# Patient Record
Sex: FEMALE | Race: WHITE | Employment: OTHER | URBAN - METROPOLITAN AREA
[De-identification: names, ages, dates, MRNs, and addresses within clinical notes are randomized per-mention and may not be internally consistent; named-entity substitution may affect disease eponyms.]

---

## 2017-01-05 ENCOUNTER — HOSPITAL ENCOUNTER (OUTPATIENT)
Dept: GENERAL RADIOLOGY | Facility: HOSPITAL | Age: 82
Discharge: HOME | End: 2017-01-05
Attending: ORTHOPAEDIC SURGERY
Payer: MEDICARE

## 2017-01-05 DIAGNOSIS — Z96.612 PRESENCE OF LEFT ARTIFICIAL SHOULDER JOINT: ICD-10-CM

## 2017-01-05 PROCEDURE — 73030 X-RAY EXAM OF SHOULDER: CPT

## 2017-02-28 ENCOUNTER — OFFICE VISIT (OUTPATIENT)
Dept: PODIATRY CLINIC | Facility: CLINIC | Age: 82
End: 2017-02-28

## 2017-02-28 DIAGNOSIS — Z79.4 TYPE 2 DIABETES MELLITUS WITH DIABETIC POLYNEUROPATHY, WITH LONG-TERM CURRENT USE OF INSULIN (HCC): Primary | ICD-10-CM

## 2017-02-28 DIAGNOSIS — L84 CALLUS: ICD-10-CM

## 2017-02-28 DIAGNOSIS — B35.1 ONYCHOMYCOSIS: ICD-10-CM

## 2017-02-28 DIAGNOSIS — E11.42 TYPE 2 DIABETES MELLITUS WITH DIABETIC POLYNEUROPATHY, WITH LONG-TERM CURRENT USE OF INSULIN (HCC): Primary | ICD-10-CM

## 2017-02-28 PROCEDURE — 11721 DEBRIDE NAIL 6 OR MORE: CPT | Performed by: PODIATRIST

## 2017-02-28 PROCEDURE — 11055 PARING/CUTG B9 HYPRKER LES 1: CPT | Performed by: PODIATRIST

## 2017-02-28 NOTE — PROGRESS NOTES
HPI:    Patient ID: Kamron More is a 80year old female. HPI  This 80-year-old diabetic presents for evaluation and care. She saw Coco Noel on February 27, 2017.   Patient reports that her most recent A1c was 8.6 and her fasting blood sugar today wa Comment:rash  Shellfish-Derived P*        Comment:Other reaction(s): (DO NOT USE, NOT SCREENED)             SHELLFISH CONT PROD   PHYSICAL EXAM:   Physical Exam  On physical exam the dorsalis pedis pulses noted but diminished in the posterior tibial pulse

## 2017-05-23 ENCOUNTER — APPOINTMENT (OUTPATIENT)
Dept: LAB | Facility: HOSPITAL | Age: 82
End: 2017-05-23
Attending: UROLOGY
Payer: MEDICARE

## 2017-05-23 DIAGNOSIS — N39.0 RECURRENT UTI: ICD-10-CM

## 2017-05-23 PROCEDURE — 81001 URINALYSIS AUTO W/SCOPE: CPT

## 2017-05-23 PROCEDURE — 87186 SC STD MICRODIL/AGAR DIL: CPT

## 2017-05-23 PROCEDURE — 87077 CULTURE AEROBIC IDENTIFY: CPT

## 2017-05-23 PROCEDURE — 87086 URINE CULTURE/COLONY COUNT: CPT

## 2017-05-26 ENCOUNTER — TELEPHONE (OUTPATIENT)
Dept: SURGERY | Facility: CLINIC | Age: 82
End: 2017-05-26

## 2017-05-26 RX ORDER — CEFADROXIL 500 MG/1
CAPSULE ORAL
Qty: 20 CAPSULE | Refills: 0 | Status: SHIPPED | OUTPATIENT
Start: 2017-05-26 | End: 2018-09-06

## 2017-05-30 ENCOUNTER — OFFICE VISIT (OUTPATIENT)
Dept: OTOLARYNGOLOGY | Facility: CLINIC | Age: 82
End: 2017-05-30

## 2017-05-30 VITALS
BODY MASS INDEX: 28.93 KG/M2 | HEIGHT: 66 IN | WEIGHT: 180 LBS | SYSTOLIC BLOOD PRESSURE: 130 MMHG | TEMPERATURE: 98 F | DIASTOLIC BLOOD PRESSURE: 80 MMHG

## 2017-05-30 DIAGNOSIS — H61.23 BILATERAL IMPACTED CERUMEN: Primary | ICD-10-CM

## 2017-05-30 PROCEDURE — G0463 HOSPITAL OUTPT CLINIC VISIT: HCPCS | Performed by: OTOLARYNGOLOGY

## 2017-05-30 PROCEDURE — 99202 OFFICE O/P NEW SF 15 MIN: CPT | Performed by: OTOLARYNGOLOGY

## 2017-05-30 RX ORDER — METFORMIN HYDROCHLORIDE 500 MG/1
500 TABLET, EXTENDED RELEASE ORAL 3 TIMES DAILY
COMMUNITY
Start: 2017-04-09

## 2017-06-28 ENCOUNTER — OFFICE VISIT (OUTPATIENT)
Dept: SURGERY | Facility: CLINIC | Age: 82
End: 2017-06-28

## 2017-06-28 VITALS
TEMPERATURE: 98 F | WEIGHT: 182 LBS | HEART RATE: 65 BPM | BODY MASS INDEX: 28.9 KG/M2 | DIASTOLIC BLOOD PRESSURE: 82 MMHG | SYSTOLIC BLOOD PRESSURE: 158 MMHG | HEIGHT: 66.5 IN

## 2017-06-28 DIAGNOSIS — N34.3 URETHRAL SYNDROME: ICD-10-CM

## 2017-06-28 DIAGNOSIS — R35.1 NOCTURIA: ICD-10-CM

## 2017-06-28 DIAGNOSIS — E11.69 TYPE 2 DIABETES MELLITUS WITH OTHER SPECIFIED COMPLICATION (HCC): ICD-10-CM

## 2017-06-28 DIAGNOSIS — N39.0 RECURRENT UTI: Primary | ICD-10-CM

## 2017-06-28 PROCEDURE — 99214 OFFICE O/P EST MOD 30 MIN: CPT | Performed by: UROLOGY

## 2017-06-28 PROCEDURE — G0463 HOSPITAL OUTPT CLINIC VISIT: HCPCS | Performed by: UROLOGY

## 2017-06-28 RX ORDER — NITROFURANTOIN MACROCRYSTALS 50 MG/1
50 CAPSULE ORAL NIGHTLY
Qty: 90 CAPSULE | Refills: 3 | Status: SHIPPED | OUTPATIENT
Start: 2017-06-28 | End: 2018-04-03

## 2017-06-28 NOTE — PROGRESS NOTES
HPI:    Patient ID: Nathan Balderas is a 80year old female. HPI     1. Recurrent UTI  Recurrent condition for 10 years. Recent infections in July 2016 and May 2017. Presently the patient is asymptomatic.  Symptoms of UTI include urinary frequency and dy CHART REVIEW AND PAST UROLOGIC HISTORY -- please see above. In addition to above. ...  Consult with me in office December 27, 2006 because of recurrent UTI - two to three per year for the preceding 20 years, most of them associated with gross hematuria; usua penicillin  4 times a day × 10 days with resolution of symptoms. 6/27/13 sudden onset of UTI symptoms; urine culture Escherichia coli + beta hemolytic strep group B; urgent care treated with antibiotic, name unknown.  Symptoms improved initially, but • UTI (urinary tract infection)       Past Surgical History:  11/02/16: [facility] REPLACEMENT Left      Comment: DR Ortiz Said  No date: APPENDECTOMY  No date: COLONOSCOPY  No date: D & C  3/14: EGD   Family History   Problem Relation Age of Onset   • Alia Bactrim [Sulfametho*    Rash    Comment:rash  Shellfish-Derived P*        Comment:Other reaction(s): (DO NOT USE, NOT SCREENED)             SHELLFISH CONT PROD   PHYSICAL EXAM:   Physical Exam   Constitutional: She appears well-developed and well-nourished 12/21/2015 urine culture = 1-50,000 cfu/mL multiple species present - probable contamination ( no hemolytic streptococcus 10,000 - 50,000 cfu/mL and gram negative bacilli < 10,000 cfu/mL )   12/4/2015 urine culture = klebsiella pneumoniae 50,000 - 100,000 Treatment Plan & Patient Instructions    1. Please continue nitrofurantoin macrocrystals 50 mg capsule daily to help prevent recurrent urinary tract infection     2.   If you develop symptoms of urinary tract infection, immediately submit urine specimen, s

## 2017-06-28 NOTE — PATIENT INSTRUCTIONS
1.  Please continue nitrofurantoin macrocrystals 50 mg capsule daily to help prevent recurrent urinary tract infection     2.   If you develop symptoms of urinary tract infection, immediately submit urine specimen, standing order for urine culture and compl urethra is longer in men, so a UTI is less likely to reach the bladder or kidneys in men. Date Last Reviewed: 9/8/2014  © 8276-8975 [employer] [address] [place] [address], [address] [place] [address]. All rights reserved.  This information is not intended

## 2017-07-17 ENCOUNTER — APPOINTMENT (OUTPATIENT)
Dept: LAB | Facility: HOSPITAL | Age: 82
End: 2017-07-17
Attending: UROLOGY
Payer: MEDICARE

## 2017-07-17 DIAGNOSIS — N39.0 RECURRENT UTI: ICD-10-CM

## 2017-07-17 LAB
BILIRUB UR QL: NEGATIVE
COLOR UR: YELLOW
GLUCOSE UR-MCNC: >=500 MG/DL
HGB UR QL STRIP.AUTO: NEGATIVE
KETONES UR-MCNC: NEGATIVE MG/DL
NITRITE UR QL STRIP.AUTO: NEGATIVE
PH UR: 5 [PH] (ref 5–8)
PROT UR-MCNC: 30 MG/DL
RBC #/AREA URNS AUTO: 3 /HPF
SP GR UR STRIP: 1.02 (ref 1–1.03)
UROBILINOGEN UR STRIP-ACNC: <2
VIT C UR-MCNC: 40 MG/DL
WBC #/AREA URNS AUTO: 238 /HPF

## 2017-07-17 PROCEDURE — 81001 URINALYSIS AUTO W/SCOPE: CPT

## 2017-07-25 ENCOUNTER — TELEPHONE (OUTPATIENT)
Dept: SURGERY | Facility: CLINIC | Age: 82
End: 2017-07-25

## 2017-07-25 NOTE — TELEPHONE ENCOUNTER
Spoke with pt and informed her of PVK's results msg and instructions below and also told her to use her standing order and to instruct the lab personnel to only run a C&S since they did not complete it the first time and she should also call us for results

## 2017-07-25 NOTE — TELEPHONE ENCOUNTER
----- Message from Verna Schuler MD sent at 7/23/2017  7:52 PM CDT -----  Please call patient.   On 7/17/17 laboratory data urinalysis urine test but  unfortunately did not do urine culture; urinalysis is highly suspicious for urinary tract infection wi

## 2017-07-25 NOTE — TELEPHONE ENCOUNTER
James Butler called back and stated that she is going to investigate and aslo speak with the IT dept as the order does not look right either. She will get back to me tomorrow.

## 2017-07-26 ENCOUNTER — APPOINTMENT (OUTPATIENT)
Dept: LAB | Facility: HOSPITAL | Age: 82
End: 2017-07-26
Attending: UROLOGY
Payer: MEDICARE

## 2017-07-26 DIAGNOSIS — N39.0 RECURRENT UTI: ICD-10-CM

## 2017-07-26 PROCEDURE — 87077 CULTURE AEROBIC IDENTIFY: CPT

## 2017-07-26 PROCEDURE — 87086 URINE CULTURE/COLONY COUNT: CPT

## 2017-07-26 PROCEDURE — 87186 SC STD MICRODIL/AGAR DIL: CPT

## 2017-07-31 ENCOUNTER — TELEPHONE (OUTPATIENT)
Dept: SURGERY | Facility: CLINIC | Age: 82
End: 2017-07-31

## 2017-07-31 RX ORDER — CEFADROXIL 500 MG/1
CAPSULE ORAL
Qty: 20 CAPSULE | Refills: 0 | Status: SHIPPED | OUTPATIENT
Start: 2017-07-31 | End: 2017-11-07

## 2017-11-07 ENCOUNTER — OFFICE VISIT (OUTPATIENT)
Dept: OTOLARYNGOLOGY | Facility: CLINIC | Age: 82
End: 2017-11-07

## 2017-11-07 VITALS
BODY MASS INDEX: 28.09 KG/M2 | DIASTOLIC BLOOD PRESSURE: 70 MMHG | WEIGHT: 179 LBS | TEMPERATURE: 98 F | HEIGHT: 67 IN | SYSTOLIC BLOOD PRESSURE: 112 MMHG

## 2017-11-07 DIAGNOSIS — H61.23 BILATERAL IMPACTED CERUMEN: Primary | ICD-10-CM

## 2017-11-07 PROCEDURE — 69210 REMOVE IMPACTED EAR WAX UNI: CPT | Performed by: OTOLARYNGOLOGY

## 2017-11-07 NOTE — PROGRESS NOTES
Tanner Class is a 80year old female.   Patient presents with:  Ear Wax: bilateral ear cleaning       HISTORY OF PRESENT ILLNESS    Patient presents for cerumen removal. No other complaints or concerns at this time    Social History    Marital status: Mar Sitting, Cuff Size: adult)   Temp 98 °F (36.7 °C) (Tympanic)   Ht 5' 7\" (1.702 m)   Wt 179 lb (81.2 kg)   BMI 28.04 kg/m²        Constitutional Normal Overall appearance - Normal.        Neck Exam Normal Inspection - Normal. Palpation - Normal. Parotid gl MG Oral Cap, Take  by mouth., Disp: , Rfl:   •  Atorvastatin Calcium (LIPITOR) 40 MG Oral Tab, , Disp: , Rfl:   •  lisinopril (PRINIVIL,ZESTRIL) 40 MG Oral Tab, , Disp: , Rfl:   •  Metoprolol Succinate ER (TOPROL XL) 100 MG Oral Tablet 24 Hr, Take  by mout

## 2018-03-27 ENCOUNTER — APPOINTMENT (OUTPATIENT)
Dept: LAB | Facility: HOSPITAL | Age: 83
End: 2018-03-27
Attending: UROLOGY
Payer: MEDICARE

## 2018-03-27 DIAGNOSIS — N39.0 RECURRENT UTI: ICD-10-CM

## 2018-03-27 LAB
BILIRUB UR QL: NEGATIVE
COLOR UR: YELLOW
GLUCOSE UR-MCNC: >=500 MG/DL
HGB UR QL STRIP.AUTO: NEGATIVE
KETONES UR-MCNC: NEGATIVE MG/DL
NITRITE UR QL STRIP.AUTO: NEGATIVE
PH UR: 5 [PH] (ref 5–8)
PROT UR-MCNC: NEGATIVE MG/DL
RBC #/AREA URNS AUTO: 1 /HPF
SP GR UR STRIP: 1.02 (ref 1–1.03)
UROBILINOGEN UR STRIP-ACNC: <2
VIT C UR-MCNC: 40 MG/DL
WBC #/AREA URNS AUTO: 2 /HPF

## 2018-03-27 PROCEDURE — 81001 URINALYSIS AUTO W/SCOPE: CPT

## 2018-03-27 PROCEDURE — 87086 URINE CULTURE/COLONY COUNT: CPT

## 2018-04-03 ENCOUNTER — OFFICE VISIT (OUTPATIENT)
Dept: SURGERY | Facility: CLINIC | Age: 83
End: 2018-04-03

## 2018-04-03 VITALS
SYSTOLIC BLOOD PRESSURE: 138 MMHG | WEIGHT: 179 LBS | DIASTOLIC BLOOD PRESSURE: 70 MMHG | HEIGHT: 66 IN | BODY MASS INDEX: 28.77 KG/M2

## 2018-04-03 DIAGNOSIS — N39.41 URGE INCONTINENCE: ICD-10-CM

## 2018-04-03 DIAGNOSIS — N39.3 STRESS INCONTINENCE: ICD-10-CM

## 2018-04-03 DIAGNOSIS — N39.0 RECURRENT UTI: Primary | ICD-10-CM

## 2018-04-03 DIAGNOSIS — N34.3 URETHRAL SYNDROME: ICD-10-CM

## 2018-04-03 DIAGNOSIS — R35.1 NOCTURIA: ICD-10-CM

## 2018-04-03 PROCEDURE — G0463 HOSPITAL OUTPT CLINIC VISIT: HCPCS | Performed by: UROLOGY

## 2018-04-03 PROCEDURE — 99214 OFFICE O/P EST MOD 30 MIN: CPT | Performed by: UROLOGY

## 2018-04-03 RX ORDER — NITROFURANTOIN MACROCRYSTALS 50 MG/1
50 CAPSULE ORAL NIGHTLY
Qty: 90 CAPSULE | Refills: 3 | Status: SHIPPED | OUTPATIENT
Start: 2018-04-03 | End: 2019-05-02

## 2018-04-03 NOTE — PROGRESS NOTES
HPI:    Patient ID: Selena Garcia is a 80year old female. HPI    1. Recurrent UTI  Recurrent condition for 10 years. Recent infections in July 2016, May 2017, and 07/26/2017 . Presently the patient is asymptomatic.  Symptoms of UTI include sensation o pelvic mass.” January 29, 2007, office cystoscopy with dilation of urethra and removal - fulguration of bladder lesion. Labia major and labia minora were erythematous consistent with nonspecific dermatitis. Urethral stenosis - dilated.  Small lesion near le urine culture beta hemolytic strep group B; took penicillin  mg 4 times a day ×10 days with resolution UTI symptoms. Office visit 9/24/14 - Started Hiprex 1 gram BID.  Patient politely refused vaginal cream or cystoscopy.    9/24/15 E. coli UTI sensit Diabetes Mother    • Cancer Brother      liver   • Cancer Other      family h/o of prostate      Smoking status: Former Smoker                                                              Packs/day: 0.00      Years: 0.00      Smokeless tobacco: Never Used atraumatic. Eyes: EOM are normal.   Neck: Normal range of motion. Pulmonary/Chest: Effort normal.   Abdominal:   Flanks non-tender to percussion or palpation      Neurological: She is alert. Skin: Skin is dry.    Psychiatric: Her behavior is normal. J 12/4/2015 urine culture = klebsiella pneumoniae 50,000 - 100,000 cfu/mL   9/24/2015 urine culture = E. coli > 100,000 cfu/mL     IMAGING   8/2/2016 renal ultrasound = kidneys normal, small left parapelvic cyst, no hydronephrosis           ASSESSMENT/PLAN below.    (N39.3) Stress incontinence  Chronic problem. Pt wears one pad daily to stay dry.  Patient feels this problem is stable; I discussed, explained, and answered questions on treatment options and patient understood and chooses to continue observation and in the presence of Lucia Galarza MD.   Electronically Signed: Devon Stevens, 4/3/2018, 2:45 PM.    Sheila Gastelum MD,  personally performed the services described in this documentation.  All medical record entries made by the scribe were at my

## 2018-04-03 NOTE — PATIENT INSTRUCTIONS
1.  Continue nitrofurantoin macro crystal 50 mg capsule daily to prevent recurrent urinary tract infections.     2.  Please continue to have kidney function blood test and liver function blood test with Dr. Charla Fisher twice yearly in light of you being o

## 2018-09-14 ENCOUNTER — ANESTHESIA EVENT (OUTPATIENT)
Dept: ENDOSCOPY | Facility: HOSPITAL | Age: 83
End: 2018-09-14
Payer: MEDICARE

## 2018-09-14 ENCOUNTER — HOSPITAL ENCOUNTER (OUTPATIENT)
Facility: HOSPITAL | Age: 83
Setting detail: HOSPITAL OUTPATIENT SURGERY
Discharge: HOME | End: 2018-09-14
Attending: INTERNAL MEDICINE | Admitting: INTERNAL MEDICINE
Payer: MEDICARE

## 2018-09-14 ENCOUNTER — ANESTHESIA (OUTPATIENT)
Dept: ENDOSCOPY | Facility: HOSPITAL | Age: 83
End: 2018-09-14
Payer: MEDICARE

## 2018-09-14 VITALS
HEART RATE: 66 BPM | WEIGHT: 174 LBS | BODY MASS INDEX: 27.97 KG/M2 | RESPIRATION RATE: 16 BRPM | OXYGEN SATURATION: 95 % | HEIGHT: 66 IN | SYSTOLIC BLOOD PRESSURE: 149 MMHG | DIASTOLIC BLOOD PRESSURE: 75 MMHG

## 2018-09-14 DIAGNOSIS — R13.10 DYSPHAGIA, UNSPECIFIED TYPE: ICD-10-CM

## 2018-09-14 LAB — GLUCOSE BLDC GLUCOMTR-MCNC: 152 MG/DL (ref 70–99)

## 2018-09-14 PROCEDURE — 88312 SPECIAL STAINS GROUP 1: CPT | Performed by: INTERNAL MEDICINE

## 2018-09-14 PROCEDURE — 0DB48ZX EXCISION OF ESOPHAGOGASTRIC JUNCTION, VIA NATURAL OR ARTIFICIAL OPENING ENDOSCOPIC, DIAGNOSTIC: ICD-10-PCS | Performed by: INTERNAL MEDICINE

## 2018-09-14 PROCEDURE — 82962 GLUCOSE BLOOD TEST: CPT

## 2018-09-14 PROCEDURE — 88305 TISSUE EXAM BY PATHOLOGIST: CPT | Performed by: INTERNAL MEDICINE

## 2018-09-14 RX ORDER — NALOXONE HYDROCHLORIDE 0.4 MG/ML
80 INJECTION, SOLUTION INTRAMUSCULAR; INTRAVENOUS; SUBCUTANEOUS AS NEEDED
Status: CANCELLED | OUTPATIENT
Start: 2018-09-14 | End: 2018-09-14

## 2018-09-14 RX ORDER — LIDOCAINE HYDROCHLORIDE 10 MG/ML
INJECTION, SOLUTION EPIDURAL; INFILTRATION; INTRACAUDAL; PERINEURAL AS NEEDED
Status: DISCONTINUED | OUTPATIENT
Start: 2018-09-14 | End: 2018-09-14 | Stop reason: SURG

## 2018-09-14 RX ORDER — SODIUM CHLORIDE, SODIUM LACTATE, POTASSIUM CHLORIDE, CALCIUM CHLORIDE 600; 310; 30; 20 MG/100ML; MG/100ML; MG/100ML; MG/100ML
INJECTION, SOLUTION INTRAVENOUS CONTINUOUS
Status: CANCELLED | OUTPATIENT
Start: 2018-09-14

## 2018-09-14 RX ORDER — DEXTROSE MONOHYDRATE 25 G/50ML
50 INJECTION, SOLUTION INTRAVENOUS
Status: CANCELLED | OUTPATIENT
Start: 2018-09-14

## 2018-09-14 RX ORDER — FLUCONAZOLE 100 MG/1
100 TABLET ORAL DAILY
Qty: 10 TABLET | Refills: 0 | Status: SHIPPED | OUTPATIENT
Start: 2018-09-14 | End: 2018-09-24

## 2018-09-14 RX ORDER — SODIUM CHLORIDE, SODIUM LACTATE, POTASSIUM CHLORIDE, CALCIUM CHLORIDE 600; 310; 30; 20 MG/100ML; MG/100ML; MG/100ML; MG/100ML
INJECTION, SOLUTION INTRAVENOUS CONTINUOUS
Status: DISCONTINUED | OUTPATIENT
Start: 2018-09-14 | End: 2018-09-14

## 2018-09-14 RX ADMIN — SODIUM CHLORIDE, SODIUM LACTATE, POTASSIUM CHLORIDE, CALCIUM CHLORIDE: 600; 310; 30; 20 INJECTION, SOLUTION INTRAVENOUS at 13:40:00

## 2018-09-14 RX ADMIN — SODIUM CHLORIDE, SODIUM LACTATE, POTASSIUM CHLORIDE, CALCIUM CHLORIDE: 600; 310; 30; 20 INJECTION, SOLUTION INTRAVENOUS at 13:27:00

## 2018-09-14 RX ADMIN — LIDOCAINE HYDROCHLORIDE 40 MG: 10 INJECTION, SOLUTION EPIDURAL; INFILTRATION; INTRACAUDAL; PERINEURAL at 13:33:00

## 2018-09-14 NOTE — OPERATIVE REPORT
ENDOSCOPY OPERATIVE REPORT    Patient Name: Azul Souza  Medical Record #: [id number]  YOB: 1933  Date of Procedure: 9/14/2018    Preoperative Diagnosis: progressive solid food dysphagia; history of Schatzki's ring.    Postoperative Diagno and throughtout the vital signs were stable. COMPLICATIONS: None. PLAN: Continue PPI. Diflucan for Candida esophagitis.   The patient and  were informed of the endoscopic findings and was also given a copy of the findings, postoperative instructions

## 2018-09-14 NOTE — ANESTHESIA POSTPROCEDURE EVALUATION
Patient: Kassy Davis    Procedure Summary     Date:  09/14/18 Room / Location:  [facility] ENDOSCOPY 01 / [facility] ENDOSCOPY    Anesthesia Start:  0230 Anesthesia Stop:  7111    Procedure:  ESOPHAGOGASTRODUODENOSCOPY (EGD) (N/A ) Diagnosis:       Dysphagia, unspeci

## 2018-09-14 NOTE — H&P
History & Physical Examination    Patient Name: Janie Zamudio  MRN: [id number]  CSN: [id number]  YOB: 1933    Diagnosis: dysphagia. History of Schatzki's ring. Present Illness: dysphagia. History of Schatzki's ring.        Medications Pr pressure    • High cholesterol    • Hyperlipidemia    • Osteoarthritis    • Schatzki's ring 3/14   • Unspecified essential hypertension    • UTI (urinary tract infection)    • Visual impairment      Past Surgical History:  11/02/16: Idalia Lantigua

## 2018-09-14 NOTE — ANESTHESIA PREPROCEDURE EVALUATION
Anesthesia PreOp Note    HPI:     Doreen Recinos is a 80year old female who presents for preoperative consultation requested by: Cher Fowler MD    Date of Surgery: 9/14/2018    Procedure(s):  ESOPHAGOGASTRODUODENOSCOPY (EGD)  Indication: Dysphagia, unspe aspirin 81 MG Oral Chew Tab Chew 81 mg by mouth.  Disp:  Rfl:  9/12/2018 at 0800   Glucose Blood (FREESTYLE LITE TEST) In Vitro Strip  Disp:  Rfl:  Taking   Insulin Lispro, Human, (HUMALOG) 100 UNIT/ML Subcutaneous Solution 3 times a day with meals-using date: 46        Years since quittin.7      Smokeless tobacco: Never Used    Substance and Sexual Activity      Alcohol use: No        Alcohol/week: 0.0 oz      Drug use: No      Sexual activity: Not on file    Other Topics      Concerns:        Not ability. The patient desires the anesthetic management as planned.   Tiffany Bullard  9/14/2018 12:59 PM

## 2018-09-18 NOTE — PROGRESS NOTES
Here are the biopsy/pathology findings from your recent EGD (Upper  Endoscopy): esophagus biopsies confirm acid reflux related irritation but otherwise negative. If you need any further assistance, please feel free to call [phone].     Thank you for

## 2018-09-20 ENCOUNTER — OFFICE VISIT (OUTPATIENT)
Dept: OTOLARYNGOLOGY | Facility: CLINIC | Age: 83
End: 2018-09-20
Payer: MEDICARE

## 2018-09-20 VITALS
DIASTOLIC BLOOD PRESSURE: 70 MMHG | WEIGHT: 179 LBS | TEMPERATURE: 97 F | SYSTOLIC BLOOD PRESSURE: 124 MMHG | HEIGHT: 66 IN | BODY MASS INDEX: 28.77 KG/M2

## 2018-09-20 DIAGNOSIS — R07.0 THROAT PAIN IN ADULT: Primary | ICD-10-CM

## 2018-09-20 PROCEDURE — 99214 OFFICE O/P EST MOD 30 MIN: CPT | Performed by: OTOLARYNGOLOGY

## 2018-09-20 PROCEDURE — 31575 DIAGNOSTIC LARYNGOSCOPY: CPT | Performed by: OTOLARYNGOLOGY

## 2018-09-20 NOTE — PROGRESS NOTES
Dylan Stanton is a 80year old female. Patient presents with:  Throat Problem: per pt she feels something stucked at her throat for a month      HISTORY OF PRESENT ILLNESS    I have seen her in the past for wax cleanings.   About a month ago she developed date: Osteoarthritis  3/14: Schatzki's ring  No date: Unspecified essential hypertension  No date: UTI (urinary tract infection)  No date: Visual impairment    Past Surgical History:  11/02/16: [place] REPLACEMENT;  Left      Comment:  DR Yomaira Padilla  No Normal. Oropharynx - Normal.   Ears Normal Inspection - Right: Normal, Left: Normal. Canal - Right: Normal, Left: Normal. TM - Right: Normal, Left: Normal.   Skin Normal Inspection - Normal.        Lymph Detail Normal Submental. Submandibular.  Anterior cer Syringes, Disposable, U-100 0.5 ML Does not apply Misc, , Disp: , Rfl:   •  FreeStyle Lancets Does not apply Misc, , Disp: , Rfl:   •  aspirin 81 MG Oral Chew Tab, Chew 81 mg by mouth., Disp: , Rfl:   •  Glucose Blood (FREESTYLE LITE TEST) In Vitro Strip,

## 2018-09-21 ENCOUNTER — TELEPHONE (OUTPATIENT)
Dept: OTOLARYNGOLOGY | Facility: CLINIC | Age: 83
End: 2018-09-21

## 2018-09-21 RX ORDER — LORATADINE 10 MG/1
10 TABLET ORAL DAILY
Qty: 30 TABLET | Refills: 3 | Status: ON HOLD | OUTPATIENT
Start: 2018-09-21 | End: 2020-04-24

## 2018-09-21 RX ORDER — FLUTICASONE PROPIONATE 50 MCG
1 SPRAY, SUSPENSION (ML) NASAL 2 TIMES DAILY
Qty: 1 BOTTLE | Refills: 3 | Status: SHIPPED | OUTPATIENT
Start: 2018-09-21

## 2018-09-21 RX ORDER — MONTELUKAST SODIUM 10 MG/1
10 TABLET ORAL NIGHTLY
Qty: 30 TABLET | Refills: 3 | Status: ON HOLD | OUTPATIENT
Start: 2018-09-21 | End: 2020-04-24

## 2018-09-21 NOTE — TELEPHONE ENCOUNTER
Pt called stating pt was seen 9-20-18. Pt went to the pharm. 9-21-18 and did not have any rx. Please send.   Call pt to advise

## 2018-09-21 NOTE — TELEPHONE ENCOUNTER
Patients daughter states she received a call. States to reach patient should call [phone]. Please call thank you.

## 2019-05-02 RX ORDER — NITROFURANTOIN MACROCRYSTALS 50 MG/1
CAPSULE ORAL
Qty: 90 CAPSULE | Refills: 0 | Status: SHIPPED | OUTPATIENT
Start: 2019-05-02 | End: 2019-05-21

## 2019-05-02 NOTE — TELEPHONE ENCOUNTER
Pt LOV with Dr. Maple Opitz 4/30/18 pt pharmacy requesting refill on nitrofurantoin, pt has upcoming autumn with PVK 5/21/19 if you agree please review and sign med. I copied and pasted part of PVK note below. 1.  Continue nitrofurantoin macro crystal 50 mg capsule daily to prevent recurrent urinary tract infections.

## 2019-05-21 ENCOUNTER — OFFICE VISIT (OUTPATIENT)
Dept: SURGERY | Facility: CLINIC | Age: 84
End: 2019-05-21
Payer: MEDICARE

## 2019-05-21 VITALS
HEART RATE: 64 BPM | SYSTOLIC BLOOD PRESSURE: 127 MMHG | DIASTOLIC BLOOD PRESSURE: 75 MMHG | BODY MASS INDEX: 27 KG/M2 | WEIGHT: 167 LBS

## 2019-05-21 DIAGNOSIS — Z91.89 AT RISK FOR SIDE EFFECT OF MEDICATION: ICD-10-CM

## 2019-05-21 DIAGNOSIS — N34.3 URETHRAL SYNDROME: ICD-10-CM

## 2019-05-21 DIAGNOSIS — Z79.82 LONG TERM CURRENT USE OF ASPIRIN: ICD-10-CM

## 2019-05-21 DIAGNOSIS — R35.1 NOCTURIA: ICD-10-CM

## 2019-05-21 DIAGNOSIS — N39.0 RECURRENT UTI: Primary | ICD-10-CM

## 2019-05-21 DIAGNOSIS — N39.3 STRESS INCONTINENCE: ICD-10-CM

## 2019-05-21 DIAGNOSIS — N39.41 URGE INCONTINENCE: ICD-10-CM

## 2019-05-21 PROCEDURE — 99214 OFFICE O/P EST MOD 30 MIN: CPT | Performed by: UROLOGY

## 2019-05-21 PROCEDURE — G0463 HOSPITAL OUTPT CLINIC VISIT: HCPCS | Performed by: UROLOGY

## 2019-05-21 RX ORDER — NITROFURANTOIN MACROCRYSTALS 50 MG/1
CAPSULE ORAL
Qty: 90 CAPSULE | Refills: 3 | Status: SHIPPED | OUTPATIENT
Start: 2019-05-21 | End: 2020-08-05

## 2019-05-21 NOTE — PROGRESS NOTES
HPI:    Patient ID: Jj Jay is a 80year old female. HPI  Recurrent UTI  Recurrent condition since 2006. Recent infections in July 2016, May 2017, and 07/26/2017. Presently the patient is asymptomatic.  Symptoms of UTI include sensation of not em transvaginal pelvic ultrasound “tiny cystic areas within the endometrium of questionable significance. No evidence of pelvic mass.” January 29, 2007, office cystoscopy with dilation of urethra and removal - fulguration of bladder lesion.  Labia major and la hematuria with RBC 5; CT urogram 10/8/13 no obvious urinary tract disease. 10/7/13 acute sudden onset of UTI symptoms; urine culture beta hemolytic strep group B; took penicillin  mg 4 times a day ×10 days with resolution UTI symptoms.  Office visit 9 Disp: 14 tablet Rfl: 0   Montelukast Sodium 10 MG Oral Tab Take 1 tablet (10 mg total) by mouth nightly. Disp: 30 tablet Rfl: 3   loratadine 10 MG Oral Tab Take 1 tablet (10 mg total) by mouth daily.  Disp: 30 tablet Rfl: 3   Fluticasone Propionate 50 MCG/A EGD  3/14   • ESOPHAGOGASTRODUODENOSCOPY (EGD) N/A 9/14/2018    Performed by Cehr Fowler MD at [address] [place] [address] ENDOSCOPY   • SHOULDER TOTAL Left 11/2/2016    Performed by Fanta Malloy MD at [address] [place] [address] MAIN OR      Family History   Problem Relation Age of Onset   • Diabetes >60  9/27/2016 UA RBC = 1, WBC = 2, glucose > 500  8/31/2016 glucose 193, high  7/25/2012 urine cytology = no malignant cells identified  8/4/2016 urine culture = 10-50,000 cfu/mL multiple species present - probable contamination   7/15/2016 urine culture understands all of this and decides to continue Nitrofurantoin Macrocrystals 50 MG daily.  I advised patient to get a blood draw for renal and hepatic panel to monitor for the 1% incidence of kidney failure or liver dysfunction from the antibiotic twice a y nitrofurantoin macro crystal 50 mg capsule daily to prevent recurrent urinary tract infections.     2.  Please continue to have kidney function blood test and liver function blood test with Dr. García Overall twice yearly in light of you being on long-term

## 2019-05-21 NOTE — PATIENT INSTRUCTIONS
Diego Allen M.D.      1.  Continue nitrofurantoin macro crystal 50 mg capsule daily to prevent recurrent urinary tract infections.     2.  Please continue to have kidney function blood test and liver function blood test wi

## 2020-04-23 ENCOUNTER — APPOINTMENT (OUTPATIENT)
Dept: GENERAL RADIOLOGY | Facility: HOSPITAL | Age: 85
DRG: 194 | End: 2020-04-23
Attending: EMERGENCY MEDICINE
Payer: MEDICARE

## 2020-04-23 ENCOUNTER — HOSPITAL ENCOUNTER (INPATIENT)
Facility: HOSPITAL | Age: 85
LOS: 2 days | Discharge: HOME HEALTH | DRG: 194 | End: 2020-04-26
Attending: EMERGENCY MEDICINE | Admitting: HOSPITALIST
Payer: MEDICARE

## 2020-04-23 DIAGNOSIS — R09.02 HYPOXIA: ICD-10-CM

## 2020-04-23 DIAGNOSIS — J18.9 COMMUNITY ACQUIRED PNEUMONIA OF LEFT LOWER LOBE OF LUNG: Primary | ICD-10-CM

## 2020-04-23 PROCEDURE — 71045 X-RAY EXAM CHEST 1 VIEW: CPT | Performed by: EMERGENCY MEDICINE

## 2020-04-24 PROBLEM — R09.02 HYPOXIA: Status: ACTIVE | Noted: 2020-04-24

## 2020-04-24 PROCEDURE — 99223 1ST HOSP IP/OBS HIGH 75: CPT | Performed by: HOSPITALIST

## 2020-04-24 RX ORDER — DEXTROSE MONOHYDRATE 25 G/50ML
50 INJECTION, SOLUTION INTRAVENOUS
Status: DISCONTINUED | OUTPATIENT
Start: 2020-04-24 | End: 2020-04-26

## 2020-04-24 RX ORDER — METOPROLOL SUCCINATE 100 MG/1
100 TABLET, EXTENDED RELEASE ORAL DAILY
Status: DISCONTINUED | OUTPATIENT
Start: 2020-04-24 | End: 2020-04-26

## 2020-04-24 RX ORDER — AZITHROMYCIN 250 MG/1
500 TABLET, FILM COATED ORAL NIGHTLY
Status: DISCONTINUED | OUTPATIENT
Start: 2020-04-24 | End: 2020-04-26

## 2020-04-24 RX ORDER — LISINOPRIL 40 MG/1
40 TABLET ORAL DAILY
Status: DISCONTINUED | OUTPATIENT
Start: 2020-04-24 | End: 2020-04-24

## 2020-04-24 RX ORDER — FLUTICASONE PROPIONATE 50 MCG
1 SPRAY, SUSPENSION (ML) NASAL 2 TIMES DAILY
Status: DISCONTINUED | OUTPATIENT
Start: 2020-04-24 | End: 2020-04-26

## 2020-04-24 RX ORDER — RUFINAMIDE 40 MG/ML
1 SUSPENSION ORAL DAILY
Status: DISCONTINUED | OUTPATIENT
Start: 2020-04-24 | End: 2020-04-26

## 2020-04-24 RX ORDER — SODIUM CHLORIDE 0.9 % (FLUSH) 0.9 %
3 SYRINGE (ML) INJECTION AS NEEDED
Status: DISCONTINUED | OUTPATIENT
Start: 2020-04-24 | End: 2020-04-26

## 2020-04-24 RX ORDER — ATORVASTATIN CALCIUM 40 MG/1
40 TABLET, FILM COATED ORAL NIGHTLY
Status: DISCONTINUED | OUTPATIENT
Start: 2020-04-24 | End: 2020-04-26

## 2020-04-24 RX ORDER — VITS A,C,E/LUTEIN/MINERALS 300MCG-200
1 TABLET ORAL DAILY
Status: DISCONTINUED | OUTPATIENT
Start: 2020-04-24 | End: 2020-04-26

## 2020-04-24 RX ORDER — HEPARIN SODIUM 5000 [USP'U]/ML
5000 INJECTION, SOLUTION INTRAVENOUS; SUBCUTANEOUS EVERY 12 HOURS SCHEDULED
Status: DISCONTINUED | OUTPATIENT
Start: 2020-04-24 | End: 2020-04-26

## 2020-04-24 RX ORDER — ASPIRIN 81 MG/1
81 TABLET, CHEWABLE ORAL DAILY
Status: DISCONTINUED | OUTPATIENT
Start: 2020-04-24 | End: 2020-04-26

## 2020-04-24 RX ORDER — NITROFURANTOIN 25; 75 MG/1; MG/1
50 CAPSULE ORAL EVERY 12 HOURS SCHEDULED
Status: DISCONTINUED | OUTPATIENT
Start: 2020-04-24 | End: 2020-04-24

## 2020-04-24 NOTE — PROGRESS NOTES
[facility]  Antimicrobial Stewardship Duplicate Coverage Recommendation Note    Karen Barr is a 80year old female    Current Antimicrobial Medications  Anti-infectives (From admission, onward)      Start     Dose/Rate Route Frequency Ordered Stop

## 2020-04-24 NOTE — PROGRESS NOTES
[facility] Pharm[facility] Note: Route Optimization for Azithromycin [facility])    Patient is currently on Azithromycin (ZITHROMAX) 500 mg IV every 24 hours.    The patient meets the criteria to convert to the oral equivalent as established by the IV to Oral conversion

## 2020-04-24 NOTE — PROGRESS NOTES
Lutein CAPS 1 tablet  is Non-Formulary Medication &  Auto-Substituted to Ocuvite tablet Per P&T PROTOCOL

## 2020-04-24 NOTE — PHYSICAL THERAPY NOTE
Chart reviewed with orders received. Patient currently on isolation for R/O COVID-19. Per COVID-19 protocol therapy on hold until test results are made available. Will continue to follow peripherally.     Thank you,  Lizzy Delcid, PT, DPT

## 2020-04-24 NOTE — ED PROVIDER NOTES
Patient Seen in: [facility] AND [facility] Emergency Department      History   Patient presents with:  Dyspnea LANEY SOB    Stated Complaint: sob    HPI    58-year-old female presents the ER with complaint of shortness of breath and cough.   Patient's oxygen satura Wt 72.6 kg   SpO2 95%   BMI 25.06 kg/m²         Physical Exam  Vitals signs and nursing note reviewed. Constitutional:       Appearance: Normal appearance. She is normal weight. HENT:      Head: Normocephalic and atraumatic.       Nose: Nose normal. W/ DIFFERENTIAL[[id number]]                              Final result                 Please view results for these tests on the individual orders.    RAINBOW DRAW BLUE   RAINBOW DRAW LAVENDER   RAINBOW DRAW LIGHT GREEN   RAINBOW DRAW GOLD   BLOOD CULTURE left lower lobe of lung  (primary encounter diagnosis)  Hypoxia    Disposition:  Admit  4/23/2020 11:32 pm    Follow-up:  No follow-up provider specified.   We recommend that you schedule follow up care with a primary care provider within the next three mon

## 2020-04-24 NOTE — PROGRESS NOTES
Multi For Her 50+ CAPS 1 tablet  is Non-Formulary Medication &  Auto-Substituted to centrum tablet Per P&T PROTOCOL

## 2020-04-24 NOTE — ED NOTES
Orders for admission, patient is aware of plan and ready to go upstairs. Any questions, please call ED RN BLAYNE NORTH  at extension [phone]      Alert and oriented x3. 2L O2.

## 2020-04-24 NOTE — HOME CARE LIAISON
Received referral from [address] [address]. Per request spoke with patient's son Norris Kidd, confirmed agreeable to [address] [place] [address] , pending orders. All questions addressed and answered.      Patient will need a face to face entered prior to discharge

## 2020-04-24 NOTE — H&P
Juan C Myles    PATIENT'S NAME: [email or web address]   ATTENDING PHYSICIAN: Alyssa Rivero MD   PATIENT ACCOUNT#:   [id number]    LOCATION:  [address] [place] [address] [address] #:   [id number]       YOB: 1933  ADMISSION DATE:       04 her blood sugars usually run between 90 and 120, but they have been higher over the past couple of days or so. She denies any history of asthma. She does report that her voice has seemed somewhat hoarse, but she has not had a sore throat.   She denies any patient states she eats shrimp without any problems. FAMILY HISTORY:  The patient's father  in his [de-identified]; he had diabetes and a stroke. Her mother  in her [de-identified]; she had valvular heart disease and diabetes.   She had a brother who  in his 62s of wheezes. HEART:  Regular rate and rhythm. Normal S1, S2, and a systolic murmur. ABDOMEN:  Soft, nontender with normoactive bowel sounds. No guarding. No rebound. EXTREMITIES:  No clubbing, cyanosis, calf tenderness, palpable cords or edema.   SKIN:  W and precautions taken while interviewing and examining this patient. 2.   Diabetes. Accu-Cheks before meals and bedtime. Cover with sliding scale insulin and her usual Lantus. 3.   Hypertension. Blood pressure is elevated. Continue her beta blocker.

## 2020-04-24 NOTE — SLP NOTE
ADULT SWALLOWING EVALUATION    ASSESSMENT    ASSESSMENT/OVERALL IMPRESSION:      PT IS R/O COVID 19 . PPE REQUIRED. THIS SLP WORE GOWN, GLOVES, FACE SHIELD, AND DROPLET MASK OVER N95 RESPIRATOR MASK. HANDS SANITIZED/WASHED UPON ENTRANCE/EXIT.     This BSE w (no straw). BSE results/recommendations discussed with Pt and family; good understanding noted. Swallowing precautions written in purple on white board in Pt room.           PLAN:    Speech to f/u x3 sessions/meals for dysphagia treatment/aspiration precaut Within Functional Limits  Tone: Within Functional Limits  Range of Motion: Within Functional Limits  Rate of Motion: Within Functional Limits    Voice Quality: Clear  Respiratory Status: Supplemental O2;Nasal cannula  Consistencies Trialed: Thin liquids; Ne

## 2020-04-24 NOTE — CM/SW NOTE
MDO to AZEEM for d/c planning. Pt admitted 4/23/20 for progressive cough and SOB. AZEEM spoke to pt's son Jared Balbuena ([phone]) for collateral information. Pt lives with her spouse in a single family home w/3 external steps to enter the home.   Pt h

## 2020-04-24 NOTE — ED NOTES
Orders for admission, patient is aware of plan and ready to go upstairs, any questions, please call ED RN Gary Roberto at ext: [phone]

## 2020-04-24 NOTE — ED INITIAL ASSESSMENT (HPI)
Patient brought in by EMS from home. Patients daughter called EMS for shortness of breath. Patient had cough that started a couple days ago.

## 2020-04-24 NOTE — PLAN OF CARE
Problem: Patient Centered Care  Goal: Patient preferences are identified and integrated in the patient's plan of care  Description  Interventions:  - What would you like us to know as we care for you? \" I live with my \".   - Provide timely, compl or ABGs  - Provide Smoking Cessation handout, if applicable  - Encourage broncho-pulmonary hygiene including cough, deep breathe, Incentive Spirometry  - Assess the need for suctioning and perform as needed  - Assess and instruct to report SOB or any respi

## 2020-04-25 ENCOUNTER — APPOINTMENT (OUTPATIENT)
Dept: GENERAL RADIOLOGY | Facility: HOSPITAL | Age: 85
DRG: 194 | End: 2020-04-25
Attending: HOSPITALIST
Payer: MEDICARE

## 2020-04-25 PROCEDURE — 74230 X-RAY XM SWLNG FUNCJ C+: CPT | Performed by: HOSPITALIST

## 2020-04-25 PROCEDURE — 99233 SBSQ HOSP IP/OBS HIGH 50: CPT | Performed by: HOSPITALIST

## 2020-04-25 NOTE — PROGRESS NOTES
[facility]    Progress Note    Merikatey Hinojosa Patient Status:  Inpatient    1933 MRN [id number]   Location [facility] 5SW/SE Attending Rhea Torrez, [address] [facility] Day # 1 PCP Samara Macario MD       Subjective:   Hung Caputo UNIT/ML flextouch 20 Units, 20 Units, Subcutaneous, Nightly  Ocuvite-Lutein (OCUVITE - EYE VITAMIN) tab 1 tablet, 1 tablet, Oral, Daily  Metoprolol Succinate ER (Toprol XL) 24 hr tab 100 mg, 100 mg, Oral, Daily  Multivitamin Chewtab (ADULT) (CENTRUM) 1 tab Value Date    WBC 7.1 04/25/2020    WBC 6.7 04/24/2020    WBC 7.8 04/23/2020     Lab Results   Component Value Date    HGB 12.4 04/25/2020    HGB 11.3 (L) 04/24/2020    HGB 12.8 04/23/2020      Lab Results   Component Value Date    .0 04/25/2020 spent with patient. > 50% time was spent counseling patient, discussing plan of care, discussing labs and imaging findings. Spoke with consultant. All questions answered.          9/98/3883     **Certification      PHYSICIAN Certification of Need for Inpati

## 2020-04-25 NOTE — PLAN OF CARE
Patient A/Ox4, VSS. Afebrile. On room air. Denies pain and nausea. Voiding freely, saline locked per protocol. On remote tele. Video swallow eval completed today; patient now on thin liquids. Accuchecks completed AC/HS.  Bed in lowest position, call bell wi Progressing     Problem: RESPIRATORY - ADULT  Goal: Achieves optimal ventilation and oxygenation  Description  INTERVENTIONS:  - Assess for changes in respiratory status  - Assess for changes in mentation and behavior  - Position to facilitate oxygenation toileting schedule  Outcome: Progressing     Problem: DISCHARGE PLANNING  Goal: Discharge to home or other facility with appropriate resources  Description  INTERVENTIONS:  - Identify barriers to discharge w/pt and caregiver  - Include patient/family/disch ordered  Outcome: Progressing     Problem: Impaired Swallowing  Goal: Minimize aspiration risk  Description  Interventions:  - Patient should be alert and upright for all feedings (90 degrees preferred)  - Offer food and liquids at a slow rate  - No straws

## 2020-04-25 NOTE — PLAN OF CARE
Problem: Patient Centered Care  Goal: Patient preferences are identified and integrated in the patient's plan of care  Description  Interventions:  - What would you like us to know as we care for you? \" I live with my \".   - Provide timely, compl saturation or ABGs  - Provide Smoking Cessation handout, if applicable  - Encourage broncho-pulmonary hygiene including cough, deep breathe, Incentive Spirometry  - Assess the need for suctioning and perform as needed  - Assess and instruct to report SOB o transportation as appropriate  - Identify discharge learning needs (meds, wound care, etc)  - Arrange for interpreters to assist at discharge as needed  - Consider post-discharge preferences of patient/family/discharge partner  - Complete POLST form as autumn notified. MD aware of patient's refusal to follow diet.

## 2020-04-25 NOTE — PHYSICAL THERAPY NOTE
PHYSICAL THERAPY EVALUATION - INPATIENT     Room Number: 536/536-A  Evaluation Date: 4/25/2020  Type of Evaluation: Initial   Physician Order: PT Eval and Treat    Presenting Problem: SOB, cough, hypoxia, PNA  Reason for Therapy: Mobility Dysfunction and Would benefit from HHPT to address. Patient will benefit from continued IP PT services to address these deficits in preparation for discharge.     DISCHARGE RECOMMENDATIONS  PT Discharge Recommendations: 24 hour care/supervision;Home with home health PT risk    WEIGHT BEARING RESTRICTION  Weight Bearing Restriction: None    PAIN ASSESSMENT  Ratin    COGNITION  · Overall Cognitive Status:  Impaired  · Following Commands:  follows one step commands without difficulty  · Safety Judgement:  decreased awar assist    Transfers: sit to stand tx at [facility]    Exercise/Education Provided:  Bed mobility  Energy conservation  Functional activity tolerated  Gait training  Posture  Transfer training    Patient End of Session: Up in chair;Needs met;Call light within reac

## 2020-04-26 VITALS
HEIGHT: 67 IN | TEMPERATURE: 98 F | SYSTOLIC BLOOD PRESSURE: 138 MMHG | OXYGEN SATURATION: 94 % | RESPIRATION RATE: 18 BRPM | DIASTOLIC BLOOD PRESSURE: 69 MMHG | WEIGHT: 161.81 LBS | HEART RATE: 85 BPM | BODY MASS INDEX: 25.4 KG/M2

## 2020-04-26 PROCEDURE — 99239 HOSP IP/OBS DSCHRG MGMT >30: CPT | Performed by: HOSPITALIST

## 2020-04-26 RX ORDER — AZITHROMYCIN 250 MG/1
TABLET, FILM COATED ORAL
Qty: 2 TABLET | Refills: 0 | Status: SHIPPED | OUTPATIENT
Start: 2020-04-26 | End: 2020-04-28

## 2020-04-26 RX ORDER — CEFDINIR 300 MG/1
300 CAPSULE ORAL 2 TIMES DAILY
Qty: 14 CAPSULE | Refills: 0 | Status: SHIPPED | OUTPATIENT
Start: 2020-04-26 | End: 2020-05-03

## 2020-04-26 RX ORDER — POTASSIUM CHLORIDE 20 MEQ/1
40 TABLET, EXTENDED RELEASE ORAL ONCE
Status: COMPLETED | OUTPATIENT
Start: 2020-04-26 | End: 2020-04-26

## 2020-04-26 NOTE — CM/SW NOTE
Per RN, pt is medically stable to discharge today and will need HHC. Per chart review, referral to [facility] previously made. [facility]. SW notified Sarah from [facility] regarding pt's discharge today.      Cayetano Thurman

## 2020-04-26 NOTE — PLAN OF CARE
Problem: Patient Centered Care  Goal: Patient preferences are identified and integrated in the patient's plan of care  Description  Interventions:  - What would you like us to know as we care for you? \" I live with my \".   - Provide timely, compl saturation or ABGs  - Provide Smoking Cessation handout, if applicable  - Encourage broncho-pulmonary hygiene including cough, deep breathe, Incentive Spirometry  - Assess the need for suctioning and perform as needed  - Assess and instruct to report SOB o transportation as appropriate  - Identify discharge learning needs (meds, wound care, etc)  - Arrange for interpreters to assist at discharge as needed  - Consider post-discharge preferences of patient/family/discharge partner  - Complete POLST form as autumn time, crush or deliver with applesauce as needed  - Discontinue feeding and notify MD (or speech pathologist) if coughing or persistent throat clearing or wet/gurgly vocal quality is noted  Outcome: Progressing

## 2020-04-26 NOTE — PLAN OF CARE
Problem: Patient Centered Care  Goal: Patient preferences are identified and integrated in the patient's plan of care  Description  Interventions:  - What would you like us to know as we care for you? \" I live with my \".   - Provide timely, compl saturation or ABGs  - Provide Smoking Cessation handout, if applicable  - Encourage broncho-pulmonary hygiene including cough, deep breathe, Incentive Spirometry  - Assess the need for suctioning and perform as needed  - Assess and instruct to report SOB o transportation as appropriate  - Identify discharge learning needs (meds, wound care, etc)  - Arrange for interpreters to assist at discharge as needed  - Consider post-discharge preferences of patient/family/discharge partner  - Complete POLST form as autumn time, crush or deliver with applesauce as needed  - Discontinue feeding and notify MD (or speech pathologist) if coughing or persistent throat clearing or wet/gurgly vocal quality is noted  Outcome: Progressing    4/26/20 dry cough, afebrile, emy. 1800ada,

## 2020-04-26 NOTE — PLAN OF CARE
Problem: Patient Centered Care  Goal: Patient preferences are identified and integrated in the patient's plan of care  Description  Interventions:  - What would you like us to know as we care for you? \" I live with my \".   - Provide timely, compl interventions    4/26/2020 1055 by Jerod Campo RN  Outcome: Completed  4/26/2020 0840 by Jerod Campo RN  Outcome: Progressing     Problem: RESPIRATORY - ADULT  Goal: Achieves optimal ventilation and oxygenation  Description  I Assess pt frequently for physical needs  - Identify cognitive and physical deficits and behaviors that affect risk of falls.   - [place] fall precautions as indicated by assessment.  - Educate pt/family on patient safety including physical limitations  - vasoactive medications to optimize hemodynamic stability  - Monitor arterial and/or venous puncture sites for bleeding and/or hematoma  - Assess quality of pulses, skin color and temperature  - Assess for signs of decreased coronary artery perfusion - ex.

## 2020-04-26 NOTE — PHYSICAL THERAPY NOTE
PHYSICAL THERAPY TREATMENT NOTE - INPATIENT     Room Number: 536/536-A       Presenting Problem: SOB, cough, hypoxia, PNA    Problem List  Principal Problem:    Community acquired pneumonia of left lower lobe of lung  Active Problems:    Hypoxia      PHYSI currently have. ..  -   Turning over in bed (including adjusting bedclothes, sheets and blankets)?: None   -   Sitting down on and standing up from a chair with arms (e.g., wheelchair, bedside commode, etc.): A Little   -   Moving from lying on back to sitt Status

## 2020-04-27 ENCOUNTER — PATIENT OUTREACH (OUTPATIENT)
Dept: CASE MANAGEMENT | Age: 85
End: 2020-04-27

## 2020-04-27 NOTE — DISCHARGE SUMMARY
[facility]    Discharge Summary    Jonatananthony Antonia Patient Status:  Inpatient    1933 MRN [id number]   Location [facility] 5SW/SE Attending No att. providers found   Hosp Day # 2 PCP Laura Bustos MD     Date of Admission: that time, she developed a nonproductive cough which has gotten progressively worse. She said that she will have bouts of coughing that are so bad that she will be gasping for air and just cannot breathe.   She and her  have been isolated at home; n more opportunity to monitor her blood pressure with her pneumonia.      4.       Hemoglobin low-normal has dropped about 1 g from previous level in November of last year.  Cont to monitor      5.       Hyperlipidemia.  Continue statin.     6.       Recurren mouth. Refills:  0     atorvastatin 40 MG Tabs  Commonly known as:  LIPITOR      Take 40 mg by mouth daily. Refills:  0     Fish Oil 1200 MG Caps      Take 1 tablet by mouth daily.    Refills:  0     FreeStyle Lancets Misc       Refills:  0     FreeStyl

## 2020-04-27 NOTE — PROGRESS NOTES
1st attempt [facility]/PNA apt request    [facility]   Cayetano Austin  [phone]    Apt made Mon.  May 4th @10am

## 2020-05-04 ENCOUNTER — VIRTUAL PHONE E/M (OUTPATIENT)
Dept: CARDIOLOGY CLINIC | Facility: HOSPITAL | Age: 85
End: 2020-05-04
Attending: NURSE PRACTITIONER
Payer: MEDICARE

## 2020-05-04 DIAGNOSIS — J18.9 COMMUNITY ACQUIRED PNEUMONIA OF LEFT LOWER LOBE OF LUNG: Primary | ICD-10-CM

## 2020-05-04 PROCEDURE — 99442 PR TELEPHONE EVAL AND MGNT EST PATIENT 11-20 MIN MEDICAL DISCUSSION: CPT | Performed by: NURSE PRACTITIONER

## 2020-05-04 NOTE — PROGRESS NOTES
Telephone visit. Patient consents to phone visit. Hospital follow up phone call in lieu of [facility] visit due to [address] pandemic. PHM:       Subjective:  Feeling tired since discharge. Continues with cough with yellow tinged sputum. Denying f/c.   Was seen

## 2020-05-04 NOTE — PROGRESS NOTES
Virtual Telephone Check-In    Ryan Bustamante verbally consents to a Virtual/Telephone Check-In visit on 05/04/20. Patient understands and accepts financial responsibility for any deductible, co-insurance and/or co-pays associated with this service.     D 64-96 oz per day     Wash your hands frequently and cover your nose/mouth with coughing or sneezing.      Call if having any dizziness, lightheadedness, heart racing, palpitations, chest pain, shortness of breath, coughing, swelling, weight gain, fever, chi

## 2020-05-20 ENCOUNTER — TELEPHONE (OUTPATIENT)
Dept: SURGERY | Facility: CLINIC | Age: 85
End: 2020-05-20

## 2020-05-20 DIAGNOSIS — N39.0 RECURRENT UTI: Primary | ICD-10-CM

## 2020-05-20 NOTE — TELEPHONE ENCOUNTER
Do to COVID-19 (Coronavirus) global pandemic, shelter in place and MD's modified schedules, I called patient to offer video visit or televisit. Patient would like a televisit; and is aware to submit a urine specimen at lab a few day before the visit.

## 2020-05-22 ENCOUNTER — APPOINTMENT (OUTPATIENT)
Dept: LAB | Facility: HOSPITAL | Age: 85
End: 2020-05-22
Attending: UROLOGY
Payer: MEDICARE

## 2020-05-22 DIAGNOSIS — N39.0 RECURRENT UTI: ICD-10-CM

## 2020-05-22 PROCEDURE — 81001 URINALYSIS AUTO W/SCOPE: CPT

## 2020-05-22 PROCEDURE — 87086 URINE CULTURE/COLONY COUNT: CPT

## 2020-05-27 ENCOUNTER — VIRTUAL PHONE E/M (OUTPATIENT)
Dept: SURGERY | Facility: CLINIC | Age: 85
End: 2020-05-27
Payer: MEDICARE

## 2020-05-27 ENCOUNTER — TELEPHONE (OUTPATIENT)
Dept: SURGERY | Facility: CLINIC | Age: 85
End: 2020-05-27

## 2020-05-27 DIAGNOSIS — Z91.89 AT RISK FOR SIDE EFFECT OF MEDICATION: ICD-10-CM

## 2020-05-27 DIAGNOSIS — R31.0 GROSS HEMATURIA: ICD-10-CM

## 2020-05-27 DIAGNOSIS — Z79.82 LONG TERM CURRENT USE OF ASPIRIN: ICD-10-CM

## 2020-05-27 DIAGNOSIS — N34.3 URETHRAL SYNDROME: ICD-10-CM

## 2020-05-27 DIAGNOSIS — R35.1 NOCTURIA: ICD-10-CM

## 2020-05-27 DIAGNOSIS — N39.41 URGE INCONTINENCE: ICD-10-CM

## 2020-05-27 DIAGNOSIS — N39.0 RECURRENT UTI: Primary | ICD-10-CM

## 2020-05-27 DIAGNOSIS — N39.3 STRESS INCONTINENCE: ICD-10-CM

## 2020-05-27 DIAGNOSIS — N28.1 RENAL CYST: ICD-10-CM

## 2020-05-27 DIAGNOSIS — Z87.891 FORMER SMOKER: ICD-10-CM

## 2020-05-27 DIAGNOSIS — R31.29 MICROHEMATURIA: ICD-10-CM

## 2020-05-27 PROCEDURE — 99443 PHONE E/M BY PHYS 21-30 MIN: CPT | Performed by: UROLOGY

## 2020-05-27 NOTE — PATIENT INSTRUCTIONS
Caden Grier M.D.    1.  Continue nitrofurantoin macrocrystals 50 mg capsule daily to prevent recurrent urinary tract infection.     2.  If you develop symptoms of urinary tract infection, immediately submit urine s

## 2020-05-27 NOTE — TELEPHONE ENCOUNTER
Urology nurses,   please call patient; please schedule her for office cystoscopy with possible biopsy under straight local anesthesia; she needs to perform CT urogram first before the cystoscopy and that order has been entered into the system.     Thank you, Dr. Zeke Richard St

## 2020-05-27 NOTE — PROGRESS NOTES
Virtual Telephone Check-In    Dylancarley Stanton verbally consents to a Virtual/Telephone Check-In visit on 05/27/20. Patient has been referred to the [facility] website at [email or web address] to review the yearly Consent to Treat document.  This is a COVID-19 medication for this. The patient feels this is stable.      Nocturia  Patient states nocturia 1x. Patient states sensation of not emptying bladder, but this is stable. She drinks 48-64 oz of water, 1.5 cups of coffee and 0 cans of soda.  Patient does not ru tumors. A 0.5 cm echogenic nodule, right kidney, may represent cortical defect or small angiomyolipoma; 0.8 cm structure may represent parapelvic cyst. Left kidney - two cystic structures may represent small parapelvic cysts.  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 06/28/2017 office visit with me; continue nitrofurantoin 50 mg capsule daily to help prevent recurr klebsiella ppneumonia # 2 > 100,000 cfu/mL - both resistant to ampicillin, strain 1 was intermediate to piperacillin/tazopactam and strain 2 was sensitive to piperacillin/tazopactam ----- treated with Bactrim DS x 10 days.    6/209/2016 urine culture = 50-1 decides to follow up in with UA and urine culture before the visit.     (R31.29) Microhematuria  Patient has experienced occasional gross hematuria over the past year. The severity is pink and she denies associated pain and history of kidney stones.  Most r surgical procedures and will need to hold medication prior to any surgical procedure.      (Z91.89) At risk for side effect of medication  Patient is currently taking Nitrofurantoin Macrocrystals 50 MG daily.  She feels this is stable and chooses to continu presence of Gary Arrieta MD.   Electronically Signed: Kaylin Wise, 5/27/2020, 11:12 AM.     I, Gary Arrieta MD,  personally performed the services described in this documentation.  All medical record entries made by the scribe were at my direction

## 2020-05-29 ENCOUNTER — APPOINTMENT (OUTPATIENT)
Dept: GENERAL RADIOLOGY | Facility: HOSPITAL | Age: 85
End: 2020-05-29
Attending: INTERNAL MEDICINE
Payer: MEDICARE

## 2020-05-29 ENCOUNTER — HOSPITAL ENCOUNTER (OUTPATIENT)
Dept: GENERAL RADIOLOGY | Facility: HOSPITAL | Age: 85
Discharge: HOME | End: 2020-05-29
Attending: INTERNAL MEDICINE
Payer: MEDICARE

## 2020-05-29 DIAGNOSIS — J18.9 PNEUMONIA: ICD-10-CM

## 2020-05-29 PROCEDURE — 71046 X-RAY EXAM CHEST 2 VIEWS: CPT | Performed by: INTERNAL MEDICINE

## 2020-06-18 ENCOUNTER — HOSPITAL ENCOUNTER (OUTPATIENT)
Dept: CT IMAGING | Facility: HOSPITAL | Age: 85
Discharge: HOME | End: 2020-06-18
Attending: UROLOGY
Payer: MEDICARE

## 2020-06-18 DIAGNOSIS — R31.29 MICROHEMATURIA: ICD-10-CM

## 2020-06-18 PROCEDURE — 76376 3D RENDER W/INTRP POSTPROCES: CPT | Performed by: UROLOGY

## 2020-06-18 PROCEDURE — 74178 CT ABD&PLV WO CNTR FLWD CNTR: CPT | Performed by: UROLOGY

## 2020-06-18 PROCEDURE — 82565 ASSAY OF CREATININE: CPT

## 2020-08-05 RX ORDER — NITROFURANTOIN MACROCRYSTALS 50 MG/1
CAPSULE ORAL
Qty: 90 CAPSULE | Refills: 0 | Status: SHIPPED | OUTPATIENT
Start: 2020-08-05 | End: 2020-11-02

## 2020-11-02 NOTE — TELEPHONE ENCOUNTER
Pt LOV with Dr. Garcia July 5/27/2020 pt pharmacy requesting refill o nitrofurantoin if you agree please review and sign med, I copied and pasted part of PVK last note below.     .  Continue nitrofurantoin macrocrystals 50 mg capsule daily to prevent recurrent urin

## 2020-11-03 RX ORDER — NITROFURANTOIN MACROCRYSTALS 50 MG/1
CAPSULE ORAL
Qty: 90 CAPSULE | Refills: 3 | Status: SHIPPED | OUTPATIENT
Start: 2020-11-03 | End: 2021-11-01

## 2021-02-10 ENCOUNTER — HOSPITAL ENCOUNTER (OUTPATIENT)
Dept: GENERAL RADIOLOGY | Facility: HOSPITAL | Age: 86
Discharge: HOME | End: 2021-02-10
Attending: INTERNAL MEDICINE
Payer: MEDICARE

## 2021-02-10 DIAGNOSIS — M54.32 BILATERAL SCIATICA: ICD-10-CM

## 2021-02-10 DIAGNOSIS — M54.31 BILATERAL SCIATICA: ICD-10-CM

## 2021-02-10 PROCEDURE — 72100 X-RAY EXAM L-S SPINE 2/3 VWS: CPT | Performed by: INTERNAL MEDICINE

## 2021-03-03 DIAGNOSIS — Z23 NEED FOR VACCINATION: ICD-10-CM

## 2021-03-08 ENCOUNTER — HOSPITAL ENCOUNTER (OUTPATIENT)
Dept: ULTRASOUND IMAGING | Facility: HOSPITAL | Age: 86
Discharge: HOME | End: 2021-03-08
Attending: INTERNAL MEDICINE
Payer: MEDICARE

## 2021-03-08 DIAGNOSIS — I73.9 PERIPHERAL VASCULAR DISEASE (HCC): ICD-10-CM

## 2021-03-08 PROCEDURE — 93925 LOWER EXTREMITY STUDY: CPT | Performed by: INTERNAL MEDICINE

## 2021-03-09 ENCOUNTER — IMMUNIZATION (OUTPATIENT)
Dept: LAB | Facility: HOSPITAL | Age: 86
End: 2021-03-09
Attending: HOSPITALIST
Payer: MEDICARE

## 2021-03-09 DIAGNOSIS — Z23 NEED FOR VACCINATION: Primary | ICD-10-CM

## 2021-03-09 PROCEDURE — 0011A SARSCOV2 VAC 100MCG/0.5ML IM: CPT

## 2021-04-06 ENCOUNTER — IMMUNIZATION (OUTPATIENT)
Dept: LAB | Facility: HOSPITAL | Age: 86
End: 2021-04-06
Attending: EMERGENCY MEDICINE
Payer: MEDICARE

## 2021-04-06 DIAGNOSIS — Z23 NEED FOR VACCINATION: Primary | ICD-10-CM

## 2021-04-06 PROCEDURE — 0012A SARSCOV2 VAC 100MCG/0.5ML IM: CPT

## 2021-06-22 ENCOUNTER — OFFICE VISIT (OUTPATIENT)
Dept: OTOLARYNGOLOGY | Facility: CLINIC | Age: 86
End: 2021-06-22
Payer: MEDICARE

## 2021-06-22 VITALS
BODY MASS INDEX: 26.68 KG/M2 | SYSTOLIC BLOOD PRESSURE: 128 MMHG | TEMPERATURE: 98 F | DIASTOLIC BLOOD PRESSURE: 68 MMHG | HEIGHT: 67 IN | WEIGHT: 170 LBS

## 2021-06-22 DIAGNOSIS — H91.93 BILATERAL HEARING LOSS, UNSPECIFIED HEARING LOSS TYPE: Primary | ICD-10-CM

## 2021-06-22 DIAGNOSIS — H61.23 BILATERAL IMPACTED CERUMEN: ICD-10-CM

## 2021-06-22 PROCEDURE — 69210 REMOVE IMPACTED EAR WAX UNI: CPT | Performed by: OTOLARYNGOLOGY

## 2021-06-22 NOTE — PROGRESS NOTES
Thomas Esquivel is a 80year old female. Patient presents with:  Hearing Loss: pt presents today hearing loss on both ears.       HISTORY OF PRESENT ILLNESS    Patient presents for cerumen removal. No other complaints or concerns at this time    Social Hist pigment change and rash. Hema/Lymph Negative Easy bleeding and easy bruising.            PHYSICAL EXAM    /68 (BP Location: Right arm, Patient Position: Sitting, Cuff Size: adult)   Temp 98.2 °F (36.8 °C) (Tympanic)   Ht 5' 7\" (1.702 m)   Wt 170 lb 81 mg by mouth., Disp: , Rfl:   •  Glucose Blood (FREESTYLE LITE TEST) In Vitro Strip, , Disp: , Rfl:   •  Insulin Lispro, Human, (HUMALOG) 100 UNIT/ML Subcutaneous Solution, 3 times a day with meals-using up to 6 units three times a day, Disp: , Rfl:   •

## 2021-11-01 RX ORDER — NITROFURANTOIN MACROCRYSTALS 50 MG/1
CAPSULE ORAL
Qty: 90 CAPSULE | Refills: 0 | Status: SHIPPED | OUTPATIENT
Start: 2021-11-01 | End: 2022-02-07

## 2021-11-01 NOTE — TELEPHONE ENCOUNTER
Spoke with pt via phone. Verified name and . Pt due for a visit. Apt scheduled with [facility] for 11/10/21. LAR BUN and creatinine normal. Pt due for a repeat BMP.  RX refilled pending apt for next week    Urology Suppressive Antibiotics    Protocol criteria:

## 2021-11-10 ENCOUNTER — OFFICE VISIT (OUTPATIENT)
Dept: SURGERY | Facility: CLINIC | Age: 86
End: 2021-11-10
Payer: MEDICARE

## 2021-11-10 DIAGNOSIS — N39.3 STRESS INCONTINENCE: ICD-10-CM

## 2021-11-10 DIAGNOSIS — R35.1 NOCTURIA: ICD-10-CM

## 2021-11-10 DIAGNOSIS — R31.29 MICROHEMATURIA: ICD-10-CM

## 2021-11-10 DIAGNOSIS — N39.0 RECURRENT UTI: Primary | ICD-10-CM

## 2021-11-10 DIAGNOSIS — N34.3 URETHRAL SYNDROME: ICD-10-CM

## 2021-11-10 PROCEDURE — 99214 OFFICE O/P EST MOD 30 MIN: CPT | Performed by: NURSE PRACTITIONER

## 2021-11-10 NOTE — PROGRESS NOTES
Subjective:     Patient ID: Amber Dates is a 80year old female. HPI     Patient is a 80year old female who presents to the clinic for a follow up. Previously seen by Dr. Fernando Rebollar 5/27/20. Recurrent UTI  Recurrent. Problem started 2006.  Histo MACROCRYSTAL 50 MG Oral Cap TAKE 1 CAPSULE BY MOUTH NIGHTLY 90 capsule 0   • Fluticasone Propionate 50 MCG/ACT Nasal Suspension 1 spray by Nasal route 2 (two) times daily. (Patient taking differently: 1 spray by Nasal route 2 (two) times daily as needed. • D & C     • DILATION/CURETTAGE,DIAGNOSTIC     • EGD  3/14      Family History   Problem Relation Age of Onset   • Diabetes Father    • Stroke Father    • Diabetes Mother    • Cancer Brother         liver   • Cancer Other         family h/o of prostate bladder malignancies. Patient verbalizes understanding and politely declines cystoscopy. She is agreeable to UA and urine cytology, however will submit at a later time as she does not feel she can provide a sample today.       #Stress incontinence  Ron

## 2021-11-10 NOTE — PATIENT INSTRUCTIONS
Please submit to the lab urine for urinalysis with reflex culture, and urine cytoloy. This is to investigate the blood that your previously had in your urine. Today you decide against a cystoscopy.   If you were ever to see visible blood in your urine

## 2021-11-13 ENCOUNTER — LAB ENCOUNTER (OUTPATIENT)
Dept: LAB | Facility: HOSPITAL | Age: 86
End: 2021-11-13
Attending: NURSE PRACTITIONER
Payer: MEDICARE

## 2021-11-13 DIAGNOSIS — R31.29 MICROHEMATURIA: ICD-10-CM

## 2021-11-13 PROCEDURE — 81001 URINALYSIS AUTO W/SCOPE: CPT

## 2021-11-13 PROCEDURE — 87186 SC STD MICRODIL/AGAR DIL: CPT

## 2021-11-13 PROCEDURE — 87086 URINE CULTURE/COLONY COUNT: CPT

## 2021-11-13 PROCEDURE — 87077 CULTURE AEROBIC IDENTIFY: CPT

## 2021-11-13 PROCEDURE — 88108 CYTOPATH CONCENTRATE TECH: CPT

## 2021-11-15 RX ORDER — CEFADROXIL 500 MG/1
500 CAPSULE ORAL 2 TIMES DAILY
Qty: 10 CAPSULE | Refills: 0 | Status: SHIPPED | OUTPATIENT
Start: 2021-11-15 | End: 2021-11-20

## 2021-11-24 ENCOUNTER — TELEPHONE (OUTPATIENT)
Dept: SURGERY | Facility: CLINIC | Age: 86
End: 2021-11-24

## 2021-11-24 ENCOUNTER — LAB ENCOUNTER (OUTPATIENT)
Dept: LAB | Facility: HOSPITAL | Age: 86
End: 2021-11-24
Attending: NURSE PRACTITIONER
Payer: MEDICARE

## 2021-11-24 DIAGNOSIS — N39.0 RECURRENT UTI: Primary | ICD-10-CM

## 2021-11-24 DIAGNOSIS — N39.0 RECURRENT UTI: ICD-10-CM

## 2021-11-24 PROCEDURE — 87186 SC STD MICRODIL/AGAR DIL: CPT

## 2021-11-24 PROCEDURE — 87086 URINE CULTURE/COLONY COUNT: CPT

## 2021-11-24 PROCEDURE — 87077 CULTURE AEROBIC IDENTIFY: CPT

## 2021-11-24 PROCEDURE — 81001 URINALYSIS AUTO W/SCOPE: CPT

## 2021-11-24 RX ORDER — CEFADROXIL 500 MG/1
500 CAPSULE ORAL 2 TIMES DAILY
Qty: 14 CAPSULE | Refills: 0 | Status: SHIPPED | OUTPATIENT
Start: 2021-11-24 | End: 2021-12-01

## 2021-11-24 NOTE — TELEPHONE ENCOUNTER
Spoke with pt and notified her to begin taking cefadroxil after submitting urine sample. Verbalized understanding and instructed her to call on Friday to check on final culture results to make sure antibiotic is appropriate. Verbalized understanding.

## 2021-11-24 NOTE — TELEPHONE ENCOUNTER
Pt calling state medication is complete and stil having same symptoms of a UTI asking for medication please advise

## 2021-11-24 NOTE — TELEPHONE ENCOUNTER
Called pt back. States she is having urinary urgency and frequency that has started today. Denies blood in urine, foul smell, fevers or cloudiness in urine. Pt currently on Macrobid 50 mg daily. Asked pt to submit urine specimen for UA/UC.      Routed to RA

## 2021-11-24 NOTE — TELEPHONE ENCOUNTER
Agree with submitting UA and urine culture. Will start patient on Duricef 500 mg PO BID x 7 days which she can start after submitting sample.   May need to change her nitrofurantoin if those results continue to show resistance as her last urine culture did

## 2021-12-05 NOTE — TELEPHONE ENCOUNTER
----- Message from Emeka De La Rosa MD sent at 5/26/2017  9:31 AM CDT -----  Nurses, please call patient --   E. coli UTI sensitive to cefadroxil; I am electronically sending 10 day course, twice daily; while on this antibiotic,  hold the low-dose nitrofu
Patient contacted. Patient is aware of her test results, medication order by Dr. Marcie Car, and verbalized understanding. All questions answered.
Allergy;

## 2022-02-07 RX ORDER — NITROFURANTOIN MACROCRYSTALS 50 MG/1
CAPSULE ORAL
Qty: 90 CAPSULE | Refills: 0 | Status: SHIPPED | OUTPATIENT
Start: 2022-02-07

## 2022-02-07 NOTE — TELEPHONE ENCOUNTER
This RN forwarded to APRN to review and approve  the refill request of Nitrofurantoin. She was last seen on 11/10/21.

## 2022-04-10 ENCOUNTER — APPOINTMENT (OUTPATIENT)
Dept: GENERAL RADIOLOGY | Facility: HOSPITAL | Age: 87
End: 2022-04-10
Attending: EMERGENCY MEDICINE
Payer: MEDICARE

## 2022-04-10 ENCOUNTER — HOSPITAL ENCOUNTER (EMERGENCY)
Facility: HOSPITAL | Age: 87
Discharge: HOME | End: 2022-04-10
Attending: EMERGENCY MEDICINE
Payer: MEDICARE

## 2022-04-10 VITALS
BODY MASS INDEX: 28.13 KG/M2 | HEIGHT: 66 IN | HEART RATE: 72 BPM | RESPIRATION RATE: 16 BRPM | SYSTOLIC BLOOD PRESSURE: 129 MMHG | WEIGHT: 175 LBS | TEMPERATURE: 97 F | DIASTOLIC BLOOD PRESSURE: 55 MMHG | OXYGEN SATURATION: 98 %

## 2022-04-10 DIAGNOSIS — R53.1 WEAKNESS GENERALIZED: ICD-10-CM

## 2022-04-10 DIAGNOSIS — N30.00 ACUTE CYSTITIS WITHOUT HEMATURIA: Primary | ICD-10-CM

## 2022-04-10 LAB
ANION GAP SERPL CALC-SCNC: 6 MMOL/L (ref 0–18)
BASOPHILS # BLD AUTO: 0.03 X10(3) UL (ref 0–0.2)
BASOPHILS NFR BLD AUTO: 0.6 %
BILIRUB UR QL: NEGATIVE
BUN BLD-MCNC: 19 MG/DL (ref 7–18)
BUN/CREAT SERPL: 18.6 (ref 10–20)
CALCIUM BLD-MCNC: 9.3 MG/DL (ref 8.5–10.1)
CHLORIDE SERPL-SCNC: 109 MMOL/L (ref 98–112)
CO2 SERPL-SCNC: 26 MMOL/L (ref 21–32)
COLOR UR: YELLOW
CREAT BLD-MCNC: 1.02 MG/DL
DEPRECATED RDW RBC AUTO: 43.1 FL (ref 35.1–46.3)
EOSINOPHIL # BLD AUTO: 0.17 X10(3) UL (ref 0–0.7)
EOSINOPHIL NFR BLD AUTO: 3.4 %
ERYTHROCYTE [DISTWIDTH] IN BLOOD BY AUTOMATED COUNT: 12.7 % (ref 11–15)
GLUCOSE BLD-MCNC: 249 MG/DL (ref 70–99)
GLUCOSE BLDC GLUCOMTR-MCNC: 255 MG/DL (ref 70–99)
GLUCOSE UR-MCNC: 150 MG/DL
HCT VFR BLD AUTO: 45.7 %
HGB BLD-MCNC: 14.4 G/DL
HGB UR QL STRIP.AUTO: NEGATIVE
HYALINE CASTS #/AREA URNS AUTO: PRESENT /LPF
IMM GRANULOCYTES # BLD AUTO: 0.01 X10(3) UL (ref 0–1)
IMM GRANULOCYTES NFR BLD: 0.2 %
KETONES UR-MCNC: NEGATIVE MG/DL
LYMPHOCYTES # BLD AUTO: 1.35 X10(3) UL (ref 1–4)
LYMPHOCYTES NFR BLD AUTO: 27.1 %
MAGNESIUM SERPL-MCNC: 1.8 MG/DL (ref 1.6–2.6)
MCH RBC QN AUTO: 29 PG (ref 26–34)
MCHC RBC AUTO-ENTMCNC: 31.5 G/DL (ref 31–37)
MCV RBC AUTO: 92.1 FL
MONOCYTES # BLD AUTO: 0.41 X10(3) UL (ref 0.1–1)
MONOCYTES NFR BLD AUTO: 8.2 %
NEUTROPHILS # BLD AUTO: 3.01 X10 (3) UL (ref 1.5–7.7)
NEUTROPHILS # BLD AUTO: 3.01 X10(3) UL (ref 1.5–7.7)
NEUTROPHILS NFR BLD AUTO: 60.5 %
NITRITE UR QL STRIP.AUTO: NEGATIVE
OSMOLALITY SERPL CALC.SUM OF ELEC: 303 MOSM/KG (ref 275–295)
PH UR: 5 [PH] (ref 5–8)
PLATELET # BLD AUTO: 175 10(3)UL (ref 150–450)
POTASSIUM SERPL-SCNC: 3.9 MMOL/L (ref 3.5–5.1)
PROT UR-MCNC: 100 MG/DL
RBC # BLD AUTO: 4.96 X10(6)UL
RBC #/AREA URNS AUTO: >10 /HPF
SODIUM SERPL-SCNC: 141 MMOL/L (ref 136–145)
SP GR UR STRIP: 1.02 (ref 1–1.03)
TROPONIN I HIGH SENSITIVITY: 7 NG/L
UROBILINOGEN UR STRIP-ACNC: 2
VIT C UR-MCNC: NEGATIVE MG/DL
WBC # BLD AUTO: 5 X10(3) UL (ref 4–11)
WBC #/AREA URNS AUTO: >50 /HPF
WBC CLUMPS UR QL AUTO: PRESENT /HPF

## 2022-04-10 PROCEDURE — 87088 URINE BACTERIA CULTURE: CPT | Performed by: EMERGENCY MEDICINE

## 2022-04-10 PROCEDURE — 82962 GLUCOSE BLOOD TEST: CPT

## 2022-04-10 PROCEDURE — 87186 SC STD MICRODIL/AGAR DIL: CPT | Performed by: EMERGENCY MEDICINE

## 2022-04-10 PROCEDURE — 84484 ASSAY OF TROPONIN QUANT: CPT | Performed by: EMERGENCY MEDICINE

## 2022-04-10 PROCEDURE — 93010 ELECTROCARDIOGRAM REPORT: CPT | Performed by: EMERGENCY MEDICINE

## 2022-04-10 PROCEDURE — 85025 COMPLETE CBC W/AUTO DIFF WBC: CPT | Performed by: EMERGENCY MEDICINE

## 2022-04-10 PROCEDURE — 87086 URINE CULTURE/COLONY COUNT: CPT | Performed by: EMERGENCY MEDICINE

## 2022-04-10 PROCEDURE — 71045 X-RAY EXAM CHEST 1 VIEW: CPT | Performed by: EMERGENCY MEDICINE

## 2022-04-10 PROCEDURE — 81001 URINALYSIS AUTO W/SCOPE: CPT | Performed by: EMERGENCY MEDICINE

## 2022-04-10 PROCEDURE — 99284 EMERGENCY DEPT VISIT MOD MDM: CPT

## 2022-04-10 PROCEDURE — 96365 THER/PROPH/DIAG IV INF INIT: CPT

## 2022-04-10 PROCEDURE — 80048 BASIC METABOLIC PNL TOTAL CA: CPT | Performed by: EMERGENCY MEDICINE

## 2022-04-10 PROCEDURE — 96361 HYDRATE IV INFUSION ADD-ON: CPT

## 2022-04-10 PROCEDURE — 83735 ASSAY OF MAGNESIUM: CPT | Performed by: EMERGENCY MEDICINE

## 2022-04-10 PROCEDURE — 93005 ELECTROCARDIOGRAM TRACING: CPT

## 2022-04-10 RX ORDER — INSULIN ASPART 100 [IU]/ML
0.1 INJECTION, SOLUTION INTRAVENOUS; SUBCUTANEOUS ONCE
Status: COMPLETED | OUTPATIENT
Start: 2022-04-10 | End: 2022-04-10

## 2022-04-10 RX ORDER — CEPHALEXIN 500 MG/1
500 CAPSULE ORAL 2 TIMES DAILY
Qty: 14 CAPSULE | Refills: 0 | Status: SHIPPED | OUTPATIENT
Start: 2022-04-10 | End: 2022-04-17

## 2022-04-10 NOTE — ED INITIAL ASSESSMENT (HPI)
Patient presents from home via ems after daughter found patient at home lethargic. Per ems accu check 270. Hx diabetes. Upon arrival patient a/ox 4 and states she is weak.

## 2022-05-11 RX ORDER — NITROFURANTOIN MACROCRYSTALS 50 MG/1
CAPSULE ORAL
Qty: 90 CAPSULE | Refills: 0 | Status: SHIPPED | OUTPATIENT
Start: 2022-05-11

## 2022-05-11 NOTE — TELEPHONE ENCOUNTER
- Received refill request  - LOV 11/10/21  - Directions per LOV with RA  \"Continue nitrofurantoin 50 mg by mouth daily\"  - Cannot refill per protocol due to decreased GFR. - Abnormal labs from 4/10/2022   GFR: 49   BUN: 19  - Order pended and routed to [facility]. Please advise, thank  you.   Kidney:    Lab Results   Component Value Date    BUN 19 (H) 04/10/2022    BUN 14 04/26/2020    BUN 12 04/25/2020     Lab Results   Component Value Date    CREATSERUM 1.02 04/10/2022    CREATSERUM 0.68 04/26/2020    CREATSERUM 0.80 04/25/2020

## 2022-08-08 RX ORDER — NITROFURANTOIN MACROCRYSTALS 50 MG/1
50 CAPSULE ORAL NIGHTLY
Qty: 90 CAPSULE | Refills: 2 | Status: SHIPPED | OUTPATIENT
Start: 2022-08-08 | End: 2023-05-05

## 2022-11-07 NOTE — TELEPHONE ENCOUNTER
Pt calling back for RN normal sinus rhythm, Normal axis, Normal MT interval and QRS complex. There are no acute ischemic ST or T-wave changes.

## 2022-12-08 ENCOUNTER — OFFICE VISIT (OUTPATIENT)
Dept: SURGERY | Facility: CLINIC | Age: 87
End: 2022-12-08
Payer: MEDICARE

## 2022-12-08 DIAGNOSIS — N39.3 STRESS INCONTINENCE: ICD-10-CM

## 2022-12-08 DIAGNOSIS — N34.3 URETHRAL SYNDROME: ICD-10-CM

## 2022-12-08 DIAGNOSIS — N39.0 RECURRENT UTI: Primary | ICD-10-CM

## 2022-12-08 DIAGNOSIS — R35.1 NOCTURIA: ICD-10-CM

## 2022-12-08 PROCEDURE — 99213 OFFICE O/P EST LOW 20 MIN: CPT | Performed by: NURSE PRACTITIONER

## 2023-01-03 ENCOUNTER — HOSPITAL ENCOUNTER (EMERGENCY)
Facility: HOSPITAL | Age: 88
Discharge: HOME | End: 2023-01-03
Attending: EMERGENCY MEDICINE
Payer: MEDICARE

## 2023-01-03 VITALS
OXYGEN SATURATION: 94 % | HEIGHT: 66 IN | DIASTOLIC BLOOD PRESSURE: 70 MMHG | TEMPERATURE: 98 F | SYSTOLIC BLOOD PRESSURE: 128 MMHG | WEIGHT: 172 LBS | BODY MASS INDEX: 27.64 KG/M2 | HEART RATE: 67 BPM | RESPIRATION RATE: 17 BRPM

## 2023-01-03 DIAGNOSIS — N30.00 ACUTE CYSTITIS WITHOUT HEMATURIA: Primary | ICD-10-CM

## 2023-01-03 DIAGNOSIS — R10.9 FLANK PAIN: ICD-10-CM

## 2023-01-03 LAB
ALBUMIN SERPL-MCNC: 3.1 G/DL (ref 3.4–5)
ALP LIVER SERPL-CCNC: 48 U/L
ALT SERPL-CCNC: 24 U/L
ANION GAP SERPL CALC-SCNC: 4 MMOL/L (ref 0–18)
AST SERPL-CCNC: 14 U/L (ref 15–37)
BASOPHILS # BLD AUTO: 0.03 X10(3) UL (ref 0–0.2)
BASOPHILS NFR BLD AUTO: 0.5 %
BILIRUB DIRECT SERPL-MCNC: 0.2 MG/DL (ref 0–0.2)
BILIRUB SERPL-MCNC: 0.7 MG/DL (ref 0.1–2)
BILIRUB UR QL: NEGATIVE
BUN BLD-MCNC: 20 MG/DL (ref 7–18)
BUN/CREAT SERPL: 25 (ref 10–20)
CALCIUM BLD-MCNC: 8.7 MG/DL (ref 8.5–10.1)
CHLORIDE SERPL-SCNC: 107 MMOL/L (ref 98–112)
CO2 SERPL-SCNC: 28 MMOL/L (ref 21–32)
COLOR UR: YELLOW
CREAT BLD-MCNC: 0.8 MG/DL
DEPRECATED RDW RBC AUTO: 42.8 FL (ref 35.1–46.3)
EOSINOPHIL # BLD AUTO: 0.21 X10(3) UL (ref 0–0.7)
EOSINOPHIL NFR BLD AUTO: 3.3 %
ERYTHROCYTE [DISTWIDTH] IN BLOOD BY AUTOMATED COUNT: 12.8 % (ref 11–15)
GFR SERPLBLD BASED ON 1.73 SQ M-ARVRAT: 70 ML/MIN/1.73M2 (ref 60–?)
GLUCOSE BLD-MCNC: 183 MG/DL (ref 70–99)
GLUCOSE UR-MCNC: >=500 MG/DL
HCT VFR BLD AUTO: 41.2 %
HGB BLD-MCNC: 13.2 G/DL
IMM GRANULOCYTES # BLD AUTO: 0.02 X10(3) UL (ref 0–1)
IMM GRANULOCYTES NFR BLD: 0.3 %
KETONES UR-MCNC: NEGATIVE MG/DL
LIPASE SERPL-CCNC: 158 U/L (ref 73–393)
LYMPHOCYTES # BLD AUTO: 1.65 X10(3) UL (ref 1–4)
LYMPHOCYTES NFR BLD AUTO: 26.2 %
MCH RBC QN AUTO: 29.3 PG (ref 26–34)
MCHC RBC AUTO-ENTMCNC: 32 G/DL (ref 31–37)
MCV RBC AUTO: 91.4 FL
MONOCYTES # BLD AUTO: 0.72 X10(3) UL (ref 0.1–1)
MONOCYTES NFR BLD AUTO: 11.4 %
NEUTROPHILS # BLD AUTO: 3.67 X10 (3) UL (ref 1.5–7.7)
NEUTROPHILS # BLD AUTO: 3.67 X10(3) UL (ref 1.5–7.7)
NEUTROPHILS NFR BLD AUTO: 58.3 %
NITRITE UR QL STRIP.AUTO: NEGATIVE
OSMOLALITY SERPL CALC.SUM OF ELEC: 295 MOSM/KG (ref 275–295)
PH UR: 6 [PH] (ref 5–8)
PLATELET # BLD AUTO: 164 10(3)UL (ref 150–450)
POTASSIUM SERPL-SCNC: 4 MMOL/L (ref 3.5–5.1)
PROT SERPL-MCNC: 6.6 G/DL (ref 6.4–8.2)
PROT UR-MCNC: NEGATIVE MG/DL
RBC # BLD AUTO: 4.51 X10(6)UL
RBC #/AREA URNS AUTO: >10 /HPF
SODIUM SERPL-SCNC: 139 MMOL/L (ref 136–145)
SP GR UR STRIP: 1.02 (ref 1–1.03)
UROBILINOGEN UR STRIP-ACNC: <2
VIT C UR-MCNC: 20 MG/DL
WBC # BLD AUTO: 6.3 X10(3) UL (ref 4–11)

## 2023-01-03 PROCEDURE — 87088 URINE BACTERIA CULTURE: CPT | Performed by: EMERGENCY MEDICINE

## 2023-01-03 PROCEDURE — 80076 HEPATIC FUNCTION PANEL: CPT | Performed by: EMERGENCY MEDICINE

## 2023-01-03 PROCEDURE — 87086 URINE CULTURE/COLONY COUNT: CPT | Performed by: EMERGENCY MEDICINE

## 2023-01-03 PROCEDURE — 85025 COMPLETE CBC W/AUTO DIFF WBC: CPT | Performed by: EMERGENCY MEDICINE

## 2023-01-03 PROCEDURE — 99284 EMERGENCY DEPT VISIT MOD MDM: CPT

## 2023-01-03 PROCEDURE — 87186 SC STD MICRODIL/AGAR DIL: CPT | Performed by: EMERGENCY MEDICINE

## 2023-01-03 PROCEDURE — 80048 BASIC METABOLIC PNL TOTAL CA: CPT | Performed by: EMERGENCY MEDICINE

## 2023-01-03 PROCEDURE — 36415 COLL VENOUS BLD VENIPUNCTURE: CPT

## 2023-01-03 PROCEDURE — 81001 URINALYSIS AUTO W/SCOPE: CPT | Performed by: EMERGENCY MEDICINE

## 2023-01-03 PROCEDURE — 83690 ASSAY OF LIPASE: CPT | Performed by: EMERGENCY MEDICINE

## 2023-01-03 RX ORDER — CEPHALEXIN 500 MG/1
500 CAPSULE ORAL 2 TIMES DAILY
Qty: 10 CAPSULE | Refills: 0 | Status: SHIPPED | OUTPATIENT
Start: 2023-01-03 | End: 2023-01-03

## 2023-01-03 RX ORDER — CEPHALEXIN 500 MG/1
500 CAPSULE ORAL 2 TIMES DAILY
Qty: 14 CAPSULE | Refills: 0 | Status: SHIPPED | OUTPATIENT
Start: 2023-01-03 | End: 2023-01-10

## 2023-01-03 RX ORDER — CEPHALEXIN 500 MG/1
500 CAPSULE ORAL 2 TIMES DAILY
Qty: 10 CAPSULE | Refills: 0 | Status: SHIPPED | OUTPATIENT
Start: 2023-01-03 | End: 2023-01-05

## 2023-01-03 NOTE — ED INITIAL ASSESSMENT (HPI)
Patient arrives via EMS complaining of left side pain that started PTA. Vomiting x1. Zofran given by EMS en route.

## 2023-01-04 ENCOUNTER — HOSPITAL ENCOUNTER (OUTPATIENT)
Facility: HOSPITAL | Age: 88
Setting detail: OBSERVATION
Discharge: HOME | End: 2023-01-05
Attending: EMERGENCY MEDICINE | Admitting: HOSPITALIST
Payer: MEDICARE

## 2023-01-04 DIAGNOSIS — R53.1 WEAKNESS GENERALIZED: Primary | ICD-10-CM

## 2023-01-04 DIAGNOSIS — N30.00 ACUTE CYSTITIS WITHOUT HEMATURIA: ICD-10-CM

## 2023-01-04 DIAGNOSIS — R73.9 HYPERGLYCEMIA: ICD-10-CM

## 2023-01-04 LAB
ALBUMIN SERPL-MCNC: 3 G/DL (ref 3.4–5)
ALBUMIN/GLOB SERPL: 0.8 {RATIO} (ref 1–2)
ALP LIVER SERPL-CCNC: 53 U/L
ALT SERPL-CCNC: 26 U/L
ANION GAP SERPL CALC-SCNC: 8 MMOL/L (ref 0–18)
AST SERPL-CCNC: 18 U/L (ref 15–37)
ATRIAL RATE: 69 BPM
BASOPHILS # BLD AUTO: 0.02 X10(3) UL (ref 0–0.2)
BASOPHILS NFR BLD AUTO: 0.2 %
BILIRUB SERPL-MCNC: 1.1 MG/DL (ref 0.1–2)
BUN BLD-MCNC: 17 MG/DL (ref 7–18)
BUN/CREAT SERPL: 19.5 (ref 10–20)
CALCIUM BLD-MCNC: 8.9 MG/DL (ref 8.5–10.1)
CHLORIDE SERPL-SCNC: 104 MMOL/L (ref 98–112)
CO2 SERPL-SCNC: 26 MMOL/L (ref 21–32)
CREAT BLD-MCNC: 0.87 MG/DL
DEPRECATED RDW RBC AUTO: 42.5 FL (ref 35.1–46.3)
EOSINOPHIL # BLD AUTO: 0.07 X10(3) UL (ref 0–0.7)
EOSINOPHIL NFR BLD AUTO: 0.9 %
ERYTHROCYTE [DISTWIDTH] IN BLOOD BY AUTOMATED COUNT: 12.8 % (ref 11–15)
EST. AVERAGE GLUCOSE BLD GHB EST-MCNC: 171 MG/DL (ref 68–126)
GFR SERPLBLD BASED ON 1.73 SQ M-ARVRAT: 64 ML/MIN/1.73M2 (ref 60–?)
GLOBULIN PLAS-MCNC: 3.8 G/DL (ref 2.8–4.4)
GLUCOSE BLD-MCNC: 226 MG/DL (ref 70–99)
GLUCOSE BLDC GLUCOMTR-MCNC: 199 MG/DL (ref 70–99)
GLUCOSE BLDC GLUCOMTR-MCNC: 212 MG/DL (ref 70–99)
GLUCOSE BLDC GLUCOMTR-MCNC: 243 MG/DL (ref 70–99)
GLUCOSE BLDC GLUCOMTR-MCNC: 256 MG/DL (ref 70–99)
GLUCOSE BLDC GLUCOMTR-MCNC: 277 MG/DL (ref 70–99)
HBA1C MFR BLD: 7.6 % (ref ?–5.7)
HCT VFR BLD AUTO: 41.1 %
HGB BLD-MCNC: 13.2 G/DL
IMM GRANULOCYTES # BLD AUTO: 0.02 X10(3) UL (ref 0–1)
IMM GRANULOCYTES NFR BLD: 0.2 %
LACTATE SERPL-SCNC: 1.5 MMOL/L (ref 0.4–2)
LYMPHOCYTES # BLD AUTO: 0.86 X10(3) UL (ref 1–4)
LYMPHOCYTES NFR BLD AUTO: 10.6 %
MCH RBC QN AUTO: 29 PG (ref 26–34)
MCHC RBC AUTO-ENTMCNC: 32.1 G/DL (ref 31–37)
MCV RBC AUTO: 90.3 FL
MONOCYTES # BLD AUTO: 0.98 X10(3) UL (ref 0.1–1)
MONOCYTES NFR BLD AUTO: 12.1 %
NEUTROPHILS # BLD AUTO: 6.16 X10 (3) UL (ref 1.5–7.7)
NEUTROPHILS # BLD AUTO: 6.16 X10(3) UL (ref 1.5–7.7)
NEUTROPHILS NFR BLD AUTO: 76 %
OSMOLALITY SERPL CALC.SUM OF ELEC: 295 MOSM/KG (ref 275–295)
P AXIS: 10 DEGREES
P-R INTERVAL: 150 MS
PLATELET # BLD AUTO: 164 10(3)UL (ref 150–450)
POTASSIUM SERPL-SCNC: 4.1 MMOL/L (ref 3.5–5.1)
PROT SERPL-MCNC: 6.8 G/DL (ref 6.4–8.2)
Q-T INTERVAL: 430 MS
QRS DURATION: 130 MS
QTC CALCULATION (BEZET): 460 MS
R AXIS: -69 DEGREES
RBC # BLD AUTO: 4.55 X10(6)UL
SARS-COV-2 RNA RESP QL NAA+PROBE: NOT DETECTED
SODIUM SERPL-SCNC: 138 MMOL/L (ref 136–145)
T AXIS: 14 DEGREES
VENTRICULAR RATE: 69 BPM
WBC # BLD AUTO: 8.1 X10(3) UL (ref 4–11)

## 2023-01-04 PROCEDURE — 99222 1ST HOSP IP/OBS MODERATE 55: CPT | Performed by: HOSPITALIST

## 2023-01-04 PROCEDURE — 82962 GLUCOSE BLOOD TEST: CPT

## 2023-01-04 RX ORDER — HEPARIN SODIUM 5000 [USP'U]/ML
5000 INJECTION, SOLUTION INTRAVENOUS; SUBCUTANEOUS EVERY 12 HOURS
Status: DISCONTINUED | OUTPATIENT
Start: 2023-01-04 | End: 2023-01-05

## 2023-01-04 RX ORDER — NICOTINE POLACRILEX 4 MG
15 LOZENGE BUCCAL
Status: DISCONTINUED | OUTPATIENT
Start: 2023-01-04 | End: 2023-01-05

## 2023-01-04 RX ORDER — NICOTINE POLACRILEX 4 MG
30 LOZENGE BUCCAL
Status: DISCONTINUED | OUTPATIENT
Start: 2023-01-04 | End: 2023-01-05

## 2023-01-04 RX ORDER — ONDANSETRON 2 MG/ML
4 INJECTION INTRAMUSCULAR; INTRAVENOUS EVERY 6 HOURS PRN
Status: DISCONTINUED | OUTPATIENT
Start: 2023-01-04 | End: 2023-01-05

## 2023-01-04 RX ORDER — SODIUM CHLORIDE 9 MG/ML
INJECTION, SOLUTION INTRAVENOUS CONTINUOUS
Status: ACTIVE | OUTPATIENT
Start: 2023-01-04 | End: 2023-01-04

## 2023-01-04 RX ORDER — METOPROLOL SUCCINATE 100 MG/1
100 TABLET, EXTENDED RELEASE ORAL DAILY
Status: DISCONTINUED | OUTPATIENT
Start: 2023-01-04 | End: 2023-01-05

## 2023-01-04 RX ORDER — ATORVASTATIN CALCIUM 40 MG/1
40 TABLET, FILM COATED ORAL DAILY
Status: DISCONTINUED | OUTPATIENT
Start: 2023-01-04 | End: 2023-01-05

## 2023-01-04 RX ORDER — LISINOPRIL 40 MG/1
40 TABLET ORAL DAILY
Status: DISCONTINUED | OUTPATIENT
Start: 2023-01-04 | End: 2023-01-05

## 2023-01-04 RX ORDER — ASPIRIN 81 MG/1
81 TABLET, CHEWABLE ORAL DAILY
Status: DISCONTINUED | OUTPATIENT
Start: 2023-01-04 | End: 2023-01-05

## 2023-01-04 RX ORDER — SODIUM CHLORIDE AND POTASSIUM CHLORIDE 150; 900 MG/100ML; MG/100ML
INJECTION, SOLUTION INTRAVENOUS CONTINUOUS
Status: DISCONTINUED | OUTPATIENT
Start: 2023-01-04 | End: 2023-01-05

## 2023-01-04 RX ORDER — HYDRALAZINE HYDROCHLORIDE 20 MG/ML
10 INJECTION INTRAMUSCULAR; INTRAVENOUS EVERY 4 HOURS PRN
Status: DISCONTINUED | OUTPATIENT
Start: 2023-01-04 | End: 2023-01-05

## 2023-01-04 RX ORDER — DEXTROSE MONOHYDRATE 25 G/50ML
50 INJECTION, SOLUTION INTRAVENOUS
Status: DISCONTINUED | OUTPATIENT
Start: 2023-01-04 | End: 2023-01-05

## 2023-01-04 RX ORDER — ACETAMINOPHEN 325 MG/1
650 TABLET ORAL EVERY 6 HOURS PRN
Status: DISCONTINUED | OUTPATIENT
Start: 2023-01-04 | End: 2023-01-05

## 2023-01-04 RX ORDER — SODIUM CHLORIDE 9 MG/ML
INJECTION, SOLUTION INTRAVENOUS CONTINUOUS
Status: DISCONTINUED | OUTPATIENT
Start: 2023-01-04 | End: 2023-01-05

## 2023-01-04 NOTE — PLAN OF CARE
Patient VSS, no acute changes during shift. Updated patient and son on plan of care. Pharmacy closed at time of admission, med rec needs to be completed. Endorsed to day shift RN. Frequent rounding, no needs at this time. Call light always in reach and safety precautions in place. Problem: Patient Centered Care  Goal: Patient preferences are identified and integrated in the patient's plan of care  Description: Interventions:  - What would you like us to know as we care for you? I live at home alone  - Provide timely, complete, and accurate information to patient/family  - Incorporate patient and family knowledge, values, beliefs, and cultural backgrounds into the planning and delivery of care  - Encourage patient/family to participate in care and decision-making at the level they choose  - Honor patient and family perspectives and choices  Outcome: Progressing     Problem: Patient/Family Goals  Goal: Patient/Family Long Term Goal  Description: Patient's Long Term Goal: Get better    Interventions:  - PT/OT  -Monitor O2  -Manage any pain  - See additional Care Plan goals for specific interventions  Outcome: Progressing  Goal: Patient/Family Short Term Goal  Description: Patient's Short Term Goal: No need for O2    Interventions:   - Wean as needed  -Monitor O2 with cont. Pulse ox. - See additional Care Plan goals for specific interventions  Outcome: Progressing     Problem: SAFETY ADULT - FALL  Goal: Free from fall injury  Description: INTERVENTIONS:  - Assess pt frequently for physical needs  - Identify cognitive and physical deficits and behaviors that affect risk of falls.   - [place] fall precautions as indicated by assessment.  - Educate pt/family on patient safety including physical limitations  - Instruct pt to call for assistance with activity based on assessment  - Modify environment to reduce risk of injury  - Provide assistive devices as appropriate  - Consider OT/PT consult to assist with strengthening/mobility  - Encourage toileting schedule  Outcome: Progressing     Problem: MUSCULOSKELETAL - ADULT  Goal: Return mobility to safest level of function  Description: INTERVENTIONS:  - Assess patient stability and activity tolerance for standing, transferring and ambulating w/ or w/o assistive devices  - Assist with transfers and ambulation using safe patient handling equipment as needed  - Ensure adequate protection for wounds/incisions during mobilization  - Obtain PT/OT consults as needed  - Advance activity as appropriate  - Communicate ordered activity level and limitations with patient/family  Outcome: Progressing

## 2023-01-04 NOTE — PLAN OF CARE
Problem: Patient Centered Care  Goal: Patient preferences are identified and integrated in the patient's plan of care  Description: Interventions:  - What would you like us to know as we care for you?   - Provide timely, complete, and accurate information to patient/family  - Incorporate patient and family knowledge, values, beliefs, and cultural backgrounds into the planning and delivery of care  - Encourage patient/family to participate in care and decision-making at the level they choose  - Honor patient and family perspectives and choices  Outcome: Progressing     Problem: Patient/Family Goals  Goal: Patient/Family Long Term Goal  Description: Patient's Long Term Goal: to return home    Interventions:  - PT/OT  -IV antibiotics  - See additional Care Plan goals for specific interventions  Outcome: Progressing  Goal: Patient/Family Short Term Goal  Description: Patient's Short Term Goal: to feel better    Interventions:   - follow MD recommendations  - See additional Care Plan goals for specific interventions  Outcome: Progressing     Problem: SAFETY ADULT - FALL  Goal: Free from fall injury  Description: INTERVENTIONS:  - Assess pt frequently for physical needs  - Identify cognitive and physical deficits and behaviors that affect risk of falls.   - [place] fall precautions as indicated by assessment.  - Educate pt/family on patient safety including physical limitations  - Instruct pt to call for assistance with activity based on assessment  - Modify environment to reduce risk of injury  - Provide assistive devices as appropriate  - Consider OT/PT consult to assist with strengthening/mobility  - Encourage toileting schedule  Outcome: Progressing     Problem: MUSCULOSKELETAL - ADULT  Goal: Return mobility to safest level of function  Description: INTERVENTIONS:  - Assess patient stability and activity tolerance for standing, transferring and ambulating w/ or w/o assistive devices  - Assist with transfers and ambulation using safe patient handling equipment as needed  - Ensure adequate protection for wounds/incisions during mobilization  - Obtain PT/OT consults as needed  - Advance activity as appropriate  - Communicate ordered activity level and limitations with patient/family  Outcome: Progressing

## 2023-01-04 NOTE — ED INITIAL ASSESSMENT (HPI)
Patient arrived via EMS for increased weakness while going to the bathroom using a walker and went down to her knee and fall . Patient was here yesterday morning for UTI.

## 2023-01-04 NOTE — CM/SW NOTE
Department  notified of request for alexander Lucia referrals started. Assigned CM/SW to follow up with pt/family on further discharge planning.      Myra Gibson   January 04, 2023   14:58

## 2023-01-04 NOTE — CM/SW NOTE
Received MDO for discharge planning. PT/OT recommending home health. Met with patient and daughter Juan Hernandez for assessment. Patient lives alone in a ranch style home w/ 3 SABA. Patient owns a walker, cane and wheelchair. She is independent at baseline. She has a son and daughter who live nearby and visit weekly. Patient's grandson will likely be moving in w/ her. Patient had [address] (4/2020). Denies h/o SNF. Discussed discharge plan. Patient will return home w/ family support. She is agreeable to home health services. Initiated referral via [facility], [id number] sent. SW/CM to f/u with list of options & choice.     Demarco iVzcaino, [address]

## 2023-01-05 VITALS
WEIGHT: 169.38 LBS | HEIGHT: 66 IN | OXYGEN SATURATION: 91 % | RESPIRATION RATE: 18 BRPM | TEMPERATURE: 98 F | BODY MASS INDEX: 27.22 KG/M2 | DIASTOLIC BLOOD PRESSURE: 55 MMHG | SYSTOLIC BLOOD PRESSURE: 145 MMHG | HEART RATE: 78 BPM

## 2023-01-05 LAB — GLUCOSE BLDC GLUCOMTR-MCNC: 157 MG/DL (ref 70–99)

## 2023-01-05 PROCEDURE — 99239 HOSP IP/OBS DSCHRG MGMT >30: CPT | Performed by: INTERNAL MEDICINE

## 2023-01-05 NOTE — DISCHARGE INSTRUCTIONS
Sometimes managing your health at home requires assistance. The [facility] team has recognized your preference to use [facility]. They can be reached by phone at [phone]. The fax number for your reference is [phone]. A representative from the home health agency will contact you or your family to schedule your first visit.

## 2023-01-05 NOTE — PLAN OF CARE
Patient VSS, no acute changes during shift. Updated patient on plan of care. Frequent rounding, no needs at this time. Call light always in reach and safety precautions in place. Problem: Patient Centered Care  Goal: Patient preferences are identified and integrated in the patient's plan of care  Description: Interventions:  - What would you like us to know as we care for you? I am from home alone  - Provide timely, complete, and accurate information to patient/family  - Incorporate patient and family knowledge, values, beliefs, and cultural backgrounds into the planning and delivery of care  - Encourage patient/family to participate in care and decision-making at the level they choose  - Honor patient and family perspectives and choices  Outcome: Progressing     Problem: Patient/Family Goals  Goal: Patient/Family Long Term Goal  Description: Patient's Long Term Goal: to return home    Interventions:  - PT/OT  -IV antibiotics  - See additional Care Plan goals for specific interventions  Outcome: Progressing  Goal: Patient/Family Short Term Goal  Description: Patient's Short Term Goal: to feel better    Interventions:   - follow MD recommendations  - See additional Care Plan goals for specific interventions  Outcome: Progressing     Problem: SAFETY ADULT - FALL  Goal: Free from fall injury  Description: INTERVENTIONS:  - Assess pt frequently for physical needs  - Identify cognitive and physical deficits and behaviors that affect risk of falls.   - [place] fall precautions as indicated by assessment.  - Educate pt/family on patient safety including physical limitations  - Instruct pt to call for assistance with activity based on assessment  - Modify environment to reduce risk of injury  - Provide assistive devices as appropriate  - Consider OT/PT consult to assist with strengthening/mobility  - Encourage toileting schedule  Outcome: Progressing     Problem: MUSCULOSKELETAL - ADULT  Goal: Return mobility to safest level of function  Description: INTERVENTIONS:  - Assess patient stability and activity tolerance for standing, transferring and ambulating w/ or w/o assistive devices  - Assist with transfers and ambulation using safe patient handling equipment as needed  - Ensure adequate protection for wounds/incisions during mobilization  - Obtain PT/OT consults as needed  - Advance activity as appropriate  - Communicate ordered activity level and limitations with patient/family  Outcome: Progressing

## 2023-01-05 NOTE — PROGRESS NOTES
Pt discharged, daughter provided transportation home. Discharge paperwork and prescriptions reviewed, all questions and concerns addressed. IV access discontinued.

## 2023-01-05 NOTE — PLAN OF CARE
Problem: Patient Centered Care  Goal: Patient preferences are identified and integrated in the patient's plan of care  Description: Interventions:  - What would you like us to know as we care for you?   - Provide timely, complete, and accurate information to patient/family  - Incorporate patient and family knowledge, values, beliefs, and cultural backgrounds into the planning and delivery of care  - Encourage patient/family to participate in care and decision-making at the level they choose  - Honor patient and family perspectives and choices  Outcome: Progressing     Problem: Patient/Family Goals  Goal: Patient/Family Long Term Goal  Description: Patient's Long Term Goal: to return home    Interventions:  - IV antibiotics  -fluids  - See additional Care Plan goals for specific interventions  Outcome: Progressing  Goal: Patient/Family Short Term Goal  Description: Patient's Short Term Goal: to feel better    Interventions:   - follow MD recommendations  - See additional Care Plan goals for specific interventions  Outcome: Progressing     Problem: SAFETY ADULT - FALL  Goal: Free from fall injury  Description: INTERVENTIONS:  - Assess pt frequently for physical needs  - Identify cognitive and physical deficits and behaviors that affect risk of falls.   - [place] fall precautions as indicated by assessment.  - Educate pt/family on patient safety including physical limitations  - Instruct pt to call for assistance with activity based on assessment  - Modify environment to reduce risk of injury  - Provide assistive devices as appropriate  - Consider OT/PT consult to assist with strengthening/mobility  - Encourage toileting schedule  Outcome: Progressing     Problem: MUSCULOSKELETAL - ADULT  Goal: Return mobility to safest level of function  Description: INTERVENTIONS:  - Assess patient stability and activity tolerance for standing, transferring and ambulating w/ or w/o assistive devices  - Assist with transfers and ambulation using safe patient handling equipment as needed  - Ensure adequate protection for wounds/incisions during mobilization  - Obtain PT/OT consults as needed  - Advance activity as appropriate  - Communicate ordered activity level and limitations with patient/family  Outcome: Progressing

## 2023-01-05 NOTE — HOME CARE LIAISON
Discussed with patient, list of [facility] providers from [facility], patient choice is [address]. Agency reserved in [facility] and contact information placed on AVS. Financial interest disclosure provided to patient. SHAREE Beatty updated.

## 2023-01-05 NOTE — DISCHARGE SUMMARY
[facility]    Discharge Summary    Callie Harp Patient Status:  Observation    1933 MRN [id number]   Location [facility] Attending No att. providers found   Hosp Day # 0 PCP Ángela Spangler MD     Date of Admission: 2023      Date of Discharge: 2023 12:22 PM      Admitting Diagnosis: Weakness generalized [R53.1]  Hyperglycemia [R73.9]  Acute cystitis without hematuria [N30.00]    Hospital Discharge Diagnoses:   Weakness, fall secondary to UTI  E. coli cystitis      Lace+ Score: 52  59-90 High Risk  29-58 Medium Risk  0-28   Low Risk. TCM Follow-Up Recommendation:  LACE 29-58: Moderate Risk of readmission after discharge from the hospital.          Problem List: Patient Active Problem List:     Recurrent UTI     Microhematuria     Urethral syndrome     Primary osteoarthritis, left shoulder     Diabetes type 2, controlled ([address]     Hypertension     Dyslipidemia     Community acquired pneumonia of left lower lobe of lung     Hypoxia     Weakness generalized     Acute cystitis without hematuria     Hyperglycemia        Physical Exam:   Vitals History 2023   /55 145/55   BP Location - Right arm   Patient Position - -   Cuff Size - -   Pulse - 78   Resp - 18   Temp - 97.7   SpO2 - 91   Weight - -   Height - -   BODY MASS INDEX - -      General:  Alert and oriented. Diffuse skin problem:  None. Eye:  Pupils are equal, round and reactive to light, extraocular movements are intact, Normal conjunctiva. HENT:  Normocephalic, oral mucosa is moist.  Head:  Normocephalic, atraumatic. Neck:  Supple, non-tender, no carotid bruit, no jugular venous distention, no lymphadenopathy, no thyromegaly. Respiratory:  Lungs are clear to auscultation, respirations are non-labored, breath sounds are equal, symmetrical chest wall expansion. Cardiovascular:  Normal rate, regular rhythm, no murmur, no edema.   Gastrointestinal:  Soft, non-tender, non-distended, normal bowel sounds, no organomegaly. Lymphatics:  No lymphadenopathy neck, axilla, groin. Musculoskeletal: Normal range of motion. normal strength. Feet:  Normal pulses. Neurologic:  Alert, oriented, no focal deficits, cranial nerves II-XII are grossly intact. Cognition and Speech:  Oriented, speech clear and coherent. Psychiatric:  Cooperative, appropriate mood & affect. History of Present Illness: (From chart)  Rossy Obrien is a(n) 80year old female, with a past medical history significant for hypertension diabetes and hyperlipidemia presents with a complaint of increasing weakness fall where she was unable to get up from the floor. Patient was recently diagnosed with a UTI was started on oral antibiotics, however claims she has been getting progressively weaker, was attempting to go to the bathroom with the assistance of a walker however her legs gave out and she fell to the ground, unable to pull herself up. She used her life alert switch to notify EMS and was brought into the ER for further evaluation. She denies any fever chills denies any head injury loss of consciousness headache dizziness blurry vision focal numbness weakness or tingling. She denies any chest pain palpitations or shortness of breath. No leukocytosis or fever. Hospital Course: Patient was admitted and started on antibiotics. Her weakness significantly improved overnight. Urine cultures grew E. coli from 1/3. Resistant to nitrofurantoin. Patient sensitive to cephalosporins and she will be discharged back on oral regimen today. Denies any complaints. Sera Sprain to go home. Daughter at bedside. Discussed at length regarding her urinary history and outpatient recommendations. Patient given follow-up instructions and medications reviewed. Remained afebrile while here. Was eval by PT OT and they recommend home with outpatient PT OT.     Discharge Condition: Good    Discharge Medications:      Discharge Medications      CHANGE how you take these medications      Instructions Prescription details   cephalexin 500 MG Caps  Commonly known as: Keflex  What changed: Another medication with the same name was removed. Continue taking this medication, and follow the directions you see here. Take 1 capsule (500 mg total) by mouth 2 (two) times daily for 7 days. Stop taking on: January 10, 2023  Quantity: 14 capsule  Refills: 0        CONTINUE taking these medications      Instructions Prescription details   aspirin 81 MG Chew      Chew 81 mg by mouth. Refills: 0     atorvastatin 40 MG Tabs  Commonly known as: Lipitor      Take 40 mg by mouth daily. Refills: 0     FreeStyle Lancets Misc       Refills: 0     Glucose Blood Strp       Refills: 0     insulin lispro 100 UNIT/ML Soln  Commonly known as: Humalog      3 times a day with meals-using up to 6 units three times a day   Refills: 0     Insulin Syringes (Disposable) U-100 0.5 ML Misc       Refills: 0     Lantus SoloStar 100 UNIT/ML Sopn  Generic drug: insulin glargine      Inject 20 Units into the skin nightly. Refills: 0     lisinopril 40 MG Tabs  Commonly known as: Prinivil; Zestril      Take 40 mg by mouth daily. Refills: 0     LUTEIN OR      Take 1 tablet by mouth daily. Refills: 0     metFORMIN  MG Tb24  Commonly known as: Glucophage XR      Take 500 mg by mouth 3 (three) times daily. Refills: 0     metoprolol succinate  MG Tb24  Commonly known as: Toprol XL      Take 100 mg by mouth daily. Refills: 0     Multi For Her 50+ Caps      Take 1 tablet by mouth. Refills: 0        STOP taking these medications    Fish Oil 1200 MG Caps        nitrofurantoin macrocrystal 50 MG Caps  Commonly known as: Macrodantin           ASK your doctor about these medications      Instructions Prescription details   fluticasone propionate 50 MCG/ACT Susp  Commonly known as: Flonase      1 spray by Nasal route 2 (two) times daily.    Quantity: 1 Bottle  Refills: 3            Outpatient follow-up with urology and primary care doctor within 1 week.     Gerardo Payan MD  1/5/2023  4:29 PM    Greater than 30 minutes spent on preparation and coordination of this discharge

## 2023-01-09 ENCOUNTER — TELEPHONE (OUTPATIENT)
Dept: SURGERY | Facility: CLINIC | Age: 88
End: 2023-01-09

## 2023-01-09 NOTE — TELEPHONE ENCOUNTER
Patient states she has a UTI and would like to see what she can take other than her Cephalexin pills.  Please advise

## 2023-01-09 NOTE — TELEPHONE ENCOUNTER
I called pt and informed her of all of the info and instructions in AR's response msg as stated below and pt verbalized understanding and compliance and I told her that I would also send th info by my chart. (Newest Message First)   Rosalie Vargas MD  You 2 hours ago (12:01 PM)     Kd Chavez,   No need for antibiotics if she is not having symptoms. Even if the culture is positive, we would not and should not treat if she is not having symptoms. Would not recommend further antibitoics at this point. She can increase her hydration and practice timed voiding, double voiding, women's probitoics (genitourinary health/vaginal health with lactobacillus), Ellura or Utiva or Crancap cranberry pills (36mg Proanthocyanids seen on the ingredients). She can discuss estrace cream with Kristy Moeller when she gets back. Would arrange for her to see Kristy Moeller after she returns from maternity leave. If patient develops symptoms, flank pain, fevers, chills, urgency, cloudy or smelly urine etc she can contact us or go to urgent care or ER.      Hope this helps,   [address]

## 2023-05-17 ENCOUNTER — OFFICE VISIT (OUTPATIENT)
Dept: PODIATRY CLINIC | Facility: CLINIC | Age: 88
End: 2023-05-17

## 2023-05-17 DIAGNOSIS — B35.1 ONYCHOMYCOSIS: Primary | ICD-10-CM

## 2023-05-17 DIAGNOSIS — E11.42 TYPE 2 DIABETES MELLITUS WITH POLYNEUROPATHY (HCC): ICD-10-CM

## 2023-05-17 DIAGNOSIS — R26.81 GAIT INSTABILITY: ICD-10-CM

## 2023-05-17 PROCEDURE — 99203 OFFICE O/P NEW LOW 30 MIN: CPT | Performed by: PODIATRIST

## 2023-05-17 PROCEDURE — 11721 DEBRIDE NAIL 6 OR MORE: CPT | Performed by: PODIATRIST

## 2023-08-22 ENCOUNTER — OFFICE VISIT (OUTPATIENT)
Dept: PODIATRY CLINIC | Facility: CLINIC | Age: 88
End: 2023-08-22

## 2023-08-22 DIAGNOSIS — E11.42 TYPE 2 DIABETES MELLITUS WITH POLYNEUROPATHY (HCC): ICD-10-CM

## 2023-08-22 DIAGNOSIS — R26.81 GAIT INSTABILITY: ICD-10-CM

## 2023-08-22 DIAGNOSIS — B35.1 ONYCHOMYCOSIS: Primary | ICD-10-CM

## 2023-08-22 PROCEDURE — 99213 OFFICE O/P EST LOW 20 MIN: CPT | Performed by: PODIATRIST

## 2023-08-22 NOTE — PROGRESS NOTES
[facility], [facility] Podiatry  Progress Note    Lisbeth Lloyd is a 80year old female. Patient presents with:  Diabetic Foot Care: 3 mo f/u - last WeX3Q=6.6 from 1/4/23 - has no c/o regarding her feet - this AM her BQ=935        HPI:     This is a pleasant diabetic female. She does use a walker for ambulation. She does have numbness and tingling to her feet. She presents to clinic today for diabetic foot care. Allergies: Bactrim [Sulfamethoxazole W/Trimethoprim] and Shellfish-Derived Products   Current Outpatient Medications   Medication Sig Dispense Refill    Fluticasone Propionate 50 MCG/ACT Nasal Suspension 1 spray by Nasal route 2 (two) times daily. (Patient taking differently: 1 spray by Nasal route 2 (two) times daily as needed.  ) 1 Bottle 3    LUTEIN OR Take 1 tablet by mouth daily. MetFORMIN HCl  MG Oral Tablet 24 Hr Take 500 mg by mouth 3 (three) times daily. LANTUS SOLOSTAR 100 UNIT/ML Subcutaneous Solution Pen-injector Inject 20 Units into the skin nightly. Insulin Syringes, Disposable, U-100 0.5 ML Does not apply Misc       FreeStyle Lancets Does not apply Misc       aspirin 81 MG Oral Chew Tab Chew 81 mg by mouth. Glucose Blood In Vitro Strip       Insulin Lispro, Human, (HUMALOG) 100 UNIT/ML Subcutaneous Solution 3 times a day with meals-using up to 6 units three times a day      Atorvastatin Calcium (LIPITOR) 40 MG Oral Tab Take 40 mg by mouth daily. lisinopril (PRINIVIL,ZESTRIL) 40 MG Oral Tab Take 40 mg by mouth daily. metoprolol succinate  MG Oral Tablet 24 Hr Take 100 mg by mouth daily. Multiple Vitamins-Minerals (MULTI FOR HER 50+) Oral Cap Take 1 tablet by mouth.           Past Medical History:   Diagnosis Date    Arthritis     Diabetes ([facility] [address]     High blood pressure     High cholesterol     Hyperlipidemia     Osteoarthritis     Schatzki's ring 3/14    Unspecified essential hypertension     UTI (urinary tract infection) Visual impairment       Past Surgical History:   Procedure Laterality Date    ANESTH,SHOULDER REPLACEMENT Left 16    DR Leticia Moore    APPENDECTOMY      APPENDECTOMY      COLONOSCOPY      D & C      DILATION/CURETTAGE,DIAGNOSTIC      EGD  3/14      Family History   Problem Relation Age of Onset    Diabetes Father     Stroke Father     Diabetes Mother     Cancer Brother         liver    Cancer Other         family h/o of prostate      Social History    Socioeconomic History      Marital status:     Tobacco Use      Smoking status: Former        Types: Cigarettes        Quit date:         Years since quittin.6      Smokeless tobacco: Never    Substance and Sexual Activity      Alcohol use: Yes        Comment: occasionally      Drug use: No          REVIEW OF SYSTEMS:   Denies nausea, fever, chills  No calf pain  No other muscle or joint aches  Denies chest pain or shortness of breath. EXAM:   There were no vitals taken for this visit. Constitutional:   Patient in no apparent distress. Well kept. Of normal body habitus. Alert and oriented to person, place, and time. Vascular Examination:  DP pulse is 2/4  PT pulse is NP due to edema  Capillary refill is immediate  Edema is present bilateral ankles  Integumentary Examination:   The patient's nails appear incurvated, thickened, elongated, dystrophic, discolored with subungual debris 1-5 right, 1-5  left nails. Digital hair growth is absent  Skin is of diminished texture and decreased turgor. Neurological Examination:  Monofilament (10-g) sensation is 4/5 to right and 4/5 to left. Sharp/dull is present to right and is present to left. Parasthesias present  Musculoskeletal Examination:  Muscle Strength is 4/5.           LABS & IMAGING:     Lab Results   Component Value Date     (H) 2023    BUN 17 2023    CREATSERUM 0.87 2023    BUNCREA 19.5 2023    ANIONGAP 8 2023    GFRAA 57 (L) 04/10/2022    GFRNAA 49 (L) 04/10/2022    CA 8.9 01/04/2023     01/04/2023    K 4.1 01/04/2023     01/04/2023    CO2 26.0 01/04/2023    OSMOCALC 295 01/04/2023        Lab Results   Component Value Date     (H) 01/04/2023    A1C 7.6 (H) 01/04/2023        No results found. ASSESSMENT AND PLAN:   Diagnoses and all orders for this visit:    Onychomycosis    Type 2 diabetes mellitus with polyneuropathy ([facility] [address]    Gait instability          Plan:     Diabetic education and instructions have been provided. We reviewed and discussed the following:    -risk categories related to pts with diabetes and foot or lower extremity complications per ADA. -adherence to medication regimen and close monitoring or blood sugar control.   -daily monitoring/inspection of feet and shoes.   -proper management of diet and weight   -regular follow up with PCP and specialty providers as recommended   -Lower extremity complications related to DM were reviewed and stressed prevention. Evaluated the patient. Discussed treatment options with the patient. Discussed with patient proper care and hygiene for their feet. Patient tolerated procedures well, without incident. Instrumentation used includes nail nippers and electric  where appropriate. Procedure: (95873 Debridement of toenails 6-10) Surgically debrided and mechanically reduced 6 or more toenails. Hemmorhage occurred none. Nails that were debrided appeared dystrophic and caused the patient pain in shoe gear. Nails 5 Left & 5 Right. RTC 3 months for [facility]    No follow-ups on file.     Zachary Tamayo DPM  8/22/23

## 2023-11-28 ENCOUNTER — OFFICE VISIT (OUTPATIENT)
Dept: PODIATRY CLINIC | Facility: CLINIC | Age: 88
End: 2023-11-28
Payer: MEDICARE

## 2023-11-28 DIAGNOSIS — R26.81 GAIT INSTABILITY: ICD-10-CM

## 2023-11-28 DIAGNOSIS — E11.42 TYPE 2 DIABETES MELLITUS WITH POLYNEUROPATHY (HCC): ICD-10-CM

## 2023-11-28 DIAGNOSIS — B35.1 ONYCHOMYCOSIS: Primary | ICD-10-CM

## 2023-11-28 PROCEDURE — 99213 OFFICE O/P EST LOW 20 MIN: CPT | Performed by: PODIATRIST

## 2023-11-28 NOTE — PROGRESS NOTES
[facility], [facility] Podiatry  Progress Note    Bertha Oh is a 80year old female. Chief Complaint   Patient presents with    Diabetic Foot Care     3 mo f/u - last IsA8I=2.9 from 10/31/23 - has no c/o regarding her feet - this AM she did not checked her BS         HPI:     This is a pleasant diabetic female. She does use a walker for ambulation. She does have numbness and tingling to her feet. She presents to clinic today for diabetic foot care. Allergies: Bactrim [sulfamethoxazole w/trimethoprim] and Shellfish-derived products   Current Outpatient Medications   Medication Sig Dispense Refill    Fluticasone Propionate 50 MCG/ACT Nasal Suspension 1 spray by Nasal route 2 (two) times daily. (Patient taking differently: 1 spray by Nasal route 2 (two) times daily as needed.  ) 1 Bottle 3    LUTEIN OR Take 1 tablet by mouth daily. MetFORMIN HCl  MG Oral Tablet 24 Hr Take 500 mg by mouth 3 (three) times daily. LANTUS SOLOSTAR 100 UNIT/ML Subcutaneous Solution Pen-injector Inject 20 Units into the skin nightly. Insulin Syringes, Disposable, U-100 0.5 ML Does not apply Misc       FreeStyle Lancets Does not apply Misc       aspirin 81 MG Oral Chew Tab Chew 81 mg by mouth. Glucose Blood In Vitro Strip       Insulin Lispro, Human, (HUMALOG) 100 UNIT/ML Subcutaneous Solution 3 times a day with meals-using up to 6 units three times a day      Atorvastatin Calcium (LIPITOR) 40 MG Oral Tab Take 40 mg by mouth daily. lisinopril (PRINIVIL,ZESTRIL) 40 MG Oral Tab Take 40 mg by mouth daily. metoprolol succinate  MG Oral Tablet 24 Hr Take 100 mg by mouth daily. Multiple Vitamins-Minerals (MULTI FOR HER 50+) Oral Cap Take 1 tablet by mouth.           Past Medical History:   Diagnosis Date    Arthritis     Diabetes ([address]     High blood pressure     High cholesterol     Hyperlipidemia     Osteoarthritis     Schatzki's ring 3/14    Unspecified essential hypertension     UTI (urinary tract infection)     Visual impairment       Past Surgical History:   Procedure Laterality Date    ANESTH,SHOULDER REPLACEMENT Left 16    DR Tisha Wise    APPENDECTOMY      APPENDECTOMY      COLONOSCOPY      D & C      DILATION/CURETTAGE,DIAGNOSTIC      EGD  3/14      Family History   Problem Relation Age of Onset    Diabetes Father     Stroke Father     Diabetes Mother     Cancer Brother         liver    Cancer Other         family h/o of prostate      Social History     Socioeconomic History    Marital status:    Tobacco Use    Smoking status: Former     Types: Cigarettes     Quit date:      Years since quittin.9    Smokeless tobacco: Never   Substance and Sexual Activity    Alcohol use: Yes     Comment: occasionally    Drug use: No           REVIEW OF SYSTEMS:   Denies nausea, fever, chills  No calf pain  No other muscle or joint aches  Denies chest pain or shortness of breath. EXAM:   There were no vitals taken for this visit. Constitutional:   Patient in no apparent distress. Well kept. Of normal body habitus. Alert and oriented to person, place, and time. Vascular Examination:  DP pulse is 2/4  PT pulse is NP due to edema  Capillary refill is immediate  Edema is present bilateral ankles  Integumentary Examination:   The patient's nails appear incurvated, thickened, elongated, dystrophic, discolored with subungual debris 1-5 right, 1-5  left nails. Digital hair growth is absent  Skin is of diminished texture and decreased turgor. Neurological Examination:  Monofilament (10-g) sensation is 4/5 to right and 4/5 to left. Sharp/dull is present to right and is present to left. Parasthesias present  Musculoskeletal Examination:  Muscle Strength is 4/5.           LABS & IMAGING:     Lab Results   Component Value Date     (H) 2023    BUN 17 2023    CREATSERUM 0.87 2023    BUNCREA 19.5 2023    ANIONGAP 8 2023    GFRAA 57 (L) 04/10/2022    GFRNAA 49 (L) 04/10/2022    CA 8.9 01/04/2023     01/04/2023    K 4.1 01/04/2023     01/04/2023    CO2 26.0 01/04/2023    OSMOCALC 295 01/04/2023        Lab Results   Component Value Date     (H) 01/04/2023    A1C 7.6 (H) 01/04/2023        No results found. ASSESSMENT AND PLAN:   Diagnoses and all orders for this visit:    Onychomycosis    Type 2 diabetes mellitus with polyneuropathy ([facility] [address]    Gait instability            Plan:     Diabetic education and instructions have been provided. We reviewed and discussed the following:    -risk categories related to pts with diabetes and foot or lower extremity complications per ADA. -adherence to medication regimen and close monitoring or blood sugar control.   -daily monitoring/inspection of feet and shoes.   -proper management of diet and weight   -regular follow up with PCP and specialty providers as recommended   -Lower extremity complications related to DM were reviewed and stressed prevention. Evaluated the patient. Discussed treatment options with the patient. Discussed with patient proper care and hygiene for their feet. Patient tolerated procedures well, without incident. Instrumentation used includes nail nippers and electric  where appropriate. Procedure: (93214 Debridement of toenails 6-10) Surgically debrided and mechanically reduced 6 or more toenails. Hemmorhage occurred none. Nails that were debrided appeared dystrophic and caused the patient pain in shoe gear. Nails 5 Left & 5 Right. RTC 3 months for [facility]    No follow-ups on file.     Taj Tillman, DPHU  11/28/23

## 2024-04-02 ENCOUNTER — OFFICE VISIT (OUTPATIENT)
Dept: ORTHOPEDICS CLINIC | Facility: CLINIC | Age: 89
End: 2024-04-02
Payer: MEDICARE

## 2024-04-02 ENCOUNTER — HOSPITAL ENCOUNTER (OUTPATIENT)
Dept: GENERAL RADIOLOGY | Facility: HOSPITAL | Age: 89
Discharge: HOME | End: 2024-04-02
Attending: ORTHOPAEDIC SURGERY
Payer: MEDICARE

## 2024-04-02 DIAGNOSIS — M25.551 RIGHT HIP PAIN: ICD-10-CM

## 2024-04-02 DIAGNOSIS — M16.11 PRIMARY OSTEOARTHRITIS OF RIGHT HIP: Primary | ICD-10-CM

## 2024-04-02 PROCEDURE — 99204 OFFICE O/P NEW MOD 45 MIN: CPT | Performed by: ORTHOPAEDIC SURGERY

## 2024-04-02 PROCEDURE — 73502 X-RAY EXAM HIP UNI 2-3 VIEWS: CPT | Performed by: ORTHOPAEDIC SURGERY

## 2024-04-02 NOTE — H&P
NURSING INTAKE COMMENTS:   Chief Complaint   Patient presents with    Knee Pain     Consult right hip pain travels to ankle. Pain 8/10- onset a yr- no injury.        HPI: This 89 year old female presents today with her daughter with end-stage osteoarthritis of the right hip and leg shortening on the right side.  She has had many years of right hip pain.  X-rays in the office today show complete bone-on-bone and shortening of the right hip compared to the left.  The left hip is relatively normal and pain-free.    Her mind is sharp.  She lives with her grandson in a house with no stairs.  She does not drive.  She ambulates with a rolling walker around the house.  She does not get out much.  She has diabetes but denies neuropathy except the right fourth finger.  She is a non-smoker.    Past Medical History:   Diagnosis Date    Arthritis     Diabetes (HCC)     High blood pressure     High cholesterol     Hyperlipidemia     Osteoarthritis     Schatzki's ring 3/14    Unspecified essential hypertension     UTI (urinary tract infection)     Visual impairment      Past Surgical History:   Procedure Laterality Date    ANESTH,SHOULDER REPLACEMENT Left 11/02/16    DR LEWIS    APPENDECTOMY      APPENDECTOMY      COLONOSCOPY      D & C      DILATION/CURETTAGE,DIAGNOSTIC      EGD  3/14     Current Outpatient Medications   Medication Sig Dispense Refill    LUTEIN OR Take 1 tablet by mouth daily.        MetFORMIN HCl  MG Oral Tablet 24 Hr Take 1 tablet (500 mg total) by mouth 3 (three) times daily.      LANTUS SOLOSTAR 100 UNIT/ML Subcutaneous Solution Pen-injector Inject 20 Units into the skin nightly.      Insulin Syringes, Disposable, U-100 0.5 ML Does not apply Misc       FreeStyle Lancets Does not apply Misc       Glucose Blood In Vitro Strip       Insulin Lispro, Human, (HUMALOG) 100 UNIT/ML Subcutaneous Solution 3 times a day with meals-using up to 6 units three times a day      Atorvastatin Calcium (LIPITOR) 40 MG Oral  Tab Take 1 tablet (40 mg total) by mouth daily.      lisinopril (PRINIVIL,ZESTRIL) 40 MG Oral Tab Take 1 tablet (40 mg total) by mouth daily.      metoprolol succinate  MG Oral Tablet 24 Hr Take 1 tablet (100 mg total) by mouth daily.      Multiple Vitamins-Minerals (MULTI FOR HER 50+) Oral Cap Take 1 tablet by mouth.        Fluticasone Propionate 50 MCG/ACT Nasal Suspension 1 spray by Nasal route 2 (two) times daily. (Patient taking differently: 1 spray by Nasal route 2 (two) times daily as needed.  ) 1 Bottle 3    aspirin 81 MG Oral Chew Tab Chew 81 mg by mouth.       Allergies   Allergen Reactions    Bactrim [Sulfamethoxazole W/Trimethoprim] RASH     rash    Shellfish-Derived Products      Other reaction(s): (DO NOT USE, NOT SCREENED) SHELLFISH CONT PROD     Family History   Problem Relation Age of Onset    Diabetes Father     Stroke Father     Diabetes Mother     Cancer Brother         liver    Cancer Other         family h/o of prostate     No family Hx of DVT/PE    Social History     Occupational History    Not on file   Tobacco Use    Smoking status: Former     Types: Cigarettes     Quit date:      Years since quittin.2    Smokeless tobacco: Never   Substance and Sexual Activity    Alcohol use: Yes     Comment: occasionally    Drug use: No    Sexual activity: Not on file        Review of Systems:  GENERAL: feels generally well, no significant weight loss or weight gain  SKIN: no ulcerated or worrisome skin lesions  EYES:denies blurred vision or double vision  HEENT: denies new nasal congestion, sinus pain or ST  LUNGS: denies shortness of breath  CARDIOVASCULAR: denies chest pain  GI: no hematemesis, no worsening heartburn, no diarrhea  : no dysuria, no blood in urine, no difficulty urinating, no incontinence  MUSCULOSKELETAL: no other musculoskeletal complaints other than in HPI  NEURO: no numbness or tingling, no weakness or balance disorder  PSYCHE: no depression or anxiety  HEMATOLOGIC:  no hx of blood dyscrasia, no Hx DVT/PE  ENDOCRINE: no thyroid or diabetes issues  ALL/ASTHMA: no new hx of severe allergy or asthma    Physical Examination:    There were no vitals taken for this visit.  Constitutional: appears well hydrated, alert and responsive, no acute distress noted  Extremities: Both legs had just a little swelling but no pitting edema.  Her daughter said that was pretty good for her mom.  The right leg is almost 3 inches shorter than the left.  Musculoskeletal: Internal and external rotation of the left hip was within normal limits and pain-free.  The right hip had limited internal rotation with groin pain.  Neurological: Normal motor and sensory to the feet and toes.  She denies neuropathy in the feet.    Imaging: X-rays show end-stage osteoarthritis of the right hip with shortening compared to the left hip which is relatively normal.      No results found.     Lab Results   Component Value Date    WBC 8.1 01/04/2023    HGB 13.2 01/04/2023    .0 01/04/2023      Lab Results   Component Value Date     (H) 01/04/2023    BUN 17 01/04/2023    CREATSERUM 0.87 01/04/2023    GFRNAA 49 (L) 04/10/2022    GFRAA 57 (L) 04/10/2022        Assessment and Plan:  Diagnoses and all orders for this visit:    Primary osteoarthritis of right hip  -     XR FLUOROSCOPIC GUIDANCE NEEDLE PLACEMENT (CPT=77002); Future    Right hip pain  -     Cancel: XR HIP + PELVIS MIN 4 VIEWS RIGHT (CPT=73503); Future  -     XR FLUOROSCOPIC GUIDANCE NEEDLE PLACEMENT (CPT=77002); Future        Assessment: Right hip osteoarthritis and shortening.    Plan: I discussed with her and her daughter that her mind is sharp in the hip is what limits her ambulation.  I told her hip replacement is on the table and I would not withhold the surgery from her given that her mind is sharp.  She is not interested in surgery at the present.  She wants to try a cortisone injection.  We gave her a referral to the radiology group to use  imaging guidance for right hip injection of lidocaine and Kenalog.  I let her know that her glucose levels were elevated for 3 to 4 days after the cortisone.  If they go over 300, she was instructed to call Dr. Cruz.  Again I told her and her daughter to consider hip replacement but for now I will see her as needed.    Follow Up: No follow-ups on file.    Zac Abdi MD

## 2024-04-03 ENCOUNTER — ORDER TRANSCRIPTION (OUTPATIENT)
Dept: ADMINISTRATIVE | Facility: HOSPITAL | Age: 89
End: 2024-04-03

## 2024-04-03 DIAGNOSIS — M16.11 PRIMARY OSTEOARTHRITIS OF ONE HIP, RIGHT: ICD-10-CM

## 2024-04-03 DIAGNOSIS — M25.551 RIGHT HIP PAIN: Primary | ICD-10-CM

## 2024-04-11 ENCOUNTER — HOSPITAL ENCOUNTER (OUTPATIENT)
Dept: GENERAL RADIOLOGY | Facility: HOSPITAL | Age: 89
Discharge: HOME | End: 2024-04-11
Attending: ORTHOPAEDIC SURGERY
Payer: MEDICARE

## 2024-04-11 DIAGNOSIS — M16.11 PRIMARY OSTEOARTHRITIS OF ONE HIP, RIGHT: ICD-10-CM

## 2024-04-11 DIAGNOSIS — M25.551 RIGHT HIP PAIN: ICD-10-CM

## 2024-04-11 DIAGNOSIS — M16.11 PRIMARY OSTEOARTHRITIS OF RIGHT HIP: ICD-10-CM

## 2024-04-11 PROCEDURE — 20610 DRAIN/INJ JOINT/BURSA W/O US: CPT | Performed by: ORTHOPAEDIC SURGERY

## 2024-04-11 PROCEDURE — 77002 NEEDLE LOCALIZATION BY XRAY: CPT | Performed by: ORTHOPAEDIC SURGERY

## 2024-04-11 RX ORDER — LIDOCAINE HYDROCHLORIDE 10 MG/ML
INJECTION, SOLUTION EPIDURAL; INFILTRATION; INTRACAUDAL; PERINEURAL
Status: DISPENSED
Start: 2024-04-11 | End: 2024-04-11

## 2024-04-11 RX ORDER — TRIAMCINOLONE ACETONIDE 40 MG/ML
INJECTION, SUSPENSION INTRA-ARTICULAR; INTRAMUSCULAR
Status: COMPLETED
Start: 2024-04-11 | End: 2024-04-11

## 2024-04-11 RX ORDER — LIDOCAINE HYDROCHLORIDE 10 MG/ML
INJECTION, SOLUTION EPIDURAL; INFILTRATION; INTRACAUDAL; PERINEURAL
Status: COMPLETED
Start: 2024-04-11 | End: 2024-04-11

## 2024-04-11 RX ORDER — LIDOCAINE HYDROCHLORIDE 10 MG/ML
10 INJECTION, SOLUTION EPIDURAL; INFILTRATION; INTRACAUDAL; PERINEURAL ONCE
Status: COMPLETED | OUTPATIENT
Start: 2024-04-11 | End: 2024-04-11

## 2024-04-11 RX ORDER — LIDOCAINE HYDROCHLORIDE 10 MG/ML
5 INJECTION, SOLUTION EPIDURAL; INFILTRATION; INTRACAUDAL; PERINEURAL ONCE
Status: COMPLETED | OUTPATIENT
Start: 2024-04-11 | End: 2024-04-11

## 2024-04-11 RX ORDER — TRIAMCINOLONE ACETONIDE 40 MG/ML
40 INJECTION, SUSPENSION INTRA-ARTICULAR; INTRAMUSCULAR ONCE
Status: COMPLETED | OUTPATIENT
Start: 2024-04-11 | End: 2024-04-11

## 2024-04-11 RX ADMIN — TRIAMCINOLONE ACETONIDE 40 MG: 40 INJECTION, SUSPENSION INTRA-ARTICULAR; INTRAMUSCULAR at 12:30:00

## 2024-04-11 RX ADMIN — LIDOCAINE HYDROCHLORIDE 10 ML: 10 INJECTION, SOLUTION EPIDURAL; INFILTRATION; INTRACAUDAL; PERINEURAL at 12:30:00

## 2024-04-11 RX ADMIN — LIDOCAINE HYDROCHLORIDE 4 ML: 10 INJECTION, SOLUTION EPIDURAL; INFILTRATION; INTRACAUDAL; PERINEURAL at 12:30:00

## 2024-04-16 ENCOUNTER — OFFICE VISIT (OUTPATIENT)
Dept: PODIATRY CLINIC | Facility: CLINIC | Age: 89
End: 2024-04-16
Payer: MEDICARE

## 2024-04-16 DIAGNOSIS — B35.1 ONYCHOMYCOSIS: Primary | ICD-10-CM

## 2024-04-16 DIAGNOSIS — R26.81 GAIT INSTABILITY: ICD-10-CM

## 2024-04-16 DIAGNOSIS — E11.42 TYPE 2 DIABETES MELLITUS WITH POLYNEUROPATHY (HCC): ICD-10-CM

## 2024-04-16 PROCEDURE — 99213 OFFICE O/P EST LOW 20 MIN: CPT | Performed by: PODIATRIST

## 2024-04-16 NOTE — PROGRESS NOTES
[facility]iat[facility]  Progress Note    Rossy Cano is a 89 year old female.   Chief Complaint   Patient presents with    Diabetic Foot Care     4 mo f/u - last XiU7Z=0.2 from 4/9/24 - has no c/o regarding her feet - this AM her UU=363         HPI:     This is a pleasant diabetic female.  She does use a walker for ambulation. She does have numbness and tingling to her feet.      She presents to clinic today for diabetic foot care.          Allergies: Bactrim [sulfamethoxazole w/trimethoprim] and Shellfish-derived products   Current Outpatient Medications   Medication Sig Dispense Refill    Fluticasone Propionate 50 MCG/ACT Nasal Suspension 1 spray by Nasal route 2 (two) times daily. (Patient taking differently: 1 spray by Nasal route 2 (two) times daily as needed.  ) 1 Bottle 3    LUTEIN OR Take 1 tablet by mouth daily.        MetFORMIN HCl  MG Oral Tablet 24 Hr Take 1 tablet (500 mg total) by mouth 3 (three) times daily.      LANTUS SOLOSTAR 100 UNIT/ML Subcutaneous Solution Pen-injector Inject 20 Units into the skin nightly.      Insulin Syringes, Disposable, U-100 0.5 ML Does not apply Misc       FreeStyle Lancets Does not apply Misc       aspirin 81 MG Oral Chew Tab Chew 81 mg by mouth.      Glucose Blood In Vitro Strip       Insulin Lispro, Human, (HUMALOG) 100 UNIT/ML Subcutaneous Solution 3 times a day with meals-using up to 6 units three times a day      Atorvastatin Calcium (LIPITOR) 40 MG Oral Tab Take 1 tablet (40 mg total) by mouth daily.      lisinopril (PRINIVIL,ZESTRIL) 40 MG Oral Tab Take 1 tablet (40 mg total) by mouth daily.      metoprolol succinate  MG Oral Tablet 24 Hr Take 1 tablet (100 mg total) by mouth daily.      Multiple Vitamins-Minerals (MULTI FOR HER 50+) Oral Cap Take 1 tablet by mouth.          Past Medical History:    Arthritis    Diabetes (HCC)    High blood pressure    High cholesterol    Hyperlipidemia    Osteoarthritis    Schatzki's ring    Unspecified essential  hypertension    UTI (urinary tract infection)    Visual impairment      Past Surgical History:   Procedure Laterality Date    Anesth,shoulder replacement Left 16    DR LEWIS    Appendectomy      Appendectomy      Colonoscopy      D & c      Dilation/curettage,diagnostic      Egd  3/14      Family History   Problem Relation Age of Onset    Diabetes Father     Stroke Father     Diabetes Mother     Cancer Brother         liver    Cancer Other         family h/o of prostate      Social History     Socioeconomic History    Marital status:    Tobacco Use    Smoking status: Former     Current packs/day: 0.00     Types: Cigarettes     Quit date:      Years since quittin.3    Smokeless tobacco: Never   Substance and Sexual Activity    Alcohol use: Yes     Comment: occasionally    Drug use: No           REVIEW OF SYSTEMS:   Denies nausea, fever, chills  No calf pain  No other muscle or joint aches  Denies chest pain or shortness of breath.      EXAM:   There were no vitals taken for this visit.    Constitutional:   Patient in no apparent distress. Well kept. Of normal body habitus. Alert and oriented to person, place, and time.  Vascular Examination:  DP pulse is 2/4  PT pulse is NP due to edema  Capillary refill is immediate  Edema is present bilateral ankles  Integumentary Examination:   The patient's nails appear incurvated, thickened, elongated, dystrophic, discolored with subungual debris 1-5 right, 1-5  left nails.  Digital hair growth is absent  Skin is of diminished texture and decreased turgor.  Neurological Examination:  Monofilament (10-g) sensation is 4/5 to right and 4/5 to left.  Sharp/dull is present to right and is present to left.  Parasthesias present  Musculoskeletal Examination:  Muscle Strength is 4/5.          LABS & IMAGING:     Lab Results   Component Value Date     (H) 2023    BUN 17 2023    CREATSERUM 0.87 2023    BUNCREA 19.5 2023    ANIONGAP 8  01/04/2023    GFRAA 57 (L) 04/10/2022    GFRNAA 49 (L) 04/10/2022    CA 8.9 01/04/2023     01/04/2023    K 4.1 01/04/2023     01/04/2023    CO2 26.0 01/04/2023    OSMOCALC 295 01/04/2023        Lab Results   Component Value Date     (H) 01/04/2023    A1C 7.6 (H) 01/04/2023        No results found.     ASSESSMENT AND PLAN:   Diagnoses and all orders for this visit:    Onychomycosis    Type 2 diabetes mellitus with polyneuropathy (HCC)    Gait instability              Plan:     Diabetic education and instructions have been provided. We reviewed and discussed the following:    -risk categories related to pts with diabetes and foot or lower extremity complications per ADA.    -adherence to medication regimen and close monitoring or blood sugar control.   -daily monitoring/inspection of feet and shoes.   -proper management of diet and weight   -regular follow up with PCP and specialty providers as recommended   -Lower extremity complications related to DM were reviewed and stressed prevention.     Evaluated the patient. Discussed treatment options with the patient.  Discussed with patient proper care and hygiene for their feet.  Patient tolerated procedures well, without incident.    Instrumentation used includes nail nippers and electric  where appropriate.  Procedure: (78021 Debridement of toenails 6-10) Surgically debrided and mechanically reduced 6 or more toenails.Hemmorhage occurred none.Nails that were debrided appeared dystrophic and caused the patient pain in shoe gear.Nails 5 Left & 5 Right.    RTC 3 months for DFC    No follow-ups on file.    Aubrey Corcoran DPM  4/16/24

## 2024-08-29 ENCOUNTER — OFFICE VISIT (OUTPATIENT)
Dept: PODIATRY CLINIC | Facility: CLINIC | Age: 89
End: 2024-08-29

## 2024-08-29 DIAGNOSIS — R26.81 GAIT INSTABILITY: ICD-10-CM

## 2024-08-29 DIAGNOSIS — E11.42 TYPE 2 DIABETES MELLITUS WITH POLYNEUROPATHY (HCC): Primary | ICD-10-CM

## 2024-08-29 DIAGNOSIS — B35.1 ONYCHOMYCOSIS: ICD-10-CM

## 2024-08-29 PROCEDURE — 99213 OFFICE O/P EST LOW 20 MIN: CPT | Performed by: STUDENT IN AN ORGANIZED HEALTH CARE EDUCATION/TRAINING PROGRAM

## 2024-08-29 NOTE — PROGRESS NOTES
[facility] Podiatry  Progress Note      Rossy Cano is a 89 year old female.   Chief Complaint   Patient presents with    Diabetic Foot Care     Nail trim and foot check f/u- last A1c=8.2 o 4/09/2024- FBS this am= 182- was seen by Dr. Corcoran on 4/16/2024         HPI:     Patient is a pleasant 89-year-old diabetic female presents to clinic accompanied by her daughter for bilateral foot evaluation.    Allergies: Bactrim [sulfamethoxazole w/trimethoprim] and Shellfish-derived products    Current Outpatient Medications   Medication Sig Dispense Refill    Fluticasone Propionate 50 MCG/ACT Nasal Suspension 1 spray by Nasal route 2 (two) times daily. (Patient taking differently: 1 spray by Nasal route 2 (two) times daily as needed.  ) 1 Bottle 3    LUTEIN OR Take 1 tablet by mouth daily.        MetFORMIN HCl  MG Oral Tablet 24 Hr Take 1 tablet (500 mg total) by mouth 3 (three) times daily.      LANTUS SOLOSTAR 100 UNIT/ML Subcutaneous Solution Pen-injector Inject 20 Units into the skin nightly.      Insulin Syringes, Disposable, U-100 0.5 ML Does not apply Misc       FreeStyle Lancets Does not apply Misc       aspirin 81 MG Oral Chew Tab Chew 81 mg by mouth.      Glucose Blood In Vitro Strip       Insulin Lispro, Human, (HUMALOG) 100 UNIT/ML Subcutaneous Solution 3 times a day with meals-using up to 6 units three times a day      Atorvastatin Calcium (LIPITOR) 40 MG Oral Tab Take 1 tablet (40 mg total) by mouth daily.      lisinopril (PRINIVIL,ZESTRIL) 40 MG Oral Tab Take 1 tablet (40 mg total) by mouth daily.      metoprolol succinate  MG Oral Tablet 24 Hr Take 1 tablet (100 mg total) by mouth daily.      Multiple Vitamins-Minerals (MULTI FOR HER 50+) Oral Cap Take 1 tablet by mouth.          Past Medical History:    Arthritis    Diabetes (HCC)    High blood pressure    High cholesterol    Hyperlipidemia    Osteoarthritis    Schatzki's ring    Unspecified essential hypertension    UTI (urinary tract  infection)    Visual impairment      Past Surgical History:   Procedure Laterality Date    Anesth,shoulder replacement Left 16    DR LEWIS    Appendectomy      Appendectomy      Colonoscopy      D & c      Dilation/curettage,diagnostic      Egd  3/14      Family History   Problem Relation Age of Onset    Diabetes Father     Stroke Father     Diabetes Mother     Cancer Brother         liver    Cancer Other         family h/o of prostate      Social History     Socioeconomic History    Marital status:    Tobacco Use    Smoking status: Former     Current packs/day: 0.00     Types: Cigarettes     Quit date:      Years since quittin.6    Smokeless tobacco: Never   Substance and Sexual Activity    Alcohol use: Yes     Comment: occasionally    Drug use: No           REVIEW OF SYSTEMS:     Denies nause, fever, chills  No calf pain  Denies chest pain or SOB      EXAM:   There were no vitals taken for this visit.  GENERAL: well developed, well nourished, in no apparent distress  EXTREMITIES:   1. Integument: Normal skin temperature and turgor. Toenails x10 are elongated, thickened and discolored with subungal derbi.     2. Vascular: Dorsalis pedis two out of four bilateral and posterior tibial pulses two out of   four bilateral, capillary refill normal.   3. Musculoskeletal: All muscle groups are graded 5 out of 5 in the foot and ankle.   4. Neurological: Normal sharp dull sensation; reflexes normal.             ASSESSMENT AND PLAN:   Diagnoses and all orders for this visit:    Type 2 diabetes mellitus with polyneuropathy (HCC)    Gait instability    Onychomycosis        Plan:       -Patient examined, chart history reviewed.  -Discussed importance of proper pedal hygiene, regular foot checks, and tight glucose control.  -Sharply debrided nails x10 with a sterile nail nipper achieving a 20% reduction in thickness and length, without incident.   -Ambulate with supportive shoes and inserts and avoid walking  barefoot.  -Educated patient on acute signs of infection advised patient to seek immediate medical attention if symptoms arise.    RTC in 3 months    The patient indicates understanding of these issues and agrees to the plan.        Caron Bonilla DPM

## 2024-10-01 ENCOUNTER — APPOINTMENT (OUTPATIENT)
Dept: CT IMAGING | Facility: HOSPITAL | Age: 89
End: 2024-10-01
Attending: EMERGENCY MEDICINE
Payer: MEDICARE

## 2024-10-01 ENCOUNTER — HOSPITAL ENCOUNTER (INPATIENT)
Facility: HOSPITAL | Age: 89
LOS: 3 days | Discharge: NURSING, REHAB OR LONG-TERM CARE | End: 2024-10-04
Attending: EMERGENCY MEDICINE | Admitting: HOSPITALIST
Payer: MEDICARE

## 2024-10-01 DIAGNOSIS — J69.0 ASPIRATION PNEUMONITIS (HCC): ICD-10-CM

## 2024-10-01 DIAGNOSIS — R55 SYNCOPE AND COLLAPSE: ICD-10-CM

## 2024-10-01 DIAGNOSIS — R11.2 NAUSEA AND VOMITING IN ADULT: ICD-10-CM

## 2024-10-01 DIAGNOSIS — J96.01 ACUTE HYPOXEMIC RESPIRATORY FAILURE (HCC): Primary | ICD-10-CM

## 2024-10-01 PROBLEM — R09.02 HYPOXEMIA: Status: ACTIVE | Noted: 2024-10-01

## 2024-10-01 LAB
ALBUMIN SERPL-MCNC: 4 G/DL (ref 3.2–4.8)
ALBUMIN/GLOB SERPL: 1.7 {RATIO} (ref 1–2)
ALP LIVER SERPL-CCNC: 58 U/L
ALT SERPL-CCNC: 15 U/L
ANION GAP SERPL CALC-SCNC: 7 MMOL/L (ref 0–18)
AST SERPL-CCNC: 27 U/L (ref ?–34)
BASOPHILS # BLD AUTO: 0.03 X10(3) UL (ref 0–0.2)
BASOPHILS NFR BLD AUTO: 0.3 %
BILIRUB SERPL-MCNC: 0.9 MG/DL (ref 0.2–0.9)
BILIRUB UR QL: NEGATIVE
BUN BLD-MCNC: 21 MG/DL (ref 9–23)
BUN/CREAT SERPL: 22.8 (ref 10–20)
CALCIUM BLD-MCNC: 8.9 MG/DL (ref 8.7–10.4)
CHLORIDE SERPL-SCNC: 106 MMOL/L (ref 98–112)
CLARITY UR: CLEAR
CO2 SERPL-SCNC: 27 MMOL/L (ref 21–32)
CREAT BLD-MCNC: 0.92 MG/DL
DEPRECATED RDW RBC AUTO: 42 FL (ref 35.1–46.3)
EGFRCR SERPLBLD CKD-EPI 2021: 59 ML/MIN/1.73M2 (ref 60–?)
EOSINOPHIL # BLD AUTO: 0.11 X10(3) UL (ref 0–0.7)
EOSINOPHIL NFR BLD AUTO: 1 %
ERYTHROCYTE [DISTWIDTH] IN BLOOD BY AUTOMATED COUNT: 13 % (ref 11–15)
GLOBULIN PLAS-MCNC: 2.4 G/DL (ref 2–3.5)
GLUCOSE BLD-MCNC: 194 MG/DL (ref 70–99)
GLUCOSE BLDC GLUCOMTR-MCNC: 176 MG/DL (ref 70–99)
GLUCOSE BLDC GLUCOMTR-MCNC: 248 MG/DL (ref 70–99)
GLUCOSE UR-MCNC: 1000 MG/DL
HCT VFR BLD AUTO: 45.1 %
HGB BLD-MCNC: 15 G/DL
HGB UR QL STRIP.AUTO: NEGATIVE
IMM GRANULOCYTES # BLD AUTO: 0.05 X10(3) UL (ref 0–1)
IMM GRANULOCYTES NFR BLD: 0.5 %
KETONES UR-MCNC: 10 MG/DL
LACTATE SERPL-SCNC: 1.8 MMOL/L (ref 0.5–2)
LEUKOCYTE ESTERASE UR QL STRIP.AUTO: NEGATIVE
LIPASE SERPL-CCNC: 35 U/L (ref 12–53)
LYMPHOCYTES # BLD AUTO: 0.51 X10(3) UL (ref 1–4)
LYMPHOCYTES NFR BLD AUTO: 4.8 %
MCH RBC QN AUTO: 29.1 PG (ref 26–34)
MCHC RBC AUTO-ENTMCNC: 33.3 G/DL (ref 31–37)
MCV RBC AUTO: 87.6 FL
MONOCYTES # BLD AUTO: 0.53 X10(3) UL (ref 0.1–1)
MONOCYTES NFR BLD AUTO: 5 %
NEUTROPHILS # BLD AUTO: 9.44 X10 (3) UL (ref 1.5–7.7)
NEUTROPHILS # BLD AUTO: 9.44 X10(3) UL (ref 1.5–7.7)
NEUTROPHILS NFR BLD AUTO: 88.4 %
NITRITE UR QL STRIP.AUTO: NEGATIVE
NT-PROBNP SERPL-MCNC: 290 PG/ML (ref ?–450)
OSMOLALITY SERPL CALC.SUM OF ELEC: 298 MOSM/KG (ref 275–295)
PH UR: 5.5 [PH] (ref 5–8)
PLATELET # BLD AUTO: 172 10(3)UL (ref 150–450)
POTASSIUM SERPL-SCNC: 4.8 MMOL/L (ref 3.5–5.1)
PROT SERPL-MCNC: 6.4 G/DL (ref 5.7–8.2)
PROT UR-MCNC: NEGATIVE MG/DL
RBC # BLD AUTO: 5.15 X10(6)UL
SODIUM SERPL-SCNC: 140 MMOL/L (ref 136–145)
SP GR UR STRIP: >1.03 (ref 1–1.03)
TROPONIN I SERPL HS-MCNC: 3 NG/L
TROPONIN I SERPL HS-MCNC: 4 NG/L
UROBILINOGEN UR STRIP-ACNC: NORMAL
WBC # BLD AUTO: 10.7 X10(3) UL (ref 4–11)

## 2024-10-01 PROCEDURE — 72125 CT NECK SPINE W/O DYE: CPT | Performed by: EMERGENCY MEDICINE

## 2024-10-01 PROCEDURE — 99223 1ST HOSP IP/OBS HIGH 75: CPT | Performed by: HOSPITALIST

## 2024-10-01 PROCEDURE — 70450 CT HEAD/BRAIN W/O DYE: CPT | Performed by: EMERGENCY MEDICINE

## 2024-10-01 PROCEDURE — 74177 CT ABD & PELVIS W/CONTRAST: CPT | Performed by: EMERGENCY MEDICINE

## 2024-10-01 PROCEDURE — 71260 CT THORAX DX C+: CPT | Performed by: EMERGENCY MEDICINE

## 2024-10-01 RX ORDER — DEXTROSE MONOHYDRATE 25 G/50ML
50 INJECTION, SOLUTION INTRAVENOUS
Status: DISCONTINUED | OUTPATIENT
Start: 2024-10-01 | End: 2024-10-04

## 2024-10-01 RX ORDER — AMLODIPINE BESYLATE 5 MG/1
5 TABLET ORAL DAILY
COMMUNITY

## 2024-10-01 RX ORDER — ONDANSETRON 2 MG/ML
4 INJECTION INTRAMUSCULAR; INTRAVENOUS ONCE
Status: COMPLETED | OUTPATIENT
Start: 2024-10-01 | End: 2024-10-01

## 2024-10-01 RX ORDER — SODIUM CHLORIDE 9 MG/ML
INJECTION, SOLUTION INTRAVENOUS CONTINUOUS
Status: DISCONTINUED | OUTPATIENT
Start: 2024-10-01 | End: 2024-10-04

## 2024-10-01 RX ORDER — GABAPENTIN 100 MG/1
100 CAPSULE ORAL 3 TIMES DAILY
COMMUNITY

## 2024-10-01 RX ORDER — HEPARIN SODIUM 5000 [USP'U]/ML
5000 INJECTION, SOLUTION INTRAVENOUS; SUBCUTANEOUS EVERY 12 HOURS SCHEDULED
Status: DISCONTINUED | OUTPATIENT
Start: 2024-10-01 | End: 2024-10-04

## 2024-10-01 RX ORDER — DORZOLAMIDE HCL 20 MG/ML
1 SOLUTION/ DROPS OPHTHALMIC 4 TIMES DAILY
COMMUNITY
End: 2024-10-04

## 2024-10-01 RX ORDER — NICOTINE POLACRILEX 4 MG
30 LOZENGE BUCCAL
Status: DISCONTINUED | OUTPATIENT
Start: 2024-10-01 | End: 2024-10-04

## 2024-10-01 RX ORDER — HYDROCHLOROTHIAZIDE 12.5 MG/1
12.5 TABLET ORAL
COMMUNITY

## 2024-10-01 RX ORDER — METOCLOPRAMIDE HYDROCHLORIDE 5 MG/ML
5 INJECTION INTRAMUSCULAR; INTRAVENOUS EVERY 8 HOURS PRN
Status: DISCONTINUED | OUTPATIENT
Start: 2024-10-01 | End: 2024-10-04

## 2024-10-01 RX ORDER — ACETAMINOPHEN 500 MG
500 TABLET ORAL EVERY 4 HOURS PRN
Status: DISCONTINUED | OUTPATIENT
Start: 2024-10-01 | End: 2024-10-04

## 2024-10-01 RX ORDER — NICOTINE POLACRILEX 4 MG
15 LOZENGE BUCCAL
Status: DISCONTINUED | OUTPATIENT
Start: 2024-10-01 | End: 2024-10-04

## 2024-10-01 RX ORDER — ONDANSETRON 2 MG/ML
INJECTION INTRAMUSCULAR; INTRAVENOUS
Status: COMPLETED
Start: 2024-10-01 | End: 2024-10-01

## 2024-10-01 RX ORDER — DIFLUPREDNATE OPHTHALMIC 0.5 MG/ML
1 EMULSION OPHTHALMIC 4 TIMES DAILY
COMMUNITY
End: 2024-10-04

## 2024-10-01 RX ORDER — LATANOPROST 50 UG/ML
SOLUTION/ DROPS OPHTHALMIC NIGHTLY
COMMUNITY

## 2024-10-01 RX ORDER — ONDANSETRON 2 MG/ML
4 INJECTION INTRAMUSCULAR; INTRAVENOUS EVERY 6 HOURS PRN
Status: DISCONTINUED | OUTPATIENT
Start: 2024-10-01 | End: 2024-10-04

## 2024-10-01 NOTE — ED PROVIDER NOTES
Patient Seen in: [facility] Emergency Department    History     Chief Complaint   Patient presents with    Trauma     Stated Complaint: Fall     HPI    89 yo F with PMH DM, HTN, HL presenting via EMS For evaluation s/p unwitnessed fall with patient reporting syncope while preparing to go to bed. Currently without overt complains though eventually noting abdominal pain in setting of NB/NB emesis x5 upon ED arrival, thereafter noted to drop Sp02 from 90s to mid /low 80s.  No preceding or current chest pain/shortness of breath.  No diarrhea or urinary complaints.    Past Medical History:    Arthritis    Diabetes (HCC)    High blood pressure    High cholesterol    Hyperlipidemia    Osteoarthritis    Schatzki's ring    Unspecified essential hypertension    UTI (urinary tract infection)    Visual impairment       Past Surgical History:   Procedure Laterality Date    Anesth,shoulder replacement Left 16    DR LEWIS    Appendectomy      Appendectomy      Colonoscopy      D & c      Dilation/curettage,diagnostic      Egd  3/14            Family History   Problem Relation Age of Onset    Diabetes Father     Stroke Father     Diabetes Mother     Cancer Brother         liver    Cancer Other         family h/o of prostate       Social History     Socioeconomic History    Marital status:    Tobacco Use    Smoking status: Former     Current packs/day: 0.00     Types: Cigarettes     Quit date:      Years since quittin.7    Smokeless tobacco: Never   Substance and Sexual Activity    Alcohol use: Not Currently     Comment: occasionally    Drug use: No     Social Determinants of Health     Financial Resource Strain: Low Risk  (10/1/2024)    Received from [facility]    Overall Financial Resource Strain (CARDIA)     Difficulty of Paying Living Expenses: Not hard at all   Food Insecurity: No Food Insecurity (10/1/2024)    Received from [facility]    Hunger Vital Sign      Worried About Running Out of Food in the Last Year: Never true     Ran Out of Food in the Last Year: Never true   Transportation Needs: No Transportation Needs (10/1/2024)    Received from [facility]    PRAPARE - Transportation     Lack of Transportation (Medical): No     Lack of Transportation (Non-Medical): No   Housing Stability: Unknown (10/1/2024)    Received from [facility]    Housing Stability Vital Sign     Unable to Pay for Housing in the Last Year: No     Homeless in the Last Year: No       Review of Systems :  Constitutional: As per HPI  Respiratory: Negative for cough and shortness of breath.    Cardiovascular: Negative for chest pain and palpitations.   Gastrointestinal: (+) abd pain, nausea/vomiting.    Positive for stated complaint: Fall  Other systems are as noted in HPI.  Constitutional and vital signs reviewed.      All other systems reviewed and negative except as noted above.    PSFH elements reviewed from today and agreed except as otherwise stated in HPI.    Physical Exam     ED Triage Vitals [10/01/24 1801]   /76   Pulse 84   Resp 20   Temp    Temp src    SpO2 93 %   O2 Device None (Room air)       Current:/76   Pulse 84   Resp 20   Ht 170.2 cm (5' 7\")   Wt 81.6 kg   SpO2 93%   BMI 28.19 kg/m²         Physical Exam   Constitutional: No distress.   HEENT: MMM.  Head: Normocephalic.   Eyes: No injection.  Pupils midrange and equally reactive.  Full/painless extraocular movements.  Neck: Neck supple.  No midline C-spine tenderness/step-off/deformity.  Cardiovascular: RRR.   Pulmonary/Chest: Effort normal. CTAB.  Abdominal: Soft.  Mild epigastric tenderness without peritonitis.  Musculoskeletal: No gross deformity.  Neurological: Alert.  Cranial nerves II through XII grossly intact. Bilateral upper and lower extremities with 5/5 strength proximally and distally.  Skin: Skin is warm.   Psychiatric: Cooperative.  Nursing note and  vitals reviewed.        ED Course     Labs Reviewed   CBC WITH DIFFERENTIAL WITH PLATELET   COMP METABOLIC PANEL (14)   TROPONIN I HIGH SENSITIVITY   URINALYSIS, ROUTINE     EKG    Rate, intervals and axes as noted on EKG Report.  Rate: 85  Rhythm: Sinus Rhythm  Reading: NSR 85 with bifascicular block without overt SABA, unchanged from 1/4/2023 as independently interpreted by myself       EKG (2hr)    Rate, intervals and axes as noted on EKG Report.  Rate: 80   Rhythm: NSR   Reading: NSR 80 with bifasicular block without overt SABA as independently interpreted by myself               CT CHEST+ABDOMEN+PELVIS(ALL CNTRST ONLY)(CPT=71260/10598)    Result Date: 10/1/2024  PROCEDURE: CT CHEST ABDOMEN PELVIS (ALL CONTRAST ONLY) (CPT=71260/10791)  COMPARISON: [facility], CT UROGRAM (W+WO) SH (CPT=74178), 6/18/2020, 1:22 PM.  [facility], CT CHEST WO CONTRAST, 3/17/2015, 1:49 PM.  [facility], CT CHEST WO CONTRAST, 4/01/2014, 1:13 PM.  INDICATIONS: Unwitnessed fall, nausea, vomiting.  TECHNIQUE:   CT images of the chest, abdomen and pelvis were obtained with intravenous contrast material.  Automated exposure control for dose reduction was used. Adjustment of the mA and/or kV was done based on the patient's size. Use of iterative reconstruction technique for dose reduction was used.  Dose information is transmitted to the ACR (American College of Radiology) NRDR (National Radiology Data Registry) which includes the Dose Index Registry.  FINDINGS:  Limited examination due to artifact from arms down scanning technique, as well as due to patient motion artifact.   CARDIAC: Coronary artery calcifications. No enlargement. MEDIASTINUM/AYE: No mass or enlarged adenopathy.   LUNGS: Trachea and central bronchi are clear.  Mild dependent opacities at the lung bases, thought to represent subsegmental atelectasis.  New line there is interlobular septal thickening of the upper lungs  bilaterally. Mild respiratory motion artifact.  A few scattered micronodules are unchanged in the periphery of the right upper lobe.  Minimal thickening of the right minor fissure is unchanged.   THORACIC AORTA: Mild motion artifact.  Normal size for age.  No aneurysm.  PLEURA:   No mass or effusion.  CHEST WALL: No axillary mass or enlarged adenopathy.  BONES:   There is multilevel degenerative disease of the spine.  There is diffuse osseous demineralization, which limits sensitivity for detection of osseous pathology.  Scoliosis.  Streak artifact from partially imaged left shoulder arthroplasty.  Chronic remodeling changes of the left glenoid.  Severe right shoulder glenohumeral osteoarthritis with associated joint effusion. OTHER: Several small subcentimeter nodules in the left lobe of the thyroid.    LIVER: Unremarkable  GALLBLADDER: Present  BILIARY: No ductal dilation.  SPLEEN: Not enlarged.  PANCREAS:  Low-density lesions of the pancreas are again noted.  No new main pancreatic ductal dilation.  Small low-density lesions of the pancreas again noted (such is 9 mm inferior incident process lesion on series 2, image 100).  The  ADRENALS: Unremarkable.  KIDNEYS: No hydronephrosis. No obstructing calculi. Subcentimeter low-density foci are too small to accurately characterize.   BOWEL:   No obstruction. Portions of the bowel are under distended and not well assessed.  Liquid stool in right colon.  Colonic diverticula present.  Scattered small bowel loops which are nondilated and demonstrate internal fluid. Extensive colonic diverticula. Stomach is moderately distended with fluid. AORTA/VASCULAR:   There is no abdominal aortic aneurysm.  Calcific atherosclerosis  RETROPERITONEUM/ MESENTERY:  A 2.5 x 1.4 cm jorge hepatis lesion, probably a lymph node, probably similar to prior when accounting for technical differences (2/85).  2.0 x 2.1 cm gastrohepatic ligament lymph node (2/80).  Otherwise, no enlarged adenopathy.  No free fluid or free air.  URINARY BLADDER:  Completely collapsed and cannot be accurately assessed.  PELVIC NODES: No enlarged adenopathy.    PELVIC ORGANS: Pelvic organs appropriate for patient age.   BONES:   No suspicious osseous finding.  There is multilevel degenerative disease of the spine.  There is diffuse osseous demineralization, which limits sensitivity for detection of osseous pathology.  Severe right hip osteoarthritis.  Mild right sacroiliac and pubic symphysis joint osteoarthritis.  ABDOMINAL WALL:  Mild bilateral anterior subcutaneous fat stranding, could be posttraumatic or related to previous injections.  Small fat containing left inguinal hernia.         CONCLUSION:   Imaging findings which can represent enterocolitis in the appropriate clinical setting.  Moderate amount of fluid within the stomach can be secondary to gastritis or related to recent vomiting.  Nonspecific 2.5 cm jorge hepatis and 2.0 cm gastrohepatic ligament lymph nodes.  Could be reactive related to the aforementioned process.  Other etiologies not excluded.  No evidence of traumatic injury in the chest, abdomen, or pelvis.  Interlobular septal thickening bilaterally within the lungs, can represent atypical/viral pneumonia or interstitial pulmonary edema.  Small cystic pancreatic lesions again noted, measuring up to 0.9 cm.  If clinically relevant in a patient of this age, further assessment can be made with contrast enhanced abdominal MRI/MRCP  Colonic diverticulosis.     Dictated by (CST): Sheri Moise MD on 10/01/2024 at 6:38 PM     Finalized by (CST): Sheri Moise MD on 10/01/2024 at 6:55 PM           CT SPINE CERVICAL (CPT=72125)    Result Date: 10/1/2024  PROCEDURE: CT SPINE CERVICAL (CPT=72125)  COMPARISON: None.  INDICATIONS: Trauma, neck pain  TECHNIQUE:   Multi-planar CT images were obtained without intravenous contrast material.  Automated exposure control for dose reduction was used. Adjustment of the mA  and/or kV was done based on the patient's size. Use of iterative reconstruction technique for dose reduction was used.  Dose information is transmitted to the ACR (American College of Radiology) NRDR (National Radiology Data Registry) which includes the Dose Index Registry.   FINDINGS:  CRANIOCERVICAL AREA:   Normal foramen magnum with no Chiari malformation. PARASPINAL AREA: Normal with no visible mass.  BONES: No acute fracture or subluxation.  Degeneration between the anterior arch of C1 and the dens.  Multilevel moderate to advanced disc and facet degeneration.  Minimal degenerative anterolisthesis of C3 on C4, C4 on C5, and C7 on T1.  Mild central narrowing at C3-4, mild to moderate central narrowing at C4-5, and C5-6.  Moderate bilateral C5, C6, and C7 foraminal narrowing.  Moderate right C4 foraminal narrowing.   OTHER:   Atherosclerotic vascular calcification.          CONCLUSION:  1. No acute fracture or subluxation.    Dictated by (CST): Paul Henry MD on 10/01/2024 at 6:30 PM     Finalized by (CST): Paul Henry MD on 10/01/2024 at 6:34 PM          CT BRAIN OR HEAD (CPT=70450)    Result Date: 10/1/2024  PROCEDURE: CT BRAIN OR HEAD (CPT=70450)  COMPARISON: None.  INDICATIONS: Loss of balance resulting in a fall.  Unwitnessed fall, nausea, vomiting.  TECHNIQUE: CT images were obtained without contrast material.  Automated exposure control for dose reduction was used.  Dose information is transmitted to the ACR (American College of Radiology) NRDR (National Radiology Data Registry) which includes the Dose Index Registry.  FINDINGS:  CSF SPACES: No hydrocephalus, subarachnoid hemorrhage, or mass.  No midline shift.  CEREBRUM: No edema, hemorrhage, mass or acute infarct. WHITE MATTER: Decreased attenuation in cerebral white matter bilaterally. CEREBELLUM: No edema, hemorrhage, mass or acute infarct. BRAINSTEM: No edema, hemorrhage, mass or acute infarct. CALVARIUM: Skull base and calvarium intact.  SINUSES: Left maxillary sinus retention cyst.  No acute sinusitis or mastoiditis. ORBITS: Globes intact.  OTHER: Atherosclerotic calcification cavernous carotid arteries.         CONCLUSION:  1. No acute intracranial finding. 2. Mild changes of chronic small vessel disease in cerebral white matter.    Dictated by (CST): Paul Hnery MD on 10/01/2024 at 6:28 PM     Finalized by (CST): Paul Henry MD on 10/01/2024 at 6:30 PM                MDM   DIFFERENTIAL DIAGNOSIS: After history and physical exam differential diagnosis includes but is not limited to ACS, carditis, valvulopathy, intracerebral hemorrhage/contusion, perforated viscus, pancreatitis, hepatobiliary disease, aspiration pneumonitis, pneumothorax, pulmonary contusion.    Pulse ox: 93%:Normal on RA, as independently interpreted by myself    Cardiac Monitor Interpretation:   Pulse Readings from Last 1 Encounters:   10/01/24 84   , sinus,    Medical Decision Making  Evaluation for unwitnessed fall with patient describing syncope associated with abdominal pain upon ED arrival and with multiple episodes of nonbloody/nonbilious emesis thereafter with patient noted to be hypoxemic requiring nasal cannula and with clinical concern for aspiration; CT imaging as noted with empiric Unasyn initiated given concern for aspiration, will admit for ongoing monitoring/management.    Problems Addressed:  Acute hypoxemic respiratory failure (HCC): acute illness or injury  Aspiration pneumonitis (HCC): complicated acute illness or injury  Nausea and vomiting in adult: complicated acute illness or injury  Syncope and collapse: acute illness or injury    Amount and/or Complexity of Data Reviewed  External Data Reviewed: labs and ECG.     Details: 1/2023 EKG, 11/2023 labs reviewed  Labs: ordered. Decision-making details documented in ED Course.  Radiology: independent interpretation performed. Decision-making details documented in ED Course.     Details: CTH without obvious  ICH as independently interpreted by myself  CT abd/pelvis without obvious free air as independently interpreted by myself    ECG/medicine tests: ordered and independent interpretation performed. Decision-making details documented in ED Course.  Discussion of management or test interpretation with external provider(s): Case d/w Dr. Grewal for admission    Risk  Prescription drug management.  Decision regarding hospitalization.        I was wearing at minimum a facemask and eye protection throughout this encounter with handwashing performed prior and after patient evaluation without personal hand/facial/oropharyngeal contact and gloves worn throughout encounter. See note and/or contact this provider for further PPE details.    Disposition and Plan     Clinical Impression:  1. Acute hypoxemic respiratory failure (HCC)    2. Syncope and collapse    3. Nausea and vomiting in adult    4. Aspiration pneumonitis (HCC)        Disposition:  Admit    Follow-up:  No follow-up provider specified.    Medications Prescribed:  Current Discharge Medication List

## 2024-10-02 ENCOUNTER — APPOINTMENT (OUTPATIENT)
Dept: CV DIAGNOSTICS | Facility: HOSPITAL | Age: 89
End: 2024-10-02
Attending: HOSPITALIST
Payer: MEDICARE

## 2024-10-02 ENCOUNTER — APPOINTMENT (OUTPATIENT)
Dept: ULTRASOUND IMAGING | Facility: HOSPITAL | Age: 89
End: 2024-10-02
Attending: HOSPITALIST
Payer: MEDICARE

## 2024-10-02 LAB
ANION GAP SERPL CALC-SCNC: 8 MMOL/L (ref 0–18)
ATRIAL RATE: 80 BPM
ATRIAL RATE: 85 BPM
BASOPHILS # BLD AUTO: 0.02 X10(3) UL (ref 0–0.2)
BASOPHILS NFR BLD AUTO: 0.4 %
BUN BLD-MCNC: 15 MG/DL (ref 9–23)
CALCIUM BLD-MCNC: 8.6 MG/DL (ref 8.7–10.4)
CHLORIDE SERPL-SCNC: 106 MMOL/L (ref 98–112)
CO2 SERPL-SCNC: 24 MMOL/L (ref 21–32)
CREAT BLD-MCNC: 0.91 MG/DL
DEPRECATED RDW RBC AUTO: 42.5 FL (ref 35.1–46.3)
EGFRCR SERPLBLD CKD-EPI 2021: 60 ML/MIN/1.73M2 (ref 60–?)
EOSINOPHIL # BLD AUTO: 0.09 X10(3) UL (ref 0–0.7)
EOSINOPHIL NFR BLD AUTO: 1.7 %
ERYTHROCYTE [DISTWIDTH] IN BLOOD BY AUTOMATED COUNT: 13.1 % (ref 11–15)
EST. AVERAGE GLUCOSE BLD GHB EST-MCNC: 200 MG/DL (ref 68–126)
GLUCOSE BLD-MCNC: 341 MG/DL (ref 70–99)
GLUCOSE BLDC GLUCOMTR-MCNC: 173 MG/DL (ref 70–99)
GLUCOSE BLDC GLUCOMTR-MCNC: 227 MG/DL (ref 70–99)
GLUCOSE BLDC GLUCOMTR-MCNC: 227 MG/DL (ref 70–99)
GLUCOSE BLDC GLUCOMTR-MCNC: 266 MG/DL (ref 70–99)
GLUCOSE BLDC GLUCOMTR-MCNC: 287 MG/DL (ref 70–99)
HBA1C MFR BLD: 8.6 % (ref ?–5.7)
HCT VFR BLD AUTO: 42.2 %
HGB BLD-MCNC: 14.2 G/DL
IMM GRANULOCYTES # BLD AUTO: 0.01 X10(3) UL (ref 0–1)
IMM GRANULOCYTES NFR BLD: 0.2 %
LYMPHOCYTES # BLD AUTO: 0.42 X10(3) UL (ref 1–4)
LYMPHOCYTES NFR BLD AUTO: 7.9 %
MCH RBC QN AUTO: 29.8 PG (ref 26–34)
MCHC RBC AUTO-ENTMCNC: 33.6 G/DL (ref 31–37)
MCV RBC AUTO: 88.7 FL
MONOCYTES # BLD AUTO: 0.33 X10(3) UL (ref 0.1–1)
MONOCYTES NFR BLD AUTO: 6.2 %
NEUTROPHILS # BLD AUTO: 4.48 X10 (3) UL (ref 1.5–7.7)
NEUTROPHILS # BLD AUTO: 4.48 X10(3) UL (ref 1.5–7.7)
NEUTROPHILS NFR BLD AUTO: 83.6 %
P AXIS: 19 DEGREES
P AXIS: 29 DEGREES
P-R INTERVAL: 138 MS
P-R INTERVAL: 148 MS
PLATELET # BLD AUTO: 166 10(3)UL (ref 150–450)
POTASSIUM SERPL-SCNC: 4.2 MMOL/L (ref 3.5–5.1)
Q-T INTERVAL: 412 MS
Q-T INTERVAL: 412 MS
QRS DURATION: 118 MS
QRS DURATION: 128 MS
QTC CALCULATION (BEZET): 475 MS
QTC CALCULATION (BEZET): 490 MS
R AXIS: -71 DEGREES
R AXIS: -80 DEGREES
RBC # BLD AUTO: 4.76 X10(6)UL
SODIUM SERPL-SCNC: 138 MMOL/L (ref 136–145)
T AXIS: 23 DEGREES
T AXIS: 39 DEGREES
TSI SER-ACNC: 1.17 MIU/ML (ref 0.55–4.78)
VENTRICULAR RATE: 80 BPM
VENTRICULAR RATE: 85 BPM
WBC # BLD AUTO: 5.4 X10(3) UL (ref 4–11)

## 2024-10-02 PROCEDURE — 99233 SBSQ HOSP IP/OBS HIGH 50: CPT | Performed by: STUDENT IN AN ORGANIZED HEALTH CARE EDUCATION/TRAINING PROGRAM

## 2024-10-02 PROCEDURE — 95816 EEG AWAKE AND DROWSY: CPT | Performed by: OTHER

## 2024-10-02 PROCEDURE — 93880 EXTRACRANIAL BILAT STUDY: CPT | Performed by: HOSPITALIST

## 2024-10-02 RX ORDER — GABAPENTIN 100 MG/1
100 CAPSULE ORAL 3 TIMES DAILY
Status: DISCONTINUED | OUTPATIENT
Start: 2024-10-02 | End: 2024-10-04

## 2024-10-02 RX ORDER — INSULIN DEGLUDEC 100 U/ML
18 INJECTION, SOLUTION SUBCUTANEOUS NIGHTLY
Status: DISCONTINUED | OUTPATIENT
Start: 2024-10-02 | End: 2024-10-04

## 2024-10-02 RX ORDER — ATORVASTATIN CALCIUM 40 MG/1
40 TABLET, FILM COATED ORAL DAILY
Status: DISCONTINUED | OUTPATIENT
Start: 2024-10-02 | End: 2024-10-04

## 2024-10-02 RX ORDER — DIFLUPREDNATE OPHTHALMIC 0.5 MG/ML
1 EMULSION OPHTHALMIC 4 TIMES DAILY
Status: DISCONTINUED | OUTPATIENT
Start: 2024-10-02 | End: 2024-10-03

## 2024-10-02 RX ORDER — DORZOLAMIDE HCL 20 MG/ML
1 SOLUTION/ DROPS OPHTHALMIC 4 TIMES DAILY
Status: DISCONTINUED | OUTPATIENT
Start: 2024-10-02 | End: 2024-10-04

## 2024-10-02 NOTE — PLAN OF CARE
Pt is A&Ox4. Pt was transferred to PMU from the ED for SOB and fall at home. No complaints of pain. Monitor via remote tele. On 1L of 02, room air is baseline.  CT and chest xray normal. Pt on IVF and antibiotics. Safety precaution in place and call light within her reach.     Problem: Patient Centered Care  Goal: Patient preferences are identified and integrated in the patient's plan of care  Description: Interventions:  - What would you like us to know as we care for you? I live with my adult grandson.   - Provide timely, complete, and accurate information to patient/family  - Incorporate patient and family knowledge, values, beliefs, and cultural backgrounds into the planning and delivery of care  - Encourage patient/family to participate in care and decision-making at the level they choose  - Honor patient and family perspectives and choices  Outcome: Progressing     Problem: RESPIRATORY - ADULT  Goal: Achieves optimal ventilation and oxygenation  Description: INTERVENTIONS:  - Assess for changes in respiratory status  - Assess for changes in mentation and behavior  - Position to facilitate oxygenation and minimize respiratory effort  - Oxygen supplementation based on oxygen saturation or ABGs  - Provide Smoking Cessation handout, if applicable  - Encourage broncho-pulmonary hygiene including cough, deep breathe, Incentive Spirometry  - Assess the need for suctioning and perform as needed  - Assess and instruct to report SOB or any respiratory difficulty  - Respiratory Therapy support as indicated  - Manage/alleviate anxiety  - Monitor for signs/symptoms of CO2 retention  Outcome: Progressing     Problem: MUSCULOSKELETAL - ADULT  Goal: Return mobility to safest level of function  Description: INTERVENTIONS:  - Assess patient stability and activity tolerance for standing, transferring and ambulating w/ or w/o assistive devices  - Assist with transfers and ambulation using safe patient handling equipment as  needed  - Ensure adequate protection for wounds/incisions during mobilization  - Obtain PT/OT consults as needed  - Advance activity as appropriate  - Communicate ordered activity level and limitations with patient/family  Outcome: Progressing  Goal: Maintain proper alignment of affected body part  Description: INTERVENTIONS:  - Support and protect limb and body alignment per provider's orders  - Instruct and reinforce with patient and family use of appropriate assistive device and precautions (e.g. spinal or hip dislocation precautions)  Outcome: Progressing

## 2024-10-02 NOTE — SLP NOTE
ADULT SWALLOWING EVALUATION    ASSESSMENT    ASSESSMENT/OVERALL IMPRESSION:  PPE REQUIRED. THIS THERAPIST WORE GLOVES, DROPLET MASK, AND GOGGLES FOR DURATION OF EVALUATION. HANDS WASHED UPON ENTRANCE/EXIT.    SLP BSSE orders received and acknowledged. A swallow evaluation warranted as pt at risk for aspiration. Pt afebrile with clear vocal quality, on room air, with oxygen saturation at 93. Pt with prior hx of dysphagia at [facility] in which last recommended diet was regular/thin per VFSS in 4/2020. Pt positioned upright in bedside chair. Pt with no complaints of pain, RN aware. Pt with adequate oral acceptance and bilabial seal across all trials. Pt with intact bite, mildly prolonged mastication of solids, and timely A-P transit. Pt's swallow response appears timely with slightly reduced hyolaryngeal elevation/excursion. No clinical signs of aspiration (e.g., immediate/delayed throat clear, immediate/delayed cough, wet vocal quality, increased O2 effort) observed across all trials of puree, hard solids, thin liquids via open cup. Pt with desaturation to 90% while using a straw. Oral/buccal cavities clear of residue following all trials. Oxygen status remained >90 t/o the entire evaluation.     At this time, pt presents with mild oral dysphagia and probable pharyngeal dysfunction. Recommend a regular diet and thin liquids with strict adherence to safe swallowing compensatory strategies. Results and recommendations reviewed with RN and pt. Pt v/u to all results/recommendations. Recommendations remain written on whiteboard. SLP collaborated with RN for MD diet orders.     PLAN: SLP to f/u x1 meal assessment, monitor CXR, and VFSS if clinically warranted.     RECOMMENDATIONS   Diet Recommendations - Solids: Regular  Diet Recommendations - Liquids: Thin Liquids      Compensatory Strategies Recommended: No straws;Slow rate;Alternate consistencies;Small bites and sips  Aspiration Precautions: Upright position;Slow rate;Small  bites and sips;No straw  Medication Administration Recommendations: One pill at a time  Treatment Plan/Recommendations: Aspiration precautions    HISTORY   MEDICAL HISTORY  Reason for Referral: R/O aspiration    Problem List  Principal Problem:    Acute hypoxemic respiratory failure (HCC)  Active Problems:    Syncope and collapse    Nausea and vomiting in adult    Aspiration pneumonitis (HCC)    Hypoxemia      Past Medical History  Past Medical History:    Arthritis    Diabetes (HCC)    High blood pressure    High cholesterol    Hyperlipidemia    Osteoarthritis    Schatzki's ring    Unspecified essential hypertension    UTI (urinary tract infection)    Visual impairment       Prior Living Situation:  (Home with grandson)  Diet Prior to Admission: Regular;Thin liquids  Precautions: Aspiration    Patient/Family Goals: No difficulties swallowing    SWALLOWING HISTORY  Current Diet Consistency: Regular;Thin liquids  Dysphagia History:   BSE 4/24/20: regular/mildly thick  VFSS 4/25/20: regular/thin    Imaging Results:     CHEST CT 10/1/24:  CONCLUSION:      Imaging findings which can represent enterocolitis in the appropriate clinical setting.      Moderate amount of fluid within the stomach can be secondary to gastritis or related to recent vomiting.      Nonspecific 2.5 cm jorge hepatis and 2.0 cm gastrohepatic ligament lymph nodes.  Could be reactive related to the aforementioned process.  Other etiologies not excluded.      No evidence of traumatic injury in the chest, abdomen, or pelvis.      Interlobular septal thickening bilaterally within the lungs, can represent atypical/viral pneumonia or interstitial pulmonary edema.      Small cystic pancreatic lesions again noted, measuring up to 0.9 cm.  If clinically relevant in a patient of this age, further assessment can be made with contrast enhanced abdominal MRI/MRCP      Colonic diverticulosis.               Dictated by (CST): Sheri Moise MD on 10/01/2024 at  6:38 PM       Finalized by (CST): Sheri Moise MD on 10/01/2024 at 6:55 PM       CT BRAIN 10/1/24:  CONCLUSION:   1. No acute intracranial finding.   2. Mild changes of chronic small vessel disease in cerebral white matter.            Dictated by (CST): Paul Henry MD on 10/01/2024 at 6:28 PM       Finalized by (CST): Paul Henry MD on 10/01/2024 at 6:30 PM       OBJECTIVE   ORAL MOTOR EXAMINATION  Dentition: Natural;Functional  Symmetry: Within Functional Limits  Strength:  (slightly reduced)     Range of Motion: Within Functional Limits  Rate of Motion: Reduced    Voice Quality: Clear  Respiratory Status: Unlabored  Consistencies Trialed: Thin liquids;Puree;Hard solid  Method of Presentation: Self presentation;Spoon;Cup;Straw;Single sips  Patient Positioning: Upright;Midline    Oral Phase of Swallow: Impaired  Bolus Retrieval: Intact  Bilabial Seal: Intact  Bolus Formation: Intact  Bolus Propulsion: Intact  Mastication: Impaired  Retention: Impaired    Pharyngeal Phase of Swallow: Impaired  Laryngeal Elevation Timing: Appears intact  Laryngeal Elevation Strength: Appears impaired     (Please note: Silent aspiration cannot be evaluated clinically. Videofluoroscopic Swallow Study is required to rule-out silent aspiration.)    Esophageal Phase of Swallow: No complaints consistent with possible esophageal involvement      GOALS  Goal #1 The patient will tolerate regular consistency and thin liquids without overt signs or symptoms of aspiration with 100 % accuracy over 1 session(s).  In Progress   Goal #2 The patient/family/caregiver will demonstrate understanding and implementation of aspiration precautions and swallow strategies independently over 1 session(s).    In Progress       FOLLOW UP  Treatment Plan/Recommendations: Aspiration precautions  Number of Visits to Meet Established Goals: 1  Follow Up Needed (Documentation Required): Yes  SLP Follow-up Date: 10/03/24    Thank you for your referral.   If  you have any questions, please contact Fabi Griffiths, AALIYAH Griffiths M.S. CCC-SLP  Speech Language Pathologist  Phone Number [phone]. [phone]

## 2024-10-02 NOTE — PLAN OF CARE
Pt Aox4. Pt on RA this AM but switched to 1L NC due to O2 sats at 89%. Pt on IV Unasyn d/t suspected pneumonia. Speech eval done today, thin liquids no straw. US of carotid and EEG done today. Orthostatic VS done and documented.  Call light within reach. Safety precautions in place.     Problem: Patient Centered Care  Goal: Patient preferences are identified and integrated in the patient's plan of care  Description: Interventions:  - What would you like us to know as we care for you? From home with grandson  - Provide timely, complete, and accurate information to patient/family  - Incorporate patient and family knowledge, values, beliefs, and cultural backgrounds into the planning and delivery of care  - Encourage patient/family to participate in care and decision-making at the level they choose  - Honor patient and family perspectives and choices  Outcome: Progressing     Problem: RESPIRATORY - ADULT  Goal: Achieves optimal ventilation and oxygenation  Description: INTERVENTIONS:  - Assess for changes in respiratory status  - Assess for changes in mentation and behavior  - Position to facilitate oxygenation and minimize respiratory effort  - Oxygen supplementation based on oxygen saturation or ABGs  - Provide Smoking Cessation handout, if applicable  - Encourage broncho-pulmonary hygiene including cough, deep breathe, Incentive Spirometry  - Assess the need for suctioning and perform as needed  - Assess and instruct to report SOB or any respiratory difficulty  - Respiratory Therapy support as indicated  - Manage/alleviate anxiety  - Monitor for signs/symptoms of CO2 retention  Outcome: Progressing     Problem: MUSCULOSKELETAL - ADULT  Goal: Return mobility to safest level of function  Description: INTERVENTIONS:  - Assess patient stability and activity tolerance for standing, transferring and ambulating w/ or w/o assistive devices  - Assist with transfers and ambulation using safe patient handling equipment as  needed  - Ensure adequate protection for wounds/incisions during mobilization  - Obtain PT/OT consults as needed  - Advance activity as appropriate  - Communicate ordered activity level and limitations with patient/family  Outcome: Progressing  Goal: Maintain proper alignment of affected body part  Description: INTERVENTIONS:  - Support and protect limb and body alignment per provider's orders  - Instruct and reinforce with patient and family use of appropriate assistive device and precautions (e.g. spinal or hip dislocation precautions)  Outcome: Progressing

## 2024-10-02 NOTE — ED QUICK NOTES
Orders for admission, patient is aware of plan and ready to go upstairs. Any questions, please call ED RN sarah at extension [phone].     Patient Covid vaccination status: Fully vaccinated     COVID Test Ordered in ED: None    COVID Suspicion at Admission: N/A    Running Infusions:  None    Mental Status/LOC at time of transport: x4    Other pertinent information:   CIWA score: N/A   NIH score:  N/A

## 2024-10-02 NOTE — H&P
[facility]    PATIENT'S NAME: GEORGIA STRONG   ATTENDING PHYSICIAN: Jose Ramos MD   PATIENT ACCOUNT#:   [id number]    LOCATION:  [address]  MEDICAL RECORD #:   [id number]       YOB: 1933  ADMISSION DATE:       10/01/2024    HISTORY AND PHYSICAL EXAMINATION    CHIEF COMPLAINT:  Syncopal episode and possible aspiration pneumonia.    HISTORY OF PRESENT ILLNESS:  The patient is a 89-year-old  female who came into the emergency department after she had a syncopal episode at home.  Noted in the emergency room to have pulse ox of 84% on room air.  She had CBC and chemistry.  CBC showed white blood cell count of 10.7 with left shift.  Chemistry showed GFR of 59 which is below her baseline.  Glucose 193.  Troponin and lipase, and B natriuretic peptide were negative.  CT scan of the chest, abdomen, and pelvis showed possible enterocolitis in the appropriate clinical setting, moderate amount of fluid within the stomach, can be secondary to gastritis or related recent vomiting.  Nonspecific 2.5 cm jorge hepatis, 2 cm gastrohepatic ligament lymph node, needs followup.  CT scan of the cervical spine and CT scan of the brain both were unremarkable.    PAST MEDICAL HISTORY:  Hypertension, hyperlipidemia, diabetes mellitus type 2, generalized osteoarthritis, gastroesophageal reflux disease, peripheral neuropathy.    PAST SURGICAL HISTORY:  Left total shoulder arthroplasty, D and C procedure, and appendectomy.    MEDICATIONS:  Please see medication reconciliation list.     FAMILY HISTORY:  Positive for diabetes mellitus type 2.  Father had cerebrovascular accident.    SOCIAL HISTORY:  Ex-tobacco user.  No current tobacco, alcohol, or drug use.  Lives with her family.  Independent for basic activities of daily living.     REVIEW OF SYSTEMS:  Her appetite has not been good lately and today she felt lightheaded and dizzy, and she felt she want to throw up, slightly nauseous and then she  passed out.  She does not remember what happened but when she woke up, she coughed up phlegm and she called an ambulance.  Other 12-point review of systems is negative.       PHYSICAL EXAMINATION:    GENERAL:  Alert and oriented to time, place, and person.  No acute distress.  VITAL SIGNS:  Temperature 97.6, pulse 84, respiratory rate 20, blood pressure 150/76, pulse ox 99% on room air.    HEENT:  Atraumatic.  Oropharynx clear.  Dry mucous membranes.  NECK:  Supple.  No lymphadenopathy.  Trachea midline.   LUNGS:  Faint rhonchi auscultated on left lung base.  HEART:  Regular rate, rhythm.  S1 and S2 auscultated.  No murmur.  ABDOMEN:  Soft, nondistended.  No tenderness.  Positive bowel sounds.   EXTREMITIES:  No edema, clubbing, or cyanosis.  NEUROLOGIC:  Motor and sensory intact.    ASSESSMENT:    1.   Syncopal episode, possible vasovagal.  2.   Hypoxia post syncope and clinical scenario suggestive of possible aspiration pneumonia.  3.   Mild acute kidney injury.  4.   Diabetes mellitus type 2.    PLAN:  Patient will be admitted to general medical floor.  IV fluids.  IV Unasyn.  Monitor Accu-Cheks.  Monitor hemodynamic status.  Fall precautions.  Physical and occupational therapy.  Continue oxygen support.  Further recommendations to follow.     Dictated By Azra Grewal MD  d: 10/01/2024 20:00:12  t: 10/01/2024 20:20:29  Job [id number]  FB/

## 2024-10-02 NOTE — PROGRESS NOTES
Progress Note     Rossy Cano Patient Status:  Inpatient    1933 MRN [id number]   Location [facility]5W Attending Bette Ryan MD   Hosp Day # 1 PCP Luiz Galvan MD     Subjective:   S: Patient on room air this morning, talking in full sentences, denying acute complaints.   Passed ST eval.     Review of Systems:   10 point ROS completed and was negative, except for pertinent positive and negatives stated in subjective.    Objective:   Vital signs:  Temp:  [97.6 °F (36.4 °C)-99.1 °F (37.3 °C)] 97.7 °F (36.5 °C)  Pulse:  [75-85] 81  Resp:  [14-21] 20  BP: (110-155)/(52-84) 115/84  SpO2:  [84 %-100 %] 93 %    Wt Readings from Last 6 Encounters:   10/01/24 174 lb 14.4 oz (79.3 kg)   23 169 lb 6.4 oz (76.8 kg)   23 172 lb (78 kg)   04/10/22 175 lb (79.4 kg)   21 170 lb (77.1 kg)   20 161 lb 12.8 oz (73.4 kg)         Physical Exam:    General: Younger than stated age, No acute distress. Talking in full senteces without distress, AxO3  Respiratory: Clear to auscultation bilaterally. No wheezes. No rhonchi.  Cardiovascular: RRR  Abdomen: Soft, nontender, nondistended.  Positive bowel sounds. No rebound or guarding.  Neurologic: No focal neurological deficits.   Musculoskeletal: Moves all extremities.  Extremities: No edema.    Results:   Diagnostic Data:      Labs:    Labs Last 24 Hours:   BMP     CBC    Other     Na 140 Cl 106 BUN 21 Glu 194   Hb 15.0   PTT - Procal -   K 4.8 CO2 27.0 Cr 0.92   WBC 10.7 >< .0  INR - CRP -   Renal Lytes Endo    Hct 45.1   Trop - D dim -   eGFR - Ca 8.9 POC Gluc  173    LFT   pBNP 290 Lactic 1.8   eGFR AA - PO4 - A1c 8.6   AST 27 APk 58 Prot 6.4  LDL -     Mg - TSH 1.166   ALT 15 T glenys 0.9 Alb 4.0        COVID-19 Lab Results    COVID-19  Lab Results   Component Value Date    COVID19 Not Detected 2023    COVID19 Not Detected 2020    COVID19 Not Detected 2020       Pro-Calcitonin  No results for input(s): \"PCT\" in  the last 168 hours.    Cardiac  Recent Labs   Lab 10/01/24  1849   PBNP 290       Creatinine Kinase  No results for input(s): \"CK\" in the last 168 hours.    Inflammatory Markers  No results for input(s): \"CRP\", \"NICOLE\", \"LDH\", \"DDIMER\" in the last 168 hours.    Imaging: Imaging data reviewed in Epic.    Medications:    heparin  5,000 Units Subcutaneous 2 times per day    ampicillin-sulbactam  3 g Intravenous q6h    insulin aspart  1-7 Units Subcutaneous TID CC and HS       Assessment & Plan:   ASSESSMENT / PLAN:     # Syncopal episode  - found face down at home after unwitnessed fall. On arrival to ED was hypoxic and noted to vomit.  - possible vasovagal from dehydration in setting of acute illness, however infectious workup thus far unrevealing. Initial concern for aspiration pneumonia and put on antibiotics.  - admission EKG unremarkable  - TSH, US carotid, orthostatics, ECHO ordered  - neurology consult  - cont tele monitoring     #Acute Hypoxic Resp Distress  #Aspiration pneumonia?   - patient presented with syncopal episode, CT abd revealed possible enterocolitis but clinically patient has no GI symptoms  - pt started on IV unasyn for pna, however imaging unrevealing and no fevers or leukocytosis   - cont IVFs  - ST eval   - patient on 1L NC, wean as tolerated    #HTN  - Bps stable low, will hold home antihypertensives for now     #DM2      Dispo: patient lives with grandson who works during the day so she is alone. CM for home health investigation.         MDM: High, acute illness/severe exacerbation of chronic illness posing threat to life.  IV medications requiring close inpatient monitoring    Bette Ryan MD    Supplementary Documentation:         **Certification      PHYSICIAN Certification of Need for Inpatient Hospitalization - Initial Certification    Patient will require inpatient services that will reasonably be expected to span two midnight's based on the clinical documentation in H+P.   Based on  patients current state of illness, I anticipate that, after discharge, patient will require TBD.

## 2024-10-02 NOTE — OCCUPATIONAL THERAPY NOTE
OCCUPATIONAL THERAPY EVALUATION - INPATIENT     Room Number: 502/502-A  Evaluation Date: 10/2/2024  Type of Evaluation: Initial  Presenting Problem: acute hypoxemic respiratory failure; s/p fall    Physician Order: IP Consult to Occupational Therapy  Reason for Therapy: ADL/IADL Dysfunction and Discharge Planning    OCCUPATIONAL THERAPY ASSESSMENT   Patient is a 89 year old female admitted 10/1/2024 for acute hypoxemic respiratory failure.  Prior to admission, patient's baseline is independent with ADLs and functional mobility, requiring assist with IADLs.  Patient is currently functioning below baseline with toileting, bathing, upper body dressing, lower body dressing, grooming, bed mobility, transfers, dynamic standing balance, functional standing tolerance, and energy conservation strategies.  Patient is requiring minimal assist and moderate assist as a result of the following impairments: decreased functional strength, decreased endurance, pain, impaired dynamic standing balance, decreased muscular endurance, and medical status. Occupational Therapy will continue to follow for duration of hospitalization.    Patient will benefit from continued skilled OT Services to promote return to prior level of function and safety with continuous assistance and gradual rehabilitative therapy.    PLAN DURING HOSPITALIZATION  OT Device Recommendations: TBD  OT Treatment Plan: Balance activities;Energy conservation/work simplification techniques;ADL training;IADL training;Functional transfer training;UE strengthening/ROM;Endurance training;Patient/Family education;Patient/Family training;Equipment eval/education     OCCUPATIONAL THERAPY MEDICAL/SOCIAL HISTORY   Problem List  Principal Problem:    Acute hypoxemic respiratory failure (HCC)  Active Problems:    Syncope and collapse    Nausea and vomiting in adult    Aspiration pneumonitis (HCC)    Hypoxemia    HOME SITUATION  Type of Home: House  Home Layout: One level  Lives  With: -- (grandson lives at home, but works; daughter checks in)  Toilet and Equipment: Standard height toilet  Shower/Tub and Equipment: Walk-in shower  Other Equipment: None  Occupation/Status: retired  Hand Dominance: Right  Drives: No  Patient Regularly Uses: None; Rolling walker    Stairs in Home: none  Use of Assistive Device(s): RW    Prior Level of [place]: Prior to admission, patient was supine in bed with all ADLs and IADLs. Patient lives in a house with grandson and has intermittent assist from daughter. Patient was not driving and currently does not work. Patient used no RW for mobility.     SUBJECTIVE  \"I feel okay.\"    OCCUPATIONAL THERAPY EXAMINATION      OBJECTIVE  Precautions: Bed/chair alarm  Fall Risk: High fall risk      PAIN ASSESSMENT  [place]      COGNITION  Overall Cognitive Status:  WFL - within functional limits and some mild impairments    VISION  Current Vision: no visual deficits    PERCEPTION  Overall Perception Status:   WFL - within functional limits    SENSATION  Light touch:  intact    Communication: Able to communicate wants and needs     Behavioral/Emotional/Social: Calm and cooperative     RANGE OF MOTION   Upper extremity ROM is within functional limits     STRENGTH ASSESSMENT  Upper extremity strength is within functional limits     COORDINATION  Gross Motor: WFL   Fine Motor: WFL     ACTIVITIES OF DAILY LIVING ASSESSMENT  AM-PAC ‘6-Clicks’ Inpatient Daily Activity Short Form  How much help from another person does the patient currently need…  -   Putting on and taking off regular lower body clothing?: A Lot  -   Bathing (including washing, rinsing, drying)?: A Lot  -   Toileting, which includes using toilet, bedpan or urinal? : A Lot  -   Putting on and taking off regular upper body clothing?: A Little  -   Taking care of personal grooming such as brushing teeth?: A Little  -   Eating meals?: A Little    AM-PAC Score:  Score: 15  Approx Degree of Impairment:  56.46%  Standardized Score (AM-PAC Scale): 34.69  CMS Modifier (G-Code): CK    FUNCTIONAL TRANSFER ASSESSMENT  Sit to Stand: Edge of Bed; Chair  Edge of Bed: Minimal Assist  Chair: Minimal Assist  Toilet Transfer: Moderate Assist (x2)    BED MOBILITY  Supine to Sit : Moderate Assist    FUNCTIONAL ADL ASSESSMENT  Eating: Stand-by Assist  Grooming Seated: Stand-by Assist  Bathing Seated: Moderate Assist  UB Dressing Seated: Minimal Assist  LB Dressing Seated: Maximum Assist  Toileting Seated: Maximum Assist    THERAPEUTIC EXERCISE     Skilled Therapy Provided: Patient received supine in bed. Patient performing ADLs and functional mobility at a min-max assist level this session. Patient reports that she has support at home, but is home for extended periods of time by herself. Patient most limited by overall functional endurance. Education provided on body mechanics and energy conservation and how that manifests functionally while completing ADLs and functional mobility. Patient with good return demonstration on all education.     EDUCATION PROVIDED  Patient Education : Role of Occupational Therapy; Plan of Care; Functional Transfer Techniques; Fall Prevention; Posture/Positioning; Energy Conservation; Proper Body Mechanics  Patient's Response to Education: Verbalized Understanding; Returned Demonstration; Demonstrates Poor Carry Over to Information    The patient's Approx Degree of Impairment: 56.46% has been calculated based on documentation in the [facility] '6 clicks' Inpatient Daily Activity Short Form.  Research supports that patients with this level of impairment may benefit from subacute rehab.  Final disposition will be made by interdisciplinary medical team.     Patient End of Session: Up in chair;Needs met;Call light within reach;RN aware of session/findings;Hospital anti-slip socks;All patient questions and concerns addressed;Alarm set    OT Goals  Patients self stated goal is: unstated     Patient will complete  functional transfer with mod I  Comment:     Patient will complete toileting with mod I  Comment:     Patient will tolerate standing for 4 minutes in prep for adls with mod I   Comment:    Patient will complete item retrieval with mod I  Comment:          Goals  on: 10/16/24  Frequency: 3-5x/week    Patient Evaluation Complexity Level:   Occupational Profile/Medical History MODERATE - Expanded review of history including review of medical or therapy record   Specific performance deficits impacting engagement in ADL/IADL MODERATE  3 - 5 performance deficits   Client Assessment/Performance Deficits MODERATE - Comorbidities and min to mod modifications of tasks    Clinical Decision Making MODERATE - Analysis of occupational profile, detailed assessments, several treatment options    Overall Complexity MODERATE     Self-Care Home Management: 15 minutes  Therapeutic Activity: 8 minutes    Margareth Tineo, OTR/L  [facility]  [id number]

## 2024-10-02 NOTE — PHYSICAL THERAPY NOTE
PHYSICAL THERAPY EVALUATION - INPATIENT     Room Number: 502/502-A  Evaluation Date: 10/2/2024  Type of Evaluation: Initial   Physician Order: PT Eval and Treat    Presenting Problem: acute resp failure and fall at home  Co-Morbidities : hard of hearing  Reason for Therapy: Mobility Dysfunction and Discharge Planning    PHYSICAL THERAPY ASSESSMENT   Patient is a 89 year old female admitted 10/1/2024 for acute respiratory failure with hypoxia.  Prior to admission, patient's baseline is independent with ambulation, has IADL assist from family but is often home alone.  Patient is currently functioning below baseline with bed mobility, transfers, gait, and stair negotiation.  Patient is requiring minimal assist as a result of the following impairments: decreased functional strength, decreased endurance/aerobic capacity, and medical status.  Physical Therapy will continue to follow for duration of hospitalization.    Patient will benefit from continued skilled PT Services to promote return to prior level of function and safety with continuous assistance and gradual rehabilitative therapy .    PLAN DURING HOSPITALIZATION  Nursing Mobility Recommendation : 1 Assist  PT Device Recommendation: Rolling walker  PT Treatment Plan: Bed mobility;Body mechanics;Endurance;Energy conservation;Family education;Gait training;Range of motion;Stoop training;Transfer training;Balance training  Rehab Potential : Fair  Frequency (Obs): 3-5x/week     PHYSICAL THERAPY MEDICAL/SOCIAL HISTORY   History related to current admission: fall at home      Problem List  Principal Problem:    Acute hypoxemic respiratory failure (HCC)  Active Problems:    Syncope and collapse    Nausea and vomiting in adult    Aspiration pneumonitis (HCC)    Hypoxemia      HOME SITUATION  Type of Home: House  Home Layout: One level  Stairs to Enter : 1   Railing: No              Lives With:  (grandson lives at home, but works; daughter checks in)    Drives: No    Patient Regularly Uses: None;Rolling walker     Prior Level of [place]: ambulatory with RW    SUBJECTIVE  \"I just passed out in the kitchen.\"     PHYSICAL THERAPY EXAMINATION   OBJECTIVE  Precautions: Bed/chair alarm  Fall Risk: High fall risk    WEIGHT BEARING RESTRICTION       PAIN ASSESSMENT     Location: R hip pain - chronic       COGNITION  Overall Cognitive Status:  appears baseline, slight confusion but also [place]    BALANCE  Static Sitting: Fair +  Dynamic Sitting: Fair  Static Standing: Fair -  Dynamic Standing: Poor +    AM-PAC '6-Clicks' INPATIENT SHORT FORM - BASIC MOBILITY  How much difficulty does the patient currently have...  Patient Difficulty: Turning over in bed (including adjusting bedclothes, sheets and blankets)?: A Little   Patient Difficulty: Sitting down on and standing up from a chair with arms (e.g., wheelchair, bedside commode, etc.): A Little   Patient Difficulty: Moving from lying on back to sitting on the side of the bed?: A Little   How much help from another person does the patient currently need...   Help from Another: Moving to and from a bed to a chair (including a wheelchair)?: A Little   Help from Another: Need to walk in hospital room?: A Little   Help from Another: Climbing 3-5 steps with a railing?: Total     AM-PAC Score:  Raw Score: 16   Approx Degree of Impairment: 54.16%   Standardized Score (AM-PAC Scale): 40.78   CMS Modifier (G-Code): CK    FUNCTIONAL ABILITY STATUS  Functional Mobility/Gait Assessment  Gait Assistance: Minimum assistance  Distance (ft): 15', 10'  Assistive Device: Rolling walker  Pattern: Shuffle  Rolling: minimal assist  Supine to Sit: minimal assist  Sit to Supine: minimal assist  Sit to Stand: minimal assist and moderate assist; min A from chair, mod x 2 from commode    Exercise/Education Provided:  Bed mobility  Body mechanics  Functional activity tolerated  Gait training  Transfer training    Education Provided To: Patient   Patient  Education: Role of Physical Therapy;DME Recommendations;Functional Transfer Techniques;Fall Prevention;Posture/Positioning;Proper Body Mechanics;Energy Conservation;Gait Training   Patient's Response to Education: Requires Further Education       The patient's Approx Degree of Impairment: 54.16% has been calculated based on documentation in the [facility] '6 clicks' Inpatient Basic Mobility Short Form.  Research supports that patients with this level of impairment may benefit from SHARATH.  Final disposition will be made by interdisciplinary medical team.    Patient End of Session: Up in chair;Needs met;Call light within reach;RN aware of session/findings;All patient questions and concerns addressed;Alarm set    CURRENT GOALS  Goals to be met by:   Patient Goal Patient's self-stated goal is: to get better   Goal #1 Patient is able to demonstrate supine - sit EOB @ level: supervision     Goal #1   Current Status    Goal #2 Patient is able to demonstrate transfers Sit to/from Stand at assistance level: supervision with walker - rolling     Goal #2  Current Status    Goal #3 Patient is able to ambulate 50 feet with assist device: walker - rolling at assistance level: supervision   Goal #3   Current Status    Goal #4 Patient will negotiate 2 stairs/one curb w/ assistive device and supervision   Goal #4   Current Status    Goal #5 Patient to demonstrate independence with home activity/exercise instructions provided to patient in preparation for discharge.   Goal #5   Current Status    Goal #6    Goal #6  Current Status      Patient Evaluation Complexity Level:  History Moderate - 1 or 2 personal factors and/or co-morbidities   Examination of body systems Moderate - addressing a total of 3 or more elements   Clinical Presentation  Moderate - Evolving   Clinical Decision Making  Moderate Complexity     Gait Trainin minutes  Therapeutic Activity:  10 minutes

## 2024-10-02 NOTE — PROCEDURES
EEG report    REFERRING PHYSICIAN: Bette Ryan MD    PCP and phone number:  Anthony Salinas MD  [phone]    TECHNIQUE: 21 channels of EEG, 2 channels of EOG, and 1 channel of EKG were recorded utilizing the International 10/20 System. The recording was performed in a digitized monopolar referential format and playback was reformatted into various referential and bipolar montages utilizing appropriate filter settings. Automatic seizure and spike detection programs were utilized throughout the recording.  Video was not recorded during the study    CLINICAL DATA:  Patient is sent for the evaluation of possible seizures.    MEDICATION:  Continuous Medications:   sodium chloride 100 mL/hr at 10/02/24 1006       Scheduled Medications:  No current outpatient medications on file.    PRN Medications:    acetaminophen    ondansetron    metoclopramide    glucose **OR** glucose **OR** glucose-vitamin C **OR** dextrose **OR** glucose **OR** glucose **OR** glucose-vitamin C    ACTIVATION:  Hyperventilation: Not done  Photic stimulation: Not done  Sleep: Normal sleep architecture was seen.    BACKGROUND  While the patient was awake, the posterior dominant rhythm consisted of well-regulated 9-10 Hz rhythmic waveforms, symmetrically distributed over both posterior quadrants and was reactive to eye opening.    EEG ABNORMALITY  None    IMPRESSION:  This is a normal EEG. No focal, lateralized, or epileptiform features are noted. Clinical correlation required.

## 2024-10-02 NOTE — PROGRESS NOTES
Orthostatic Vital Signs completed.    Lying:  /55  HR 74   spO2 95% 1L NC     Sitting:  /73  HR 80  spO2 93% 1L NC    Standing:  /65  HR 86  spO2 94% 1L NC

## 2024-10-02 NOTE — CM/SW NOTE
10/02/24 1200   CM/SW Referral Data   Referral Source    Reason for Referral Discharge planning   Informant Patient;Daughter;EMR   Medical Hx   Does patient have an established PCP? Yes  (Anthony Salinas MD)   Significant Past Medical/Mental Health Hx Hypertension, hyperlipidemia, diabetes mellitus type 2, generalized osteoarthritis, gastroesophageal reflux disease, peripheral neuropathy   Patient Info   Patient's Current Mental Status at Time of Assessment Alert;Oriented   Patient's Home Environment House   Number of Levels in Home 1   Number of Stair in Home 1   Patient lives with Grandchild   Patient Status Prior to Admission   Independent with ADLs and Mobility No   Pt. requires assistance with Housework;Driving;Meals;Bathing;Ambulating   Services in place prior to admission DME/Supplies at home   Type of DME/Supplies Wheeled Walker;Rollator Walker;Shower Chair;Raised Toilet Seat;Grab Bars   Discharge Needs   Anticipated D/C needs Subacute rehab   Services Requested   Submitted to [facility] Yes   PASRR Level 1 Submitted Yes     CM self-referred for discharge planning.  Above assessment completed with patient and patient's daughter Lelo at bedside. Address on file and PCP verified.    Patient's lives with her grandson in a one-story home.  Patient primarily ambulates with a walker.  Patient's daughter Lelo states patient needs assistance with showering (daughter provides assistance) and meal prepping (grandson provides assistance).  Patient independently feeds and toilets herself. Patient has no history of home oxygen, but is currently requiring supplemental oxygen while inpatient.    Anticipated therapy need: Gradual Rehabilitative Therapy    CM discussed anticipated therapy need above with patient/family.  Patient's daughter Lelo states patient/family are agreeable to [facility] at this time, based on patient's physical performance today. Lelo states patient has distant history of home health  arrangement, but is not current with an agency.    [facility] submitted to evaluate patient appropriateness for [facility] - pending response.    CM sent [facility] referrals via [employer].  PASRR completed and uploaded to referral.    Pending [facility], CM will provide list of [facility] facilities/confirm choice.  Patient will require three inpatient midnights to qualify using Medicare benefits.    1506 CM provided [facility] list to patient's daughter Lelo at bedside for review.    / to remain available for support and/or discharge planning.     Plan: [facility], pending [facility], choice facility, medical clearance    Irma Mejias RN, BSN  Nurse   [phone]

## 2024-10-03 ENCOUNTER — APPOINTMENT (OUTPATIENT)
Dept: CV DIAGNOSTICS | Facility: HOSPITAL | Age: 89
End: 2024-10-03
Attending: HOSPITALIST
Payer: MEDICARE

## 2024-10-03 LAB
GLUCOSE BLDC GLUCOMTR-MCNC: 182 MG/DL (ref 70–99)
GLUCOSE BLDC GLUCOMTR-MCNC: 239 MG/DL (ref 70–99)
GLUCOSE BLDC GLUCOMTR-MCNC: 252 MG/DL (ref 70–99)
GLUCOSE BLDC GLUCOMTR-MCNC: 253 MG/DL (ref 70–99)

## 2024-10-03 PROCEDURE — 93306 TTE W/DOPPLER COMPLETE: CPT | Performed by: HOSPITALIST

## 2024-10-03 PROCEDURE — 99223 1ST HOSP IP/OBS HIGH 75: CPT | Performed by: OTHER

## 2024-10-03 PROCEDURE — 99233 SBSQ HOSP IP/OBS HIGH 50: CPT | Performed by: STUDENT IN AN ORGANIZED HEALTH CARE EDUCATION/TRAINING PROGRAM

## 2024-10-03 NOTE — PROGRESS NOTES
Update:    Discussed with Dr. Ryan. She asked if I am able to confirm code status. POLST comfort on file. Reviewed code status with patient and family. Patient would like to return to the hospital for treatment. We discussed these goals are consistent with DNAR selective. Patient and daughter at the bedside in agreement. New POLST completed and copy given to family.

## 2024-10-03 NOTE — PLAN OF CARE
Problem: Patient Centered Care  Goal: Patient preferences are identified and integrated in the patient's plan of care  Description: Interventions:  - What would you like us to know as we care for you? Home with grandson.   - Provide timely, complete, and accurate information to patient/family  - Incorporate patient and family knowledge, values, beliefs, and cultural backgrounds into the planning and delivery of care  - Encourage patient/family to participate in care and decision-making at the level they choose  - Honor patient and family perspectives and choices  Outcome: Progressing     Problem: RESPIRATORY - ADULT  Goal: Achieves optimal ventilation and oxygenation  Description: INTERVENTIONS:  - Assess for changes in respiratory status  - Assess for changes in mentation and behavior  - Position to facilitate oxygenation and minimize respiratory effort  - Oxygen supplementation based on oxygen saturation or ABGs  - Provide Smoking Cessation handout, if applicable  - Encourage broncho-pulmonary hygiene including cough, deep breathe, Incentive Spirometry  - Assess the need for suctioning and perform as needed  - Assess and instruct to report SOB or any respiratory difficulty  - Respiratory Therapy support as indicated  - Manage/alleviate anxiety  - Monitor for signs/symptoms of CO2 retention  Outcome: Progressing     Problem: MUSCULOSKELETAL - ADULT  Goal: Return mobility to safest level of function  Description: INTERVENTIONS:  - Assess patient stability and activity tolerance for standing, transferring and ambulating w/ or w/o assistive devices  - Assist with transfers and ambulation using safe patient handling equipment as needed  - Ensure adequate protection for wounds/incisions during mobilization  - Obtain PT/OT consults as needed  - Advance activity as appropriate  - Communicate ordered activity level and limitations with patient/family  Outcome: Progressing  Goal: Maintain proper alignment of affected body  part  Description: INTERVENTIONS:  - Support and protect limb and body alignment per provider's orders  - Instruct and reinforce with patient and family use of appropriate assistive device and precautions (e.g. spinal or hip dislocation precautions)  Outcome: Progressing      A&O x 4. On room air, saturating 92%.   No complaints of pain.  Blood glucose monitoring per orders. Patient tolerating current diet, good appetite, aspiration precautions observed. Up with standby assistance and rolling walker. Rounding preformed, call light and personal items within reach.     Plan for discharge in the morning 10/04. Transport arranged for 10 am. Will endorse to night shift RN.

## 2024-10-03 NOTE — PALLIATIVE CARE NOTE
Discussed case with Dr. Ryan and Hanna STEVENSON. GOC and code status were clarified with family and PC consult to be cancelled.     Zoila George, ANP-BC, ACHPN

## 2024-10-03 NOTE — CONGREGATE LIVING REVIEW
[facility] Living Authorization    The [facility] Review Committee has reviewed this case and the patient IS APPROVED for discharge to a facility for Short Term Skilled once the following procedure is followed:     - The physician discharge instructions (contained within the SUSI note for SNF) must inlcude the below appropriate and approved COVID instructions to the facility    For questions regarding CLRC approval process, please contact the CM assigned to the case.  For questions regarding RN discharge workflow, please contact the unit Clinical Leader.

## 2024-10-03 NOTE — CONSULTS
[facility]  [address] [place] [address], [address]  [facility] [address]  [phone]            Rossy Cano Patient Status:  Inpatient    1933 MRN [id number]   Location [facility]5W Attending Bette Ryan MD   Hosp Day # 2 PCP Anthony Salinas MD     Date of Admission:  10/1/2024  Date of Consult:  10/3/2024  Reason for Consultation:   Syncope    History of Present Illness:   Patient is a 89 year old female who was admitted to the hospital for Acute hypoxemic respiratory failure (HCC):  Patient presented to the hospital in 2024 with an episode of syncopal event and possible aspiration pneumonia.  She was standing up to go to the bathroom to take a pill, when she started to feel fainting sensation, she knew that she was in a pass out, she tried to make it back to the couch.  However she fainted before she got there, she woke up on the ground, she called for help, she knew where she was.  But she did not know what happened.  No loss of bladder control, no tongue biting.      Past Medical History  Past Medical History:    Arthritis    Diabetes (HCC)    High blood pressure    High cholesterol    Hyperlipidemia    Osteoarthritis    Schatzki's ring    Unspecified essential hypertension    UTI (urinary tract infection)    Visual impairment       Past Surgical History  Past Surgical History:   Procedure Laterality Date    Anesth,shoulder replacement Left 16    DR LEWIS    Appendectomy      Appendectomy      Colonoscopy      D & c      Dilation/curettage,diagnostic      Egd  3/14       Family History  Family History   Problem Relation Age of Onset    Diabetes Father     Stroke Father     Diabetes Mother     Cancer Brother         liver    Cancer Other         family h/o of prostate       Social History  Pediatric History   Patient Parents    Not on file     Other Topics Concern    Not on file   Social History Narrative    Not on file           Current  Medications:  Current Facility-Administered Medications   Medication Dose Route Frequency    atorvastatin (Lipitor) tab 40 mg  40 mg Oral Daily    [Held by provider] difluprednate 0.05 % ophthalmic emulsion 1 drop (patient own medication)  1 drop Ophthalmic 4x daily    dorzolamide (Trusopt) 2 % ophthalmic solution 1 drop  1 drop Left Eye 4x daily    gabapentin (Neurontin) cap 100 mg  100 mg Oral TID    insulin degludec (Tresiba) 100 units/mL flextouch 18 Units  18 Units Subcutaneous Nightly    sodium chloride 0.9% infusion   Intravenous Continuous    heparin (Porcine) 5000 UNIT/ML injection 5,000 Units  5,000 Units Subcutaneous 2 times per day    acetaminophen (Tylenol Extra Strength) tab 500 mg  500 mg Oral Q4H PRN    ondansetron (Zofran) 4 MG/2ML injection 4 mg  4 mg Intravenous Q6H PRN    metoclopramide (Reglan) 5 mg/mL injection 5 mg  5 mg Intravenous Q8H PRN    ampicillin-sulbactam (Unasyn) 3 g in sodium chloride 0.9% 100mL IVPB-ADD  3 g Intravenous q6h    glucose (Dex4) 15 GM/59ML oral liquid 15 g  15 g Oral Q15 Min PRN    Or    glucose (Glutose) 40% oral gel 15 g  15 g Oral Q15 Min PRN    Or    glucose-vitamin C (Dex-4) chewable tab 4 tablet  4 tablet Oral Q15 Min PRN    Or    dextrose 50% injection 50 mL  50 mL Intravenous Q15 Min PRN    Or    glucose (Dex4) 15 GM/59ML oral liquid 30 g  30 g Oral Q15 Min PRN    Or    glucose (Glutose) 40% oral gel 30 g  30 g Oral Q15 Min PRN    Or    glucose-vitamin C (Dex-4) chewable tab 8 tablet  8 tablet Oral Q15 Min PRN    insulin aspart (NovoLOG) 100 Units/mL FlexPen 1-7 Units  1-7 Units Subcutaneous TID CC and HS     Medications Prior to Admission   Medication Sig    amLODIPine 5 MG Oral Tab Take 1 tablet (5 mg total) by mouth daily.    gabapentin 100 MG Oral Cap Take 1 capsule (100 mg total) by mouth 3 (three) times daily.    hydroCHLOROthiazide 12.5 MG Oral Tab Take 1 tablet (12.5 mg total) by mouth 3 (three) times a week. Take 1 on Monday, Wednesday, ans Friday.     latanoprost 0.005 % Ophthalmic Solution Place into both eyes nightly.    difluprednate 0.05 % Ophthalmic Emulsion Apply 1 drop to eye in the morning, at noon, in the evening, and at bedtime. Left eye    dorzolamide 2 % Ophthalmic Solution Place 1 drop into the left eye in the morning, at noon, in the evening, and at bedtime.    LUTEIN OR Take 1 tablet by mouth daily.      MetFORMIN HCl  MG Oral Tablet 24 Hr Take 1 tablet (500 mg total) by mouth 3 (three) times daily.    LANTUS SOLOSTAR 100 UNIT/ML Subcutaneous Solution Pen-injector Inject 20 Units into the skin nightly.    Insulin Syringes, Disposable, U-100 0.5 ML Does not apply Misc     FreeStyle Lancets Does not apply Misc     Glucose Blood In Vitro Strip     Atorvastatin Calcium (LIPITOR) 40 MG Oral Tab Take 1 tablet (40 mg total) by mouth daily.    lisinopril (PRINIVIL,ZESTRIL) 40 MG Oral Tab Take 1 tablet (40 mg total) by mouth daily.    metoprolol succinate  MG Oral Tablet 24 Hr Take 1 tablet (100 mg total) by mouth daily.    Multiple Vitamins-Minerals (MULTI FOR HER 50+) Oral Cap Take 1 tablet by mouth.      Fluticasone Propionate 50 MCG/ACT Nasal Suspension 1 spray by Nasal route 2 (two) times daily. (Patient not taking: Reported on 10/1/2024)    aspirin 81 MG Oral Chew Tab Chew 81 mg by mouth. (Patient not taking: Reported on 10/1/2024)    Insulin Lispro, Human, (HUMALOG) 100 UNIT/ML Subcutaneous Solution 3 times a day with meals-using up to 6 units three times a day (Patient not taking: Reported on 10/1/2024)       Allergies  Allergies   Allergen Reactions    Bactrim [Sulfamethoxazole W/Trimethoprim] RASH     rash    Shellfish-Derived Products      Other reaction(s): (DO NOT USE, NOT SCREENED) SHELLFISH CONT PROD       Review of Systems:   As in HPI, the rest of the 14 system review was done and was negative    Physical Exam:     Vitals:    10/03/24 0049 10/03/24 0552 10/03/24 0853 10/03/24 1229   BP:  151/64 146/67 137/63   Pulse:  79 76 84    Resp:  18 18 18   Temp:  98.1 °F (36.7 °C) 98.4 °F (36.9 °C) 98.8 °F (37.1 °C)   TempSrc:  Oral Oral Oral   SpO2: 92% 94% 92% 92%   Weight:       Height:           General: No apparent distress, well nourished, well groomed.  Head- Normocephalic, atraumatic  Eyes- No redness or swelling  ENT- Hearing intake, smell preserved, normal glutition  Neck- No masses or adenopathy  Cv: pulses were palpable and normal, no cyanosis or edema     Neurological:     Mental Status- Alert and oriented x3.  Normal attention span and concentration  Thought process intact  Memory intact- recent and remote  Mood intact  Fund of knowledge appropriate for education and age    Language intact including: comprehension, naming, repetition, vocabulary    Cranial Nerves:  VII. Face symmetric, no facial weakness  VIII. Hearing intact.  IX. Pallet elevates symmetrically.  XI. Shoulder shrug is intact  XII. Tongue is midline    Motor Exam:  Muscle tone normal  No atrophy or fasciculations  Strength- upper extremities 5/5 proximally and distally                     Results:     Laboratory Data:  Lab Results   Component Value Date    WBC 5.4 10/02/2024    HGB 14.2 10/02/2024    HCT 42.2 10/02/2024    .0 10/02/2024    CREATSERUM 0.91 10/02/2024    BUN 15 10/02/2024     10/02/2024    K 4.2 10/02/2024     10/02/2024    CO2 24.0 10/02/2024     (H) 10/02/2024    CA 8.6 (L) 10/02/2024    ALB 4.0 10/01/2024    ALKPHO 58 10/01/2024    TP 6.4 10/01/2024    AST 27 10/01/2024    ALT 15 10/01/2024    TSH 1.166 10/01/2024    LIP 35 10/01/2024    MG 1.8 04/10/2022    TROP <0.045 04/23/2020         Imaging:    US CAROTID DOPPLER BILAT - DIAG IMG (CPT=93880)    Result Date: 10/2/2024  CONCLUSION:   No sonographic evidence of hemodynamically significant stenosis in the bilateral carotid or vertebral systems.  Mild plaque is noted at the bilateral carotid bulbs.    elm-remote    Dictated by (CST): Jose D Huntley MD on 10/02/2024 at 4:26 PM      Finalized by (CST): Jose D Huntley MD on 10/02/2024 at 4:27 PM          CT CHEST+ABDOMEN+PELVIS(ALL CNTRST ONLY)(CPT=71260/27926)    Result Date: 10/1/2024  CONCLUSION:   Imaging findings which can represent enterocolitis in the appropriate clinical setting.  Moderate amount of fluid within the stomach can be secondary to gastritis or related to recent vomiting.  Nonspecific 2.5 cm jorge hepatis and 2.0 cm gastrohepatic ligament lymph nodes.  Could be reactive related to the aforementioned process.  Other etiologies not excluded.  No evidence of traumatic injury in the chest, abdomen, or pelvis.  Interlobular septal thickening bilaterally within the lungs, can represent atypical/viral pneumonia or interstitial pulmonary edema.  Small cystic pancreatic lesions again noted, measuring up to 0.9 cm.  If clinically relevant in a patient of this age, further assessment can be made with contrast enhanced abdominal MRI/MRCP  Colonic diverticulosis.     Dictated by (CST): Sheri Moise MD on 10/01/2024 at 6:38 PM     Finalized by (CST): Sheri Moise MD on 10/01/2024 at 6:55 PM           CT SPINE CERVICAL (CPT=72125)    Result Date: 10/1/2024  CONCLUSION:  1. No acute fracture or subluxation.    Dictated by (CST): Paul Henry MD on 10/01/2024 at 6:30 PM     Finalized by (CST): Paul Henry MD on 10/01/2024 at 6:34 PM          CT BRAIN OR HEAD (CPT=70450)    Result Date: 10/1/2024  CONCLUSION:  1. No acute intracranial finding. 2. Mild changes of chronic small vessel disease in cerebral white matter.    Dictated by (CST): Paul Henry MD on 10/01/2024 at 6:28 PM     Finalized by (CST): Paul Henry MD on 10/01/2024 at 6:30 PM               Impression:       Possible vasovagal versus orthostatic hypotension.  Low suspicion for seizure.  Nonetheless CT of the head and EEG was done and that was not revealing.  No additional neurologic recommendations at this time.    Thank you for allowing me to participate in  the care of your patient.    Santosh Cardenas MD  10/3/2024

## 2024-10-03 NOTE — PHYSICAL THERAPY NOTE
PHYSICAL THERAPY TREATMENT NOTE - INPATIENT     Room Number: 502/502-A       Presenting Problem: acute resp failure and fall at home  Co-Morbidities : hard of hearing    Problem List  Principal Problem:    Acute hypoxemic respiratory failure (HCC)  Active Problems:    Syncope and collapse    Nausea and vomiting in adult    Aspiration pneumonitis (HCC)    Hypoxemia      PHYSICAL THERAPY ASSESSMENT   Patient demonstrates good  progress this session, goals  remain in progress.      Patient is requiring contact guard assist and minimal assist as a result of the following impairments: decreased endurance/aerobic capacity, pain, decreased muscular endurance, and medical status.     Patient continues to function below baseline with transfers, gait, and stair negotiation.  Next session anticipate patient to progress transfers, gait, and stair negotiation.  Physical Therapy will continue to follow patient for duration of hospitalization.    Patient continues to benefit from continued skilled PT services: to promote return to prior level of function and safety with continuous assistance and gradual rehabilitative therapy .    PLAN DURING HOSPITALIZATION  Nursing Mobility Recommendation : 1 Assist  PT Device Recommendation: Rolling walker;Gait belt  PT Treatment Plan: Bed mobility;Body mechanics;Endurance;Energy conservation;Family education;Gait training;Range of motion;Stoop training;Transfer training;Balance training  Frequency (Obs): 3-5x/week     SUBJECTIVE  \"If it wasn't for my hip, I could go farther.\"    OBJECTIVE  Precautions: Bed/chair alarm    WEIGHT BEARING RESTRICTION  none    PAIN ASSESSMENT   Rating: Unable to rate  Location: R hip  Management Techniques: Body mechanics;Activity promotion    BALANCE  Static Sitting: Good  Dynamic Sitting: Fair +  Static Standing: Fair +  Dynamic Standing: Fair    ACTIVITY TOLERANCE  Pulse: 82 (at rest, 90s with gait)  Heart Rate Source: Monitor     BP: 133/59  BP Location: Right  arm  BP Method: Automatic  Patient Position: Lying     O2 WALK  Oxygen Therapy  SPO2% on Room Air at Rest: 92  SPO2% Ambulation on Room Air: 96    AM-PAC '6-Clicks' INPATIENT SHORT FORM - BASIC MOBILITY  How much difficulty does the patient currently have...  Patient Difficulty: Turning over in bed (including adjusting bedclothes, sheets and blankets)?: A Little   Patient Difficulty: Sitting down on and standing up from a chair with arms (e.g., wheelchair, bedside commode, etc.): A Little   Patient Difficulty: Moving from lying on back to sitting on the side of the bed?: A Little   How much help from another person does the patient currently need...   Help from Another: Moving to and from a bed to a chair (including a wheelchair)?: A Little   Help from Another: Need to walk in hospital room?: A Little   Help from Another: Climbing 3-5 steps with a railing?: A Lot     AM-PAC Score:  Raw Score: 17   Approx Degree of Impairment: 50.57%   Standardized Score (AM-PAC Scale): 42.13   CMS Modifier (G-Code): CK    FUNCTIONAL ABILITY STATUS  Functional Mobility/Gait Assessment  Gait Assistance: Contact guard assist  Distance (ft): 20+35  Assistive Device: Rolling walker  Pattern: Comment (step-through pattern, minimal BLE foot clearance and decreased step length, no LOB, distance limited by R hip pain and decreased endurance)  Supine to Sit: stand-by assist - head of bed elevated, bedrail used, increased time  Sit to Stand: contact guard assist and minimal assist - from edge of bed, bedside chair, commode chair    Skilled Therapy Provided: Since previous session patient has progressed, requiring decreased assist for slightly longer ambulation distances. Pt experiencing R hip pain and increased work of breathing with activity limiting ambulation distances. Pt's daughter present, family in support of rehab prior to return home.     Patient received semi-fowlers in bed, agreeable to physical therapy. Vital signs monitored as  noted above, patient experiencing increased work of breathing with activity, SpO2 96% on room air with activity . Anticipated therapy needs remain appropriate based on the patient's performance, personal factors, and remaining functional impairments.    PATIENT EDUCATION  Education Provided To: Patient  Patient Education: Role of Physical Therapy;DME Recommendations;Functional Transfer Techniques;Fall Prevention;Posture/Positioning;Proper Body Mechanics;Energy Conservation;Gait Training  Patient's Response to Education: Requires Further Education          The patient's Approx Degree of Impairment: 50.57% has been calculated based on documentation in the [facility] '6 clicks' Inpatient Daily Activity Short Form.  Research supports that patients with this level of impairment may benefit from a rehab facility.  Final disposition will be made by interdisciplinary medical team.    Patient End of Session: Up in chair;Needs met;Call light within reach;RN aware of session/findings;All patient questions and concerns addressed;Family present;Alarm set    CURRENT GOALS   Goals to be met by: 11/2  Patient Goal Patient's self-stated goal is: to get better   Goal #1 Patient is able to demonstrate supine - sit EOB @ level: supervision     Goal #1   Current Status NOT MET/IN PROGRESS    Goal #2 Patient is able to demonstrate transfers Sit to/from Stand at assistance level: supervision with walker - rolling     Goal #2  Current Status NOT MET/IN PROGRESS    Goal #3 Patient is able to ambulate 50 feet with assist device: walker - rolling at assistance level: supervision   Goal #3   Current Status NOT MET/IN PROGRESS    Goal #4 Patient will negotiate 2 stairs/one curb w/ assistive device and supervision   Goal #4   Current Status NOT MET/IN PROGRESS    Goal #5 Patient to demonstrate independence with home activity/exercise instructions provided to patient in preparation for discharge.   Goal #5   Current Status IN PROGRESS/ONGOING    Goal  #6    Goal #6  Current Status      Therapeutic Activity: 20 minutes      Moon Rose, PT, DPT  [facility]  Rehab Services - Physical Therapy  [id number]

## 2024-10-03 NOTE — SLP NOTE
SPEECH DAILY NOTE - INPATIENT    ASSESSMENT & PLAN   ASSESSMENT  PPE REQUIRED. THIS THERAPIST WORE GLOVES, DROPLET MASK, AND GOGGLES FOR DURATION OF EVALUATION. HANDS WASHED UPON ENTRANCE/EXIT.    SLP f/u for ongoing dysphagia tx/meal assessment per recommendations of regular/thin per BSE. RN reports pt tolerates diet and medication well with no overt clinical s/s aspiration. Pt denies any swallowing challenges.     Pt positioned upright in bed with family at bedside. Pt afebrile, tolerating room air with oxygen status 94 prior to the start of oral trials. SLP reviewed aspiration precautions and safe swallowing compensatory strategies with the patient. Safe swallow guidelines remain written on the white board in purple. Diet recommendations remain on the whiteboard in the room. Patient and family v/u. Provided no assistance, pt tolerates regular and thin liquids via open cup with no overt clinical signs/symptoms of aspiration. Oxygen status remained >92 t/o the entire session. Oral/buccal cavities clear of residue following all trials.     PLAN: SLP to sign off at this time secondary to pt tolerating least restrictive diet with no CSA.     Diet Recommendations - Solids: Regular  Diet Recommendations - Liquids: Thin Liquids    Compensatory Strategies Recommended: No straws;Slow rate;Alternate consistencies;Small bites and sips  Aspiration Precautions: Upright position;Slow rate;Small bites and sips;No straw  Medication Administration Recommendations: One pill at a time    Patient Experiencing Pain: Yes     Pain Location: hip     Nursing Aware of Pain?: Yes    Treatment Plan  Treatment Plan/Recommendations: No further inpatient SLP service warranted    Interdisciplinary Communication: Discussed with RN  Recommendations posted at bedside            GOALS  Goal #1 The patient will tolerate regular consistency and thin liquids without overt signs or symptoms of aspiration with 100 % accuracy over 1 session(s).  Goal met    Goal #2 The patient/family/caregiver will demonstrate understanding and implementation of aspiration precautions and swallow strategies independently over 1 session(s).    Goal met     FOLLOW UP  Follow Up Needed (Documentation Required): No  SLP Follow-up Date:  (n/a)  Number of Visits to Meet Established Goals: 0    Session: 1 following BSSE    If you have any questions, please contact AALIYAH Judd M.S. CCC-SLP  Speech Language Pathologist  Phone Number [phone]

## 2024-10-03 NOTE — PROGRESS NOTES
Progress Note     Rossy Cano Patient Status:  Inpatient    1933 MRN [id number]   Location [facility]5W Attending Bette Ryan MD   Hosp Day # 2 PCP Anthony Salinas MD     Subjective:   S: Patient seen at bedside with daughter in room. Reviewed POLST form.  Patient feeling better, not dizzy.     Review of Systems:   10 point ROS completed and was negative, except for pertinent positive and negatives stated in subjective.    Objective:   Vital signs:  Temp:  [97.8 °F (36.6 °C)-98.8 °F (37.1 °C)] 98.8 °F (37.1 °C)  Pulse:  [74-86] 84  Resp:  [18-20] 18  BP: (113-151)/(55-73) 137/63  SpO2:  [89 %-95 %] 92 %    Wt Readings from Last 6 Encounters:   10/01/24 174 lb 14.4 oz (79.3 kg)   23 169 lb 6.4 oz (76.8 kg)   23 172 lb (78 kg)   04/10/22 175 lb (79.4 kg)   21 170 lb (77.1 kg)   20 161 lb 12.8 oz (73.4 kg)         Physical Exam:    General: No acute distress. Talking in full senteces without distress, AxO3  Respiratory: Clear to auscultation bilaterally. No wheezes. No rhonchi.  Cardiovascular: RRR  Abdomen: Soft, nontender, nondistended.  Positive bowel sounds. No rebound or guarding.  Neurologic: No focal neurological deficits.   Musculoskeletal: Moves all extremities.  Extremities: No edema.    Results:   Diagnostic Data:      Labs:    Labs Last 24 Hours:   BMP     CBC    Other     Na - Cl - BUN 15 Glu -   Hb -   PTT - Procal -   K 4.2 CO2 - Cr -   WBC - >< PLT -  INR - CRP -   Renal Lytes Endo    Hct -   Trop - D dim -   eGFR - Ca - POC Gluc  253    LFT   pBNP - Lactic -   eGFR AA - PO4 - A1c -   AST - APk - Prot -  LDL -     Mg - TSH -   ALT - T glenys - Alb -        COVID-19 Lab Results    COVID-19  Lab Results   Component Value Date    COVID19 Not Detected 2023    COVID19 Not Detected 2020    COVID19 Not Detected 2020       Pro-Calcitonin  No results for input(s): \"PCT\" in the last 168 hours.    Cardiac  Recent Labs   Lab 10/01/24  4405   PBNP  290       Creatinine Kinase  No results for input(s): \"CK\" in the last 168 hours.    Inflammatory Markers  No results for input(s): \"CRP\", \"NICOLE\", \"LDH\", \"DDIMER\" in the last 168 hours.    Imaging: Imaging data reviewed in Epic.    Medications:    atorvastatin  40 mg Oral Daily    [Held by provider] difluprednate  1 drop Ophthalmic 4x daily    dorzolamide  1 drop Left Eye 4x daily    gabapentin  100 mg Oral TID    insulin degludec  18 Units Subcutaneous Nightly    heparin  5,000 Units Subcutaneous 2 times per day    ampicillin-sulbactam  3 g Intravenous q6h    insulin aspart  1-7 Units Subcutaneous TID CC and HS       Assessment & Plan:   ASSESSMENT / PLAN:     Hospital Course  The patient is a 89-year-old  female who came into the emergency department after she had a syncopal episode at home. Fall was unwitnessed. She lives with a grandson, but he works in the day and she is alone. She woke up and pressed her medical bracelet. In ED, sp02 84% on room air, with improvement to 95% on 2L NC. Her vitals and labs were unremarkable. No fevers no leukocytosis. CT abd revealed possible enterocolitis but clinically patient had no GI symptoms. Lung imaging negative for pneumonia.   She was admitted for syncope evaluation. Of note, daughter reports day of fall she had a doctor's appointment earlier in the day and had skipped her meals, and that likely she was dehydrated.   During hospital stay workup with CT head, EEG, EKG, TSH, US carotid, and ECHO were unremarkable. Neurology was consulted and patient's syncope was thought to be orthostatic/vasovagal in etiology. Patient worked with PT who recommended further rehab. Home medications to be restarted on discharge.         # Syncopal episode  - found face down at home after unwitnessed fall. On arrival to ED was hypoxic and noted to vomit.  - possible vasovagal from dehydration in setting of acute illness, however infectious workup thus far unrevealing. Initial concern  for aspiration pneumonia and put on antibiotics.  - EKG, TSH, US carotid, orthostatics, ECHO , unremarkable   - orthostatics were positive, so syncope could have been ortostatic/vasovagal in etiology  - neurology consulted, recommendations appreciated  - cont tele monitoring     #Acute Hypoxic Resp Distress, resolved   #Aspiration pneumonia?   - patient presented with syncopal episode, CT abd revealed possible enterocolitis but clinically patient has no GI symptoms  - pt started on IV unasyn for pna, however imaging unrevealing and no fevers or leukocytosis   - cont IVFs  - ST eval   - patient weaned off oxygen    #HTN  - Bps stable low, will hold home antihypertensives for now     #DM2      Dispo: pending [facility], transfer tomorrow        MDM: High, acute illness/severe exacerbation of chronic illness posing threat to life.  IV medications requiring close inpatient monitoring    Bette Ryan MD    Supplementary Documentation:         **Certification      PHYSICIAN Certification of Need for Inpatient Hospitalization - Initial Certification    Patient will require inpatient services that will reasonably be expected to span two midnight's based on the clinical documentation in H+P.   Based on patients current state of illness, I anticipate that, after discharge, patient will require TBD.

## 2024-10-03 NOTE — PLAN OF CARE
Problem: Patient Centered Care  Goal: Patient preferences are identified and integrated in the patient's plan of care  Description: Interventions:  - What would you like us to know as we care for you?   - Provide timely, complete, and accurate information to patient/family  - Incorporate patient and family knowledge, values, beliefs, and cultural backgrounds into the planning and delivery of care  - Encourage patient/family to participate in care and decision-making at the level they choose  - Honor patient and family perspectives and choices  Outcome: Progressing     Problem: RESPIRATORY - ADULT  Goal: Achieves optimal ventilation and oxygenation  Description: INTERVENTIONS:  - Assess for changes in respiratory status  - Assess for changes in mentation and behavior  - Position to facilitate oxygenation and minimize respiratory effort  - Oxygen supplementation based on oxygen saturation or ABGs  - Provide Smoking Cessation handout, if applicable  - Encourage broncho-pulmonary hygiene including cough, deep breathe, Incentive Spirometry  - Assess the need for suctioning and perform as needed  - Assess and instruct to report SOB or any respiratory difficulty  - Respiratory Therapy support as indicated  - Manage/alleviate anxiety  - Monitor for signs/symptoms of CO2 retention  Outcome: Progressing     Problem: MUSCULOSKELETAL - ADULT  Goal: Return mobility to safest level of function  Description: INTERVENTIONS:  - Assess patient stability and activity tolerance for standing, transferring and ambulating w/ or w/o assistive devices  - Assist with transfers and ambulation using safe patient handling equipment as needed  - Ensure adequate protection for wounds/incisions during mobilization  - Obtain PT/OT consults as needed  - Advance activity as appropriate  - Communicate ordered activity level and limitations with patient/family  Outcome: Progressing  Goal: Maintain proper alignment of affected body part  Description:  INTERVENTIONS:  - Support and protect limb and body alignment per provider's orders  - Instruct and reinforce with patient and family use of appropriate assistive device and precautions (e.g. spinal or hip dislocation precautions)  Outcome: Progressing

## 2024-10-03 NOTE — CM/SW NOTE
10/03/24 1449   Discharge disposition   Expected discharge disposition subacute   Post Acute Care Provider Other  ([facility])   [facility]     Destination: [address] [address]  Call for report to: [phone]  Transportation provider- [facility]   DC Time: 10 AM    MDO placed for discharge.  CM confirmed with Dr. Ryan via phone - she is aware of 3 IP midnight need for SHARATH qualification and is OK with patient discharging tomorrow morning 10/4.  CM confirmed with [facility] liacalvin Jim - the earliest time they are able to accept is 10 AM tomorrow. CM arranged [facility] for 10 AM  10/4.  PCS form completed.  Patient and family aware of plan - CM updated at bedside. EVITA Reyes notified of above.    Patient cleared for dc tomorrow from CM/SW standpoint.    Irma Mejias RN, BSN  Nurse   [phone]

## 2024-10-03 NOTE — CM/SW NOTE
10/03/24 1100   Choice of Post-Acute Provider   Informed patient of right to choose their preferred provider Yes   List of appropriate post-acute services provided to patient/family with quality data Yes   Patient/family choice [facility]   Information given to Patient;Daughter     [facility] approved patient for [facility].     CM met with patient and patient's daughter Lelo at bedside and confirmed patient/family first choice [facility] is [facility]  Lelo states second choice is [facility] in the event [facility] has no beds available on day of discharge.    CM reserved [facility] via [facility] and notified liaison of choice.     Patient will qualify for [facility] pending 3 IP midnights, with anticipated discharge date of 10/4 or later.    / to remain available for support and/or discharge planning.     Plan: [facility], pending 3 IP midnights, medical clearance    Irma Mejias RN, BSN  Nurse   [phone]

## 2024-10-04 VITALS
HEIGHT: 67 IN | RESPIRATION RATE: 18 BRPM | OXYGEN SATURATION: 92 % | SYSTOLIC BLOOD PRESSURE: 137 MMHG | DIASTOLIC BLOOD PRESSURE: 71 MMHG | TEMPERATURE: 98 F | BODY MASS INDEX: 27.45 KG/M2 | HEART RATE: 101 BPM | WEIGHT: 174.88 LBS

## 2024-10-04 LAB — GLUCOSE BLDC GLUCOMTR-MCNC: 189 MG/DL (ref 70–99)

## 2024-10-04 PROCEDURE — 99239 HOSP IP/OBS DSCHRG MGMT >30: CPT | Performed by: INTERNAL MEDICINE

## 2024-10-04 NOTE — PLAN OF CARE
0955: [facility] provide [facility]. Report given to Enriqueta JAMA. All questions have been answered.

## 2024-10-04 NOTE — DISCHARGE SUMMARY
[facility]  part of [facility]    DISCHARGE SUMMARY     Rossy Cano Patient Status:  Inpatient    1933 MRN [id number]   Location [facility]5W Attending Ashlee Brown MD   Hosp Day # 3 PCP Anthony Salinas MD     Date of Admission: 10/1/2024  Date of Discharge:  10/04/2024    Discharge Disposition: Home or Self Care    Discharge Diagnosis:     Syncopal episode  Acute hypoxic respiratory distress  ?aspiration PNA    History of Present Illness:     The patient is a 89-year-old  female who came into the emergency department after she had a syncopal episode at home. Noted in the emergency room to have pulse ox of 84% on room air. She had CBC and chemistry. CBC showed white blood cell count of 10.7 with left shift. Chemistry showed GFR of 59 which is below her baseline. Glucose 193. Troponin and lipase, and B natriuretic peptide were negative. CT scan of the chest, abdomen, and pelvis showed possible enterocolitis in the appropriate clinical setting, moderate amount of fluid within the stomach, can be secondary to gastritis or related recent vomiting. Nonspecific 2.5 cm jorge hepatis, 2 cm gastrohepatic ligament lymph node, needs followup. CT scan of the cervical spine and CT scan of the brain both were unremarkable.     Brief Synopsis:     The patient is a 89-year-old  female who came into the emergency department after she had a syncopal episode at home. Fall was unwitnessed. She lives with a grandson, but he works in the day and she is alone. She woke up and pressed her medical bracelet. In ED, sp02 84% on room air, with improvement to 95% on 2L NC. Her vitals and labs were unremarkable. No fevers no leukocytosis. CT abd revealed possible enterocolitis but clinically patient had no GI symptoms. Lung imaging negative for pneumonia.   She was admitted for syncope evaluation. Of note, daughter reports day of fall she had a doctor's appointment earlier in the day  and had skipped her meals, and that likely she was dehydrated.               During hospital stay workup with CT head, EEG, EKG, TSH, US carotid, and ECHO were unremarkable. Neurology was consulted and patient's syncope was thought to be orthostatic/vasovagal in etiology. Patient worked with PT who recommended further rehab. Home medications to be restarted on discharge.        # Syncopal episode  - found face down at home after unwitnessed fall. On arrival to ED was hypoxic and noted to vomit.  - possible vasovagal from dehydration in setting of acute illness, however infectious workup thus far unrevealing. Initial concern for aspiration pneumonia and put on antibiotics.  - EKG, TSH, US carotid, orthostatics, ECHO , unremarkable   - orthostatics were positive, so syncope could have been ortostatic/vasovagal in etiology  - neurology consulted, CT head and EEG not revealing. No further work up     #Acute Hypoxic Resp Distress, resolved   #Aspiration pneumonia?   - patient presented with syncopal episode, CT abd revealed possible enterocolitis but clinically patient has no GI symptoms  - pt started on IV unasyn for pna, however imaging unrevealing and no fevers or leukocytosis   - cont IVFs  - ST eval   - patient weaned off oxygen  - discharging on PO abx to complete course of treatment    Patient is to follow up with PCP and Neuro as opt for further care.   Patient is to remain compliant with all discharge medications and instructions and to follow up as advised.   Patient encouraged to make healthy lifestyle and dietary changes.    Lace+ Score: 74  59-90 High Risk  29-58 Medium Risk  0-28   Low Risk       TCM Follow-Up Recommendation:  LACE > 58: High Risk of readmission after discharge from the hospital.      Procedures during hospitalization:   None    Incidental or significant findings and recommendations (brief descriptions):  Noen    Lab/Test results pending at Discharge:    None    Consultants:  Neuro    Discharge Medication List:     Discharge Medications        START taking these medications        Instructions Prescription details   amoxicillin clavulanate 875-125 MG Tabs  Commonly known as: Augmentin      Take 1 tablet by mouth 2 (two) times daily for 4 days.   Stop taking on: October 8, 2024  Quantity: 8 tablet  Refills: 0            CONTINUE taking these medications        Instructions Prescription details   amLODIPine 5 MG Tabs  Commonly known as: Norvasc      Take 1 tablet (5 mg total) by mouth daily.   Refills: 0     atorvastatin 40 MG Tabs  Commonly known as: Lipitor      Take 1 tablet (40 mg total) by mouth daily.   Refills: 0     FreeStyle Lancets Misc       Refills: 0     gabapentin 100 MG Caps  Commonly known as: Neurontin      Take 1 capsule (100 mg total) by mouth 3 (three) times daily.   Refills: 0     Glucose Blood Strp       Refills: 0     hydroCHLOROthiazide 12.5 MG Tabs      Take 1 tablet (12.5 mg total) by mouth 3 (three) times a week. Take 1 on Monday, Wednesday, ans Friday.   Refills: 0     Insulin Syringes (Disposable) U-100 0.5 ML Misc       Refills: 0     Lantus SoloStar 100 UNIT/ML Sopn  Generic drug: insulin glargine      Inject 20 Units into the skin nightly.   Refills: 0     latanoprost 0.005 % Soln  Commonly known as: Xalatan      Place into both eyes nightly.   Refills: 0     lisinopril 40 MG Tabs  Commonly known as: Prinivil; Zestril      Take 1 tablet (40 mg total) by mouth daily.   Refills: 0     LUTEIN OR      Take 1 tablet by mouth daily.   Refills: 0     metFORMIN  MG Tb24  Commonly known as: Glucophage XR      Take 1 tablet (500 mg total) by mouth 3 (three) times daily.   Refills: 0     metoprolol succinate  MG Tb24  Commonly known as: Toprol XL      Take 1 tablet (100 mg total) by mouth daily.   Refills: 0     Multi For Her 50+ Caps      Take 1 tablet by mouth.   Refills: 0            STOP taking these medications      aspirin 81  MG Chew        difluprednate 0.05 % Emul        dorzolamide 2 % Soln  Commonly known as: Trusopt        fluticasone propionate 50 MCG/ACT Susp  Commonly known as: Flonase        insulin lispro 100 UNIT/ML Soln  Commonly known as: Humalog                  Where to Get Your Medications        These medications were sent to [id number] - [place], [place] - [address] [phone], [phone]  [address], [facility] [address]      Phone: [phone]   amoxicillin clavulanate 875-125 MG Tabs         [place]    Follow-up appointment:   Anthony Salinas MD  [address] [facility] [address] [facility] [id number] [phone]    Schedule an appointment as soon as possible for a visit in 1 week(s)  Diabetes / Hospital follow up    Santosh Cardenas MD  [address]  [facility] [id number] [phone]    Follow up in 1 week(s)      Appointments for Next 30 Days 10/4/2024 - 11/3/2024      None            Vital signs:  Temp:  [97.7 °F (36.5 °C)-98.8 °F (37.1 °C)] 97.7 °F (36.5 °C)  Pulse:  [] 101  Resp:  [18] 18  BP: (133-157)/(59-83) 137/71  SpO2:  [90 %-92 %] 92 %    Physical Exam:    Gen: NAD AO x3  Chest: good air entry CTABL  CVS: normal s1 and s2 RR  Abd: NABS soft NT ND  Neuro: CN 2-12 grossly intact  Ext: no edema in bilateral LE    -----------------------------------------------------------------------------------------------  PATIENT DISCHARGE INSTRUCTIONS: See electronic chart    Ashlee Brown MD  Hospitalist    Time spent:  > 30 minutes    The 21st Century Cures Act makes medical notes like these available to patients in the interest of transparency. Please be advised this is a medical document. Medical documents are intended to carry relevant information, facts as evident, and the clinical opinion of the practitioner. The medical note is intended as peer to peer communication and may appear blunt or direct. It is written in medical language and may contain abbreviations or  verbiage that are unfamiliar.

## 2024-10-04 NOTE — PLAN OF CARE
Problem: Patient Centered Care  Goal: Patient preferences are identified and integrated in the patient's plan of care  Description: Interventions:  - What would you like us to know as we care for you? Home with grandson  - Provide timely, complete, and accurate information to patient/family  - Incorporate patient and family knowledge, values, beliefs, and cultural backgrounds into the planning and delivery of care  - Encourage patient/family to participate in care and decision-making at the level they choose  - Honor patient and family perspectives and choices  Outcome: Progressing     Problem: RESPIRATORY - ADULT  Goal: Achieves optimal ventilation and oxygenation  Description: INTERVENTIONS:  - Assess for changes in respiratory status  - Assess for changes in mentation and behavior  - Position to facilitate oxygenation and minimize respiratory effort  - Oxygen supplementation based on oxygen saturation or ABGs  - Provide Smoking Cessation handout, if applicable  - Encourage broncho-pulmonary hygiene including cough, deep breathe, Incentive Spirometry  - Assess the need for suctioning and perform as needed  - Assess and instruct to report SOB or any respiratory difficulty  - Respiratory Therapy support as indicated  - Manage/alleviate anxiety  - Monitor for signs/symptoms of CO2 retention  Outcome: Progressing     Problem: MUSCULOSKELETAL - ADULT  Goal: Return mobility to safest level of function  Description: INTERVENTIONS:  - Assess patient stability and activity tolerance for standing, transferring and ambulating w/ or w/o assistive devices  - Assist with transfers and ambulation using safe patient handling equipment as needed  - Ensure adequate protection for wounds/incisions during mobilization  - Obtain PT/OT consults as needed  - Advance activity as appropriate  - Communicate ordered activity level and limitations with patient/family  Outcome: Progressing  Goal: Maintain proper alignment of affected body  part  Description: INTERVENTIONS:  - Support and protect limb and body alignment per provider's orders  - Instruct and reinforce with patient and family use of appropriate assistive device and precautions (e.g. spinal or hip dislocation precautions)  Outcome: Progressing     Problem: Patient/Family Goals  Goal: Patient/Family Long Term Goal  Description: Patient's Long Term Goal: return home     Interventions:  - follow doctors recommendations   - See additional Care Plan goals for specific interventions  Outcome: Progressing  Goal: Patient/Family Short Term Goal  Description: Patient's Short Term Goal:     Interventions:   -   - See additional Care Plan goals for specific interventions  Outcome: Progressing     Problem: SAFETY ADULT - FALL  Goal: Free from fall injury  Description: INTERVENTIONS:  - Assess pt frequently for physical needs  - Identify cognitive and physical deficits and behaviors that affect risk of falls.  - Lanagan fall precautions as indicated by assessment.  - Educate pt/family on patient safety including physical limitations  - Instruct pt to call for assistance with activity based on assessment  - Modify environment to reduce risk of injury  - Provide assistive devices as appropriate  - Consider OT/PT consult to assist with strengthening/mobility  - Encourage toileting schedule  Outcome: Progressing   No acute changes in patient status, vital signs stable call light within reach, fall precautions in place , discharge to [facility] today by [place] @100 am

## 2024-10-27 ENCOUNTER — PATIENT MESSAGE (OUTPATIENT)
Dept: ORTHOPEDICS CLINIC | Facility: CLINIC | Age: 89
End: 2024-10-27

## 2024-10-29 NOTE — TELEPHONE ENCOUNTER
Per son patient in a rehab  facility and is asking to speak to a nurse or physician assistant regarding scheduling surgery. Please advise

## 2024-10-30 NOTE — TELEPHONE ENCOUNTER
Called Al(on HIPAA) and he wanted to get surgery scheduled for patient. I advised patient would need to be seen in the office again to discuss surgery. Offered appointment but he states his sister will call to schedule the appointment. If patient family member calls back please offer appointment with Dr Abdi to discuss surgery/ok to use consult slot.

## 2024-11-05 ENCOUNTER — OFFICE VISIT (OUTPATIENT)
Dept: ORTHOPEDICS CLINIC | Facility: CLINIC | Age: 89
End: 2024-11-05
Payer: MEDICARE

## 2024-11-05 ENCOUNTER — TELEPHONE (OUTPATIENT)
Dept: ORTHOPEDICS CLINIC | Facility: CLINIC | Age: 89
End: 2024-11-05

## 2024-11-05 DIAGNOSIS — M25.551 RIGHT HIP PAIN: ICD-10-CM

## 2024-11-05 DIAGNOSIS — M16.11 PRIMARY OSTEOARTHRITIS OF RIGHT HIP: Primary | ICD-10-CM

## 2024-11-05 PROCEDURE — 99213 OFFICE O/P EST LOW 20 MIN: CPT | Performed by: ORTHOPAEDIC SURGERY

## 2024-11-05 RX ORDER — LISINOPRIL 20 MG/1
TABLET ORAL
COMMUNITY
Start: 2024-10-01

## 2024-11-05 NOTE — TELEPHONE ENCOUNTER
Ortho Surgery Request  Surgery: Anterior right hip replacement with fluoroscopy      Surgical Assistant: Meghann Mccoy PA-C and Tal Luque CSA  Surgery Day Request:   Estimated Procedure Length: 3 hours  Anesthesia type: Spinal Duramorph/MAC   Position: Supine on Woodworth table   Special Needs:[]Mini C-Arm  [x]Large C-arm  []Nurse Assist [x]SCD's [x]Surgical Assistant []Ultrasound []Ultrasound Gaetano  Equipment: Woodworth table, Medacta Dual Mobility anterior hip replacement.  Medacta polished cemented stems.  Location: Main OR  Post Op Visit Date: 10 to 12 days postop  CPT Code: 94012, 48800     ICD Code:  Medical Clearance Needed: Medical and dental  Preadmission Testing Orders:  Anesthesia testing protocols and anesthesia pre op orders will be used  Additional PAT orders:  Pre OP Orders:  Use [facility] procedure driven order set in addition to anesthesia protocol  Additional Pre Op Orders:  PCN Allergy:  No  If Yes:   Proceed with PCN/Cephalosporin recommend antibiotic as benefit outweighs risk  Admit:  Outpatient in a [facility]  Yes

## 2024-11-05 NOTE — PROGRESS NOTES
NURSING INTAKE COMMENTS:   Chief Complaint   Patient presents with    Hip Pain     Here w/ the daughter- R hip f/u- rates pain 10/10 depends w/ certain movement- pt is taking gabapentin for the pain- here to discuss sx       HPI: This 89 year old female presents today with her daughter to discuss right hip replacement.  The cortisone injection earlier this year did not give enough relief.  She has persistent groin pain affecting ambulation.  The right leg is almost 3 cm shorter than the left.  She feels it as short.    She recently had a syncopal episode and is currently in a rehab facility.  Normally she lives in a house with her grandson but the daughter said they are working with [facility] to get in-house help most days.  The patient's mind remain sharp.  She is not on anticoagulation.  She is diabetic.    Past Medical History:    Arthritis    Diabetes (HCC)    High blood pressure    High cholesterol    Hyperlipidemia    Osteoarthritis    Schatzki's ring    Unspecified essential hypertension    UTI (urinary tract infection)    Visual impairment     Past Surgical History:   Procedure Laterality Date    Anesth,shoulder replacement Left 11/02/16    DR LEWIS    Appendectomy      Appendectomy      Colonoscopy      D & c      Dilation/curettage,diagnostic      Egd  3/14     Current Outpatient Medications   Medication Sig Dispense Refill    lisinopril 20 MG Oral Tab       amLODIPine 5 MG Oral Tab Take 1 tablet (5 mg total) by mouth daily.      gabapentin 100 MG Oral Cap Take 1 capsule (100 mg total) by mouth 3 (three) times daily.      hydroCHLOROthiazide 12.5 MG Oral Tab Take 1 tablet (12.5 mg total) by mouth 3 (three) times a week. Take 1 on Monday, Wednesday, ans Friday.      latanoprost 0.005 % Ophthalmic Solution Place into both eyes nightly.      LUTEIN OR Take 1 tablet by mouth daily.        MetFORMIN HCl  MG Oral Tablet 24 Hr Take 1 tablet (500 mg total) by mouth 3 (three) times daily.      LANTUS  SOLOSTAR 100 UNIT/ML Subcutaneous Solution Pen-injector Inject 20 Units into the skin nightly.      Insulin Syringes, Disposable, U-100 0.5 ML Does not apply Misc       FreeStyle Lancets Does not apply Misc       Glucose Blood In Vitro Strip       Atorvastatin Calcium (LIPITOR) 40 MG Oral Tab Take 1 tablet (40 mg total) by mouth daily.      lisinopril (PRINIVIL,ZESTRIL) 40 MG Oral Tab Take 1 tablet (40 mg total) by mouth daily.      metoprolol succinate  MG Oral Tablet 24 Hr Take 1 tablet (100 mg total) by mouth daily.      Multiple Vitamins-Minerals (MULTI FOR HER 50+) Oral Cap Take 1 tablet by mouth.         Allergies[1]  Family History   Problem Relation Age of Onset    Diabetes Father     Stroke Father     Diabetes Mother     Cancer Brother         liver    Cancer Other         family h/o of prostate     No family Hx of DVT/PE    Social History     Occupational History    Not on file   Tobacco Use    Smoking status: Former     Current packs/day: 0.00     Types: Cigarettes     Quit date:      Years since quittin.8    Smokeless tobacco: Never   Substance and Sexual Activity    Alcohol use: Not Currently     Comment: occasionally    Drug use: No    Sexual activity: Not on file        Review of Systems:  GENERAL: feels generally well, no significant weight loss or weight gain  SKIN: no ulcerated or worrisome skin lesions  EYES:denies blurred vision or double vision  HEENT: denies new nasal congestion, sinus pain or ST  LUNGS: denies shortness of breath  CARDIOVASCULAR: denies chest pain  GI: no hematemesis, no worsening heartburn, no diarrhea  : no dysuria, no blood in urine, no difficulty urinating, no incontinence  MUSCULOSKELETAL: no other musculoskeletal complaints other than in HPI  NEURO: no numbness or tingling, no weakness or balance disorder  PSYCHE: no depression or anxiety  HEMATOLOGIC: no hx of blood dyscrasia, no Hx DVT/PE  ENDOCRINE: no thyroid or diabetes issues  ALL/ASTHMA: no new hx  of severe allergy or asthma    Physical Examination:    There were no vitals taken for this visit.  Constitutional: appears well hydrated, alert and responsive, no acute distress noted  Extremities: Right lower extremity 3 cm shorter than the left.  Healthy skin on the thigh and leg.  Very minimal groin rash.  No overhanging abdomen.  Healthy skin in the presumed incision area.  Musculoskeletal: Limited internal extra rotation with groin pain.  Left hip within normal limits.  Neurological: Normal motor and sensory bilateral lower extremities and she can do an active straight leg raise.    Imaging: X-rays show end-stage osteoarthritis right hip.      No results found.     Lab Results   Component Value Date    WBC 5.4 10/02/2024    HGB 14.2 10/02/2024    .0 10/02/2024      Lab Results   Component Value Date     (H) 10/02/2024    BUN 15 10/02/2024    CREATSERUM 0.91 10/02/2024    GFRNAA 49 (L) 04/10/2022    GFRAA 57 (L) 04/10/2022        Assessment and Plan:  Diagnoses and all orders for this visit:    Primary osteoarthritis of right hip    Right hip pain        Assessment: Refractory right hip pain from osteoarthritis in an independent 89-year-old.    Plan: I discussed that while hip replacement has risks, I think it is reasonable to proceed with her given that her mind is sharp and she remains independent.  I told her we can lengthen the right leg somewhat but not a lot.    I told her there are risks with surgery such as death, heart attack, stroke, bleeding, infection, blood clot, nerve injury, artery injury, leg length discrepancy, fracture, dislocation, numbness laterally incision, loosening, revision surgery, and persistent pain despite surgery.  She and her daughter asked questions which were answered to their satisfaction.  They wish to proceed.  They said they have thought about this quite a bit and want to proceed with hip replacement.    The plan will be an anterior right hip replacement using  cemented stem and fluoroscopy.  Needs medical and dental clearance.    Follow Up: No follow-ups on file.    Zac Abdi MD         [1]   Allergies  Allergen Reactions    Bactrim [Sulfamethoxazole W/Trimethoprim] RASH     rash    Shellfish-Derived Products      Other reaction(s): (DO NOT USE, NOT SCREENED) SHELLFISH CONT PROD

## 2024-11-08 NOTE — TELEPHONE ENCOUNTER
Type of surgery:  Right total hip arthroplasty, anterior approach and Fluoroscopy   Date: 2/13/25  Location: [facility]  Medical Clearance:      *Medical: yes      *Dental: yes      *Other:  Prior Authorization Status: Pending   Workers Comp:  Medacta/Alexey: Emailed Emanuel   Williams: Yes  POV: 2/25/25

## 2024-11-08 NOTE — TELEPHONE ENCOUNTER
Left message for patient to offer surgery dates. Patient asked me to call her daughter Dmitry at [phone]. I called and spoke with Natalia about surgery dates. She chose 2/13/25. She was reminded that Medical and/or Dental clearance is needed within 30 days of surgical date. Failure to complete all testing in a timely manner will cause surgery to be delayed and/or rescheduled. Patient understood and had no further questions at this time.

## 2024-11-12 ENCOUNTER — TELEPHONE (OUTPATIENT)
Dept: NEPHROLOGY | Facility: CLINIC | Age: 89
End: 2024-11-12

## 2024-11-12 NOTE — TELEPHONE ENCOUNTER
Spoke to Selena from home health and informed patient has not been seen in clinic. Selena verbalized understanding and apologized as she was provided that Dr. Ned Galvan was patients PCP. We may disregard message. No further action required at this time.

## 2024-11-12 NOTE — TELEPHONE ENCOUNTER
Selena calling from [facility] patient will be discharging from [facility] rehab on Thursday and will start home health on Friday calling regards orders. Please call.

## 2024-12-05 ENCOUNTER — OFFICE VISIT (OUTPATIENT)
Dept: PODIATRY CLINIC | Facility: CLINIC | Age: 89
End: 2024-12-05

## 2024-12-05 DIAGNOSIS — I73.9 PVD (PERIPHERAL VASCULAR DISEASE) (HCC): ICD-10-CM

## 2024-12-05 DIAGNOSIS — B35.1 ONYCHOMYCOSIS: ICD-10-CM

## 2024-12-05 DIAGNOSIS — E11.42 TYPE 2 DIABETES MELLITUS WITH POLYNEUROPATHY (HCC): Primary | ICD-10-CM

## 2024-12-05 PROCEDURE — 99213 OFFICE O/P EST LOW 20 MIN: CPT | Performed by: PODIATRIST

## 2024-12-05 PROCEDURE — 11721 DEBRIDE NAIL 6 OR MORE: CPT | Performed by: PODIATRIST

## 2024-12-05 NOTE — PROGRESS NOTES
Reason for Visit      Rossy Cano is a 89 year old female presents today complaining of bilateral diabetic foot evaluation.     History of Present Illness     Patient presents to clinic today after last being seen by podiatry on 8/29/2024 for diabetic foot evaluation.  Patient is a non-insulin-dependent diabetic type II whose last A1c was 8.6.  Patient presents to clinic today complaining of elongated, thickened toenails that she is unable to debride himself.    The following portions of the patient's history were reviewed and updated as appropriate: allergies, current medications, past family history, past medical history, past social history, past surgical history and problem list.    Allergies[1]      Current Outpatient Medications:     lisinopril 20 MG Oral Tab, , Disp: , Rfl:     amLODIPine 5 MG Oral Tab, Take 1 tablet (5 mg total) by mouth daily., Disp: , Rfl:     gabapentin 100 MG Oral Cap, Take 1 capsule (100 mg total) by mouth 3 (three) times daily., Disp: , Rfl:     hydroCHLOROthiazide 12.5 MG Oral Tab, Take 1 tablet (12.5 mg total) by mouth 3 (three) times a week. Take 1 on Monday, Wednesday, ans Friday., Disp: , Rfl:     latanoprost 0.005 % Ophthalmic Solution, Place into both eyes nightly., Disp: , Rfl:     LUTEIN OR, Take 1 tablet by mouth daily.  , Disp: , Rfl:     MetFORMIN HCl  MG Oral Tablet 24 Hr, Take 1 tablet (500 mg total) by mouth 3 (three) times daily., Disp: , Rfl:     LANTUS SOLOSTAR 100 UNIT/ML Subcutaneous Solution Pen-injector, Inject 20 Units into the skin nightly., Disp: , Rfl:     Insulin Syringes, Disposable, U-100 0.5 ML Does not apply Misc, , Disp: , Rfl:     FreeStyle Lancets Does not apply Misc, , Disp: , Rfl:     Glucose Blood In Vitro Strip, , Disp: , Rfl:     Atorvastatin Calcium (LIPITOR) 40 MG Oral Tab, Take 1 tablet (40 mg total) by mouth daily., Disp: , Rfl:     lisinopril (PRINIVIL,ZESTRIL) 40 MG Oral Tab, Take 1 tablet (40 mg total) by mouth daily., Disp: , Rfl:      metoprolol succinate  MG Oral Tablet 24 Hr, Take 1 tablet (100 mg total) by mouth daily., Disp: , Rfl:     Multiple Vitamins-Minerals (MULTI FOR HER 50+) Oral Cap, Take 1 tablet by mouth.  , Disp: , Rfl:     There are no discontinued medications.    Patient Active Problem List   Diagnosis    Recurrent UTI    Microhematuria    Urethral syndrome    Primary osteoarthritis, left shoulder    Diabetes type 2, controlled (HCC)    Hypertension    Dyslipidemia    Community acquired pneumonia of left lower lobe of lung    Hypoxia    Weakness generalized    Acute cystitis without hematuria    Hyperglycemia    Acute hypoxemic respiratory failure (HCC)    Syncope and collapse    Nausea and vomiting in adult    Aspiration pneumonitis (HCC)    Hypoxemia       Past Medical History:    Arthritis    Diabetes (HCC)    High blood pressure    High cholesterol    Hyperlipidemia    Osteoarthritis    Schatzki's ring    Unspecified essential hypertension    UTI (urinary tract infection)    Visual impairment       Past Surgical History:   Procedure Laterality Date    Anesth,shoulder replacement Left 16    DR LEWIS    Appendectomy      Appendectomy      Colonoscopy      D & c      Dilation/curettage,diagnostic      Egd  3/14       Family History   Problem Relation Age of Onset    Diabetes Father     Stroke Father     Diabetes Mother     Cancer Brother         liver    Cancer Other         family h/o of prostate       Social History     Occupational History    Not on file   Tobacco Use    Smoking status: Former     Current packs/day: 0.00     Types: Cigarettes     Quit date:      Years since quittin.9    Smokeless tobacco: Never   Substance and Sexual Activity    Alcohol use: Not Currently     Comment: occasionally    Drug use: No    Sexual activity: Not on file       ROS     Constitutional: negative for chills, fevers and sweats  Gastrointestinal: negative for abdominal pain, diarrhea, nausea and  vomiting  Genitourinary:negative for dysuria and hematuria  Musculoskeletal:negative for arthralgias and muscle weakness  Neurological: negative for paresthesia and weakness  All others reviewed and negative.      Physical Exam     LE PHYSICAL EXAM    Constitution: Well-developed and well-nourished. Gait appears normal. No apparent distress. Alert and oriented to person, place, and time.  Integument: There are no varicosities. Skin appears moist, warm, and supple with positive hair growth. There are no color changes. No open lesions. No macerations, No Hyperkeratotic lesions.  Vascular examination: Dorsalis pedis and posterior tibial pulses are strong bilaterally with capillary filling time less than 3 seconds to all digits. There is no peripheral edema..  Neurological Sensorium: Grossly intact to sharp/dull. Vibratory: Intact.  Musculoskeletal:   5/5 pedal muscle strength b/l     Advanced trophic changes as: hair growth decrease  nail changes  (thickening) pigmentary changes (discoloration) skin texture (thin, shiny)   Procedure       Nails 1 through 5 bilateral debrided with use of a nail nipper.  Patient Toller procedure well had no complications.  Neurovascular status intact to the level of the digits post procedure.     Assessment and Plan     Encounter Diagnoses   Name Primary?    Type 2 diabetes mellitus with polyneuropathy (HCC) Yes    Onychomycosis    Diabetic education and instructions have been provided. We reviewed and discussed the following:    -risk categories related to pts with diabetes and foot or lower extremity complications per ADA.    -adherence to medication regimen and close monitoring or blood sugar control.   -daily monitoring/inspection of feet and shoes.   -proper management of diet and weight   -regular follow up with PCP and specialty providers as recommended   -Lower extremity complications related to DM were reviewed and stressed prevention.           Nails 1 through 5 bilateral  debrided with use of a nail nipper.  Patient Toller procedure well had no complications.  Neurovascular status intact to the level of the digits post procedure.     Patient was instructed to call the office or on-call podiatric physician immediately with any issues or concerns before the next scheduled visit. Patient to follow-up in clinic in 3 months      Teodora Olivas DPM, D.BRINDA ARCOS  Diplomat, American Board of Foot and Ankle Surgery  Certified in Foot and Rearfoot/Ankle Reconstruction  Fellow of the American College of Foot and Ankle Surgeons  Fellowship Trained Foot and Ankle Surgeon   [facility]     12/5/2024    2:53 PM       [1]   Allergies  Allergen Reactions    Bactrim [Sulfamethoxazole W/Trimethoprim] RASH     rash    Shellfish-Derived Products      Other reaction(s): (DO NOT USE, NOT SCREENED) SHELLFISH CONT PROD

## 2025-02-11 RX ORDER — INSULIN LISPRO-AABC 100 [IU]/ML
5 INJECTION, SOLUTION SUBCUTANEOUS 3 TIMES DAILY
COMMUNITY
Start: 2024-11-27

## 2025-02-12 ENCOUNTER — ANESTHESIA EVENT (OUTPATIENT)
Dept: SURGERY | Facility: HOSPITAL | Age: OVER 89
End: 2025-02-12
Payer: MEDICARE

## 2025-02-12 NOTE — H&P
Zac Abdi MD   Physician  Specialty: SURGERY, ORTHOPEDIC     Progress Notes     Signed        Signed       Expand All Collapse All    NURSING INTAKE COMMENTS:        Chief Complaint   Patient presents with    Hip Pain       Here w/ the daughter- R hip f/u- rates pain 10/10 depends w/ certain movement- pt is taking gabapentin for the pain- here to discuss sx         HPI: This 89 year old female presents today with her daughter to discuss right hip replacement.  The cortisone injection earlier this year did not give enough relief.  She has persistent groin pain affecting ambulation.  The right leg is almost 3 cm shorter than the left.  She feels it as short.     She recently had a syncopal episode and is currently in a rehab facility.  Normally she lives in a house with her grandson but the daughter said they are working with [facility] to get in-house help most days.  The patient's mind remain sharp.  She is not on anticoagulation.  She is diabetic.     Past Medical History       Past Medical History:    Arthritis    Diabetes (HCC)    High blood pressure    High cholesterol    Hyperlipidemia    Osteoarthritis    Schatzki's ring    Unspecified essential hypertension    UTI (urinary tract infection)    Visual impairment         Past Surgical History         Past Surgical History:   Procedure Laterality Date    Anesth,shoulder replacement Left 11/02/16     DR LEWIS    Appendectomy        Appendectomy        Colonoscopy        D & c        Dilation/curettage,diagnostic        Egd   3/14         Current Medications          Current Outpatient Medications   Medication Sig Dispense Refill    lisinopril 20 MG Oral Tab          amLODIPine 5 MG Oral Tab Take 1 tablet (5 mg total) by mouth daily.        gabapentin 100 MG Oral Cap Take 1 capsule (100 mg total) by mouth 3 (three) times daily.        hydroCHLOROthiazide 12.5 MG Oral Tab Take 1 tablet (12.5 mg total) by mouth 3 (three) times a week. Take 1 on Monday,  Wednesday, ans Friday.        latanoprost 0.005 % Ophthalmic Solution Place into both eyes nightly.        LUTEIN OR Take 1 tablet by mouth daily.          MetFORMIN HCl  MG Oral Tablet 24 Hr Take 1 tablet (500 mg total) by mouth 3 (three) times daily.        LANTUS SOLOSTAR 100 UNIT/ML Subcutaneous Solution Pen-injector Inject 20 Units into the skin nightly.        Insulin Syringes, Disposable, U-100 0.5 ML Does not apply Misc          FreeStyle Lancets Does not apply Misc          Glucose Blood In Vitro Strip          Atorvastatin Calcium (LIPITOR) 40 MG Oral Tab Take 1 tablet (40 mg total) by mouth daily.        lisinopril (PRINIVIL,ZESTRIL) 40 MG Oral Tab Take 1 tablet (40 mg total) by mouth daily.        metoprolol succinate  MG Oral Tablet 24 Hr Take 1 tablet (100 mg total) by mouth daily.        Multiple Vitamins-Minerals (MULTI FOR HER 50+) Oral Cap Take 1 tablet by mouth.               [Allergies]    [Allergies]        Allergen Reactions    Bactrim [Sulfamethoxazole W/Trimethoprim] RASH       rash    Shellfish-Derived Products         Other reaction(s): (DO NOT USE, NOT SCREENED) SHELLFISH CONT PROD     Family History         Family History   Problem Relation Age of Onset    Diabetes Father      Stroke Father      Diabetes Mother      Cancer Brother           liver    Cancer Other           family h/o of prostate         No family Hx of DVT/PE     Social History            Occupational History    Not on file   Tobacco Use    Smoking status: Former       Current packs/day: 0.00       Types: Cigarettes       Quit date:        Years since quittin.8    Smokeless tobacco: Never   Substance and Sexual Activity    Alcohol use: Not Currently       Comment: occasionally    Drug use: No    Sexual activity: Not on file         Review of Systems:  GENERAL: feels generally well, no significant weight loss or weight gain  SKIN: no ulcerated or worrisome skin lesions  EYES:denies blurred vision or  double vision  HEENT: denies new nasal congestion, sinus pain or ST  LUNGS: denies shortness of breath  CARDIOVASCULAR: denies chest pain  GI: no hematemesis, no worsening heartburn, no diarrhea  : no dysuria, no blood in urine, no difficulty urinating, no incontinence  MUSCULOSKELETAL: no other musculoskeletal complaints other than in HPI  NEURO: no numbness or tingling, no weakness or balance disorder  PSYCHE: no depression or anxiety  HEMATOLOGIC: no hx of blood dyscrasia, no Hx DVT/PE  ENDOCRINE: no thyroid or diabetes issues  ALL/ASTHMA: no new hx of severe allergy or asthma     Physical Examination:    There were no vitals taken for this visit.  Constitutional: appears well hydrated, alert and responsive, no acute distress noted  Extremities: Right lower extremity 3 cm shorter than the left.  Healthy skin on the thigh and leg.  Very minimal groin rash.  No overhanging abdomen.  Healthy skin in the presumed incision area.  Musculoskeletal: Limited internal extra rotation with groin pain.  Left hip within normal limits.  Neurological: Normal motor and sensory bilateral lower extremities and she can do an active straight leg raise.     Imaging: X-rays show end-stage osteoarthritis right hip.        No results found.            Lab Results   Component Value Date     WBC 5.4 10/02/2024     HGB 14.2 10/02/2024     .0 10/02/2024            Lab Results   Component Value Date      (H) 10/02/2024     BUN 15 10/02/2024     CREATSERUM 0.91 10/02/2024     GFRNAA 49 (L) 04/10/2022     GFRAA 57 (L) 04/10/2022         Assessment and Plan:  Diagnoses and all orders for this visit:     Primary osteoarthritis of right hip     Right hip pain           Assessment: Refractory right hip pain from osteoarthritis in an independent 89-year-old.     Plan: I discussed that while hip replacement has risks, I think it is reasonable to proceed with her given that her mind is sharp and she remains independent.  I told her we  can lengthen the right leg somewhat but not a lot.     I told her there are risks with surgery such as death, heart attack, stroke, bleeding, infection, blood clot, nerve injury, artery injury, leg length discrepancy, fracture, dislocation, numbness laterally incision, loosening, revision surgery, and persistent pain despite surgery.  She and her daughter asked questions which were answered to their satisfaction.  They wish to proceed.  They said they have thought about this quite a bit and want to proceed with hip replacement.     The plan will be an anterior right hip replacement using cemented stem and fluoroscopy.  Needs medical and dental clearance.     Follow Up: No follow-ups on file.     Zac Abdi MD

## 2025-02-13 ENCOUNTER — APPOINTMENT (OUTPATIENT)
Dept: GENERAL RADIOLOGY | Facility: HOSPITAL | Age: OVER 89
End: 2025-02-13
Attending: ORTHOPAEDIC SURGERY
Payer: MEDICARE

## 2025-02-13 ENCOUNTER — HOSPITAL ENCOUNTER (OUTPATIENT)
Facility: HOSPITAL | Age: OVER 89
LOS: 1 days | Discharge: HOME HEALTH | End: 2025-02-17
Attending: ORTHOPAEDIC SURGERY
Payer: MEDICARE

## 2025-02-13 ENCOUNTER — ANESTHESIA (OUTPATIENT)
Dept: SURGERY | Facility: HOSPITAL | Age: OVER 89
End: 2025-02-13
Payer: MEDICARE

## 2025-02-13 DIAGNOSIS — Z96.641 S/P HIP REPLACEMENT, RIGHT: ICD-10-CM

## 2025-02-13 DIAGNOSIS — M16.11 PRIMARY OSTEOARTHRITIS OF RIGHT HIP: Primary | ICD-10-CM

## 2025-02-13 LAB
ANTIBODY SCREEN: NEGATIVE
GLUCOSE BLDC GLUCOMTR-MCNC: 189 MG/DL (ref 70–99)
GLUCOSE BLDC GLUCOMTR-MCNC: 231 MG/DL (ref 70–99)
GLUCOSE BLDC GLUCOMTR-MCNC: 250 MG/DL (ref 70–99)
GLUCOSE BLDC GLUCOMTR-MCNC: 252 MG/DL (ref 70–99)
RH BLOOD TYPE: POSITIVE
RH BLOOD TYPE: POSITIVE

## 2025-02-13 PROCEDURE — 73502 X-RAY EXAM HIP UNI 2-3 VIEWS: CPT | Performed by: ORTHOPAEDIC SURGERY

## 2025-02-13 PROCEDURE — P9045 ALBUMIN (HUMAN), 5%, 250 ML: HCPCS | Performed by: ANESTHESIOLOGY

## 2025-02-13 PROCEDURE — 99214 OFFICE O/P EST MOD 30 MIN: CPT | Performed by: HOSPITALIST

## 2025-02-13 PROCEDURE — 0SR90J9 REPLACEMENT OF RIGHT HIP JOINT WITH SYNTHETIC SUBSTITUTE, CEMENTED, OPEN APPROACH: ICD-10-PCS | Performed by: ORTHOPAEDIC SURGERY

## 2025-02-13 PROCEDURE — 76000 FLUOROSCOPY <1 HR PHYS/QHP: CPT | Performed by: ORTHOPAEDIC SURGERY

## 2025-02-13 DEVICE — AMISTEM C CEMENTED STEM STANDARD SIZE 5
Type: IMPLANTABLE DEVICE | Site: HIP | Status: FUNCTIONAL
Brand: AMISTEM C FEMORAL STEMS

## 2025-02-13 DEVICE — DOUBLE MOBILITY ACETABULAR SHELL Ø50
Type: IMPLANTABLE DEVICE | Site: HIP | Status: FUNCTIONAL
Brand: MPACT DOUBLE MOBILITY SHELLS

## 2025-02-13 DEVICE — FEMORAL HEAD Ø 28 SIZE M
Type: IMPLANTABLE DEVICE | Site: HIP | Status: FUNCTIONAL
Brand: MECTACER BIOLOX DELTA FEMORAL BALL HEAD

## 2025-02-13 DEVICE — IMPLANTABLE DEVICE
Type: IMPLANTABLE DEVICE | Site: HIP | Status: FUNCTIONAL
Brand: REFOBACIN® BONE CEMENT R

## 2025-02-13 DEVICE — BONE PREPARATION KIT
Type: IMPLANTABLE DEVICE | Site: HIP | Status: FUNCTIONAL
Brand: BIOPREP

## 2025-02-13 DEVICE — DUAL MOBILITY UPCHARGE: Type: IMPLANTABLE DEVICE | Site: HIP

## 2025-02-13 DEVICE — HIP REPLACEMENT WITH CERAMIC HEAD: Type: IMPLANTABLE DEVICE | Site: HIP

## 2025-02-13 DEVICE — HC PE LINER 28 / DME
Type: IMPLANTABLE DEVICE | Site: HIP | Status: FUNCTIONAL
Brand: DOUBLE MOBILITY LINER

## 2025-02-13 RX ORDER — NICOTINE POLACRILEX 4 MG
15 LOZENGE BUCCAL
Status: DISCONTINUED | OUTPATIENT
Start: 2025-02-13 | End: 2025-02-17

## 2025-02-13 RX ORDER — AMLODIPINE BESYLATE 5 MG/1
5 TABLET ORAL DAILY
Status: DISCONTINUED | OUTPATIENT
Start: 2025-02-13 | End: 2025-02-17

## 2025-02-13 RX ORDER — ONDANSETRON 2 MG/ML
INJECTION INTRAMUSCULAR; INTRAVENOUS AS NEEDED
Status: DISCONTINUED | OUTPATIENT
Start: 2025-02-13 | End: 2025-02-13 | Stop reason: SURG

## 2025-02-13 RX ORDER — SODIUM CHLORIDE, SODIUM LACTATE, POTASSIUM CHLORIDE, CALCIUM CHLORIDE 600; 310; 30; 20 MG/100ML; MG/100ML; MG/100ML; MG/100ML
INJECTION, SOLUTION INTRAVENOUS CONTINUOUS
Status: DISCONTINUED | OUTPATIENT
Start: 2025-02-13 | End: 2025-02-13 | Stop reason: HOSPADM

## 2025-02-13 RX ORDER — DOCUSATE SODIUM 100 MG/1
100 CAPSULE, LIQUID FILLED ORAL 2 TIMES DAILY
Status: DISCONTINUED | OUTPATIENT
Start: 2025-02-13 | End: 2025-02-17

## 2025-02-13 RX ORDER — MORPHINE SULFATE 1 MG/ML
INJECTION, SOLUTION EPIDURAL; INTRATHECAL; INTRAVENOUS
Status: COMPLETED | OUTPATIENT
Start: 2025-02-13 | End: 2025-02-13

## 2025-02-13 RX ORDER — HYDROCHLOROTHIAZIDE 12.5 MG/1
12.5 TABLET ORAL
Status: DISCONTINUED | OUTPATIENT
Start: 2025-02-14 | End: 2025-02-17

## 2025-02-13 RX ORDER — PHENYLEPHRINE HCL 10 MG/ML
VIAL (ML) INJECTION AS NEEDED
Status: DISCONTINUED | OUTPATIENT
Start: 2025-02-13 | End: 2025-02-13 | Stop reason: SURG

## 2025-02-13 RX ORDER — HYDROCODONE BITARTRATE AND ACETAMINOPHEN 5; 325 MG/1; MG/1
1 TABLET ORAL EVERY 6 HOURS PRN
Status: DISCONTINUED | OUTPATIENT
Start: 2025-02-13 | End: 2025-02-17

## 2025-02-13 RX ORDER — POLYETHYLENE GLYCOL 3350 17 G/17G
17 POWDER, FOR SOLUTION ORAL DAILY PRN
Status: DISCONTINUED | OUTPATIENT
Start: 2025-02-13 | End: 2025-02-17

## 2025-02-13 RX ORDER — DIPHENHYDRAMINE HCL 25 MG
25 CAPSULE ORAL EVERY 4 HOURS PRN
Status: DISCONTINUED | OUTPATIENT
Start: 2025-02-13 | End: 2025-02-17

## 2025-02-13 RX ORDER — LISINOPRIL 40 MG/1
40 TABLET ORAL DAILY
Status: DISCONTINUED | OUTPATIENT
Start: 2025-02-13 | End: 2025-02-17

## 2025-02-13 RX ORDER — SODIUM CHLORIDE 9 MG/ML
INJECTION, SOLUTION INTRAVENOUS CONTINUOUS
Status: DISCONTINUED | OUTPATIENT
Start: 2025-02-13 | End: 2025-02-17

## 2025-02-13 RX ORDER — NICOTINE POLACRILEX 4 MG
15 LOZENGE BUCCAL
Status: DISCONTINUED | OUTPATIENT
Start: 2025-02-13 | End: 2025-02-13 | Stop reason: HOSPADM

## 2025-02-13 RX ORDER — ALBUMIN HUMAN 50 G/1000ML
SOLUTION INTRAVENOUS CONTINUOUS PRN
Status: DISCONTINUED | OUTPATIENT
Start: 2025-02-13 | End: 2025-02-13 | Stop reason: SURG

## 2025-02-13 RX ORDER — METOPROLOL TARTRATE 25 MG/1
25 TABLET, FILM COATED ORAL ONCE AS NEEDED
Status: DISCONTINUED | OUTPATIENT
Start: 2025-02-13 | End: 2025-02-13 | Stop reason: HOSPADM

## 2025-02-13 RX ORDER — ACETAMINOPHEN 325 MG/1
650 TABLET ORAL EVERY 8 HOURS PRN
Status: DISCONTINUED | OUTPATIENT
Start: 2025-02-13 | End: 2025-02-17

## 2025-02-13 RX ORDER — INSULIN DEGLUDEC 100 U/ML
20 INJECTION, SOLUTION SUBCUTANEOUS NIGHTLY
Status: DISCONTINUED | OUTPATIENT
Start: 2025-02-13 | End: 2025-02-17

## 2025-02-13 RX ORDER — DIPHENHYDRAMINE HYDROCHLORIDE 50 MG/ML
12.5 INJECTION INTRAMUSCULAR; INTRAVENOUS EVERY 4 HOURS PRN
Status: DISCONTINUED | OUTPATIENT
Start: 2025-02-13 | End: 2025-02-17

## 2025-02-13 RX ORDER — SODIUM PHOSPHATE, DIBASIC AND SODIUM PHOSPHATE, MONOBASIC 7; 19 G/230ML; G/230ML
1 ENEMA RECTAL ONCE AS NEEDED
Status: DISCONTINUED | OUTPATIENT
Start: 2025-02-13 | End: 2025-02-17

## 2025-02-13 RX ORDER — BISACODYL 10 MG
10 SUPPOSITORY, RECTAL RECTAL
Status: DISCONTINUED | OUTPATIENT
Start: 2025-02-13 | End: 2025-02-17

## 2025-02-13 RX ORDER — NAPROXEN 250 MG/1
250 TABLET ORAL 2 TIMES DAILY WITH MEALS
Status: DISCONTINUED | OUTPATIENT
Start: 2025-02-13 | End: 2025-02-17

## 2025-02-13 RX ORDER — NALOXONE HYDROCHLORIDE 0.4 MG/ML
0.08 INJECTION, SOLUTION INTRAMUSCULAR; INTRAVENOUS; SUBCUTANEOUS AS NEEDED
Status: DISCONTINUED | OUTPATIENT
Start: 2025-02-13 | End: 2025-02-13 | Stop reason: HOSPADM

## 2025-02-13 RX ORDER — DIPHENHYDRAMINE HYDROCHLORIDE 50 MG/ML
25 INJECTION INTRAMUSCULAR; INTRAVENOUS ONCE AS NEEDED
Status: ACTIVE | OUTPATIENT
Start: 2025-02-13 | End: 2025-02-13

## 2025-02-13 RX ORDER — DOXEPIN HYDROCHLORIDE 50 MG/1
1 CAPSULE ORAL DAILY
Status: DISCONTINUED | OUTPATIENT
Start: 2025-02-13 | End: 2025-02-17

## 2025-02-13 RX ORDER — METOPROLOL SUCCINATE 100 MG/1
100 TABLET, EXTENDED RELEASE ORAL DAILY
Status: DISCONTINUED | OUTPATIENT
Start: 2025-02-14 | End: 2025-02-17

## 2025-02-13 RX ORDER — SODIUM CHLORIDE, SODIUM LACTATE, POTASSIUM CHLORIDE, CALCIUM CHLORIDE 600; 310; 30; 20 MG/100ML; MG/100ML; MG/100ML; MG/100ML
INJECTION, SOLUTION INTRAVENOUS CONTINUOUS
Status: DISCONTINUED | OUTPATIENT
Start: 2025-02-13 | End: 2025-02-13

## 2025-02-13 RX ORDER — SENNOSIDES 8.6 MG
17.2 TABLET ORAL NIGHTLY
Status: DISCONTINUED | OUTPATIENT
Start: 2025-02-13 | End: 2025-02-17

## 2025-02-13 RX ORDER — ENOXAPARIN SODIUM 100 MG/ML
30 INJECTION SUBCUTANEOUS ONCE
Status: COMPLETED | OUTPATIENT
Start: 2025-02-13 | End: 2025-02-13

## 2025-02-13 RX ORDER — VANCOMYCIN HYDROCHLORIDE 1 G/20ML
INJECTION, POWDER, LYOPHILIZED, FOR SOLUTION INTRAVENOUS AS NEEDED
Status: DISCONTINUED | OUTPATIENT
Start: 2025-02-13 | End: 2025-02-13 | Stop reason: HOSPADM

## 2025-02-13 RX ORDER — ONDANSETRON 2 MG/ML
4 INJECTION INTRAMUSCULAR; INTRAVENOUS EVERY 6 HOURS PRN
Status: DISCONTINUED | OUTPATIENT
Start: 2025-02-13 | End: 2025-02-17

## 2025-02-13 RX ORDER — NICOTINE POLACRILEX 4 MG
30 LOZENGE BUCCAL
Status: DISCONTINUED | OUTPATIENT
Start: 2025-02-13 | End: 2025-02-13 | Stop reason: HOSPADM

## 2025-02-13 RX ORDER — EPHEDRINE SULFATE 50 MG/ML
INJECTION INTRAVENOUS AS NEEDED
Status: DISCONTINUED | OUTPATIENT
Start: 2025-02-13 | End: 2025-02-13 | Stop reason: SURG

## 2025-02-13 RX ORDER — DEXTROSE MONOHYDRATE 25 G/50ML
50 INJECTION, SOLUTION INTRAVENOUS
Status: DISCONTINUED | OUTPATIENT
Start: 2025-02-13 | End: 2025-02-17

## 2025-02-13 RX ORDER — ACETAMINOPHEN 500 MG
1000 TABLET ORAL ONCE
Status: COMPLETED | OUTPATIENT
Start: 2025-02-13 | End: 2025-02-13

## 2025-02-13 RX ORDER — ATORVASTATIN CALCIUM 40 MG/1
40 TABLET, FILM COATED ORAL DAILY
Status: DISCONTINUED | OUTPATIENT
Start: 2025-02-13 | End: 2025-02-17

## 2025-02-13 RX ORDER — GABAPENTIN 100 MG/1
100 CAPSULE ORAL 3 TIMES DAILY
Status: DISCONTINUED | OUTPATIENT
Start: 2025-02-13 | End: 2025-02-17

## 2025-02-13 RX ORDER — BUPIVACAINE HYDROCHLORIDE 7.5 MG/ML
INJECTION, SOLUTION INTRASPINAL
Status: COMPLETED | OUTPATIENT
Start: 2025-02-13 | End: 2025-02-13

## 2025-02-13 RX ORDER — TRANEXAMIC ACID 10 MG/ML
INJECTION, SOLUTION INTRAVENOUS AS NEEDED
Status: DISCONTINUED | OUTPATIENT
Start: 2025-02-13 | End: 2025-02-13 | Stop reason: SURG

## 2025-02-13 RX ORDER — DEXTROSE MONOHYDRATE 25 G/50ML
50 INJECTION, SOLUTION INTRAVENOUS
Status: DISCONTINUED | OUTPATIENT
Start: 2025-02-13 | End: 2025-02-13 | Stop reason: HOSPADM

## 2025-02-13 RX ORDER — ASPIRIN 325 MG
325 TABLET, DELAYED RELEASE (ENTERIC COATED) ORAL 2 TIMES DAILY
Status: DISCONTINUED | OUTPATIENT
Start: 2025-02-13 | End: 2025-02-17

## 2025-02-13 RX ORDER — METOCLOPRAMIDE HYDROCHLORIDE 5 MG/ML
10 INJECTION INTRAMUSCULAR; INTRAVENOUS EVERY 8 HOURS PRN
Status: DISCONTINUED | OUTPATIENT
Start: 2025-02-13 | End: 2025-02-17

## 2025-02-13 RX ORDER — ONDANSETRON 2 MG/ML
4 INJECTION INTRAMUSCULAR; INTRAVENOUS EVERY 6 HOURS PRN
Status: DISCONTINUED | OUTPATIENT
Start: 2025-02-13 | End: 2025-02-13 | Stop reason: HOSPADM

## 2025-02-13 RX ORDER — NICOTINE POLACRILEX 4 MG
30 LOZENGE BUCCAL
Status: DISCONTINUED | OUTPATIENT
Start: 2025-02-13 | End: 2025-02-17

## 2025-02-13 RX ADMIN — MORPHINE SULFATE 0.1 MG: 1 INJECTION, SOLUTION EPIDURAL; INTRATHECAL; INTRAVENOUS at 07:37:00

## 2025-02-13 RX ADMIN — PHENYLEPHRINE HCL 100 MCG: 10 MG/ML VIAL (ML) INJECTION at 07:40:00

## 2025-02-13 RX ADMIN — ALBUMIN HUMAN: 50 SOLUTION INTRAVENOUS at 08:08:00

## 2025-02-13 RX ADMIN — PHENYLEPHRINE HCL 100 MCG: 10 MG/ML VIAL (ML) INJECTION at 07:48:00

## 2025-02-13 RX ADMIN — PHENYLEPHRINE HCL 100 MCG: 10 MG/ML VIAL (ML) INJECTION at 08:10:00

## 2025-02-13 RX ADMIN — ONDANSETRON 4 MG: 2 INJECTION INTRAMUSCULAR; INTRAVENOUS at 09:17:00

## 2025-02-13 RX ADMIN — PHENYLEPHRINE HCL 100 MCG: 10 MG/ML VIAL (ML) INJECTION at 08:22:00

## 2025-02-13 RX ADMIN — TRANEXAMIC ACID 1000 MG: 10 INJECTION, SOLUTION INTRAVENOUS at 10:06:00

## 2025-02-13 RX ADMIN — PHENYLEPHRINE HCL 100 MCG: 10 MG/ML VIAL (ML) INJECTION at 07:44:00

## 2025-02-13 RX ADMIN — TRANEXAMIC ACID 1000 MG: 10 INJECTION, SOLUTION INTRAVENOUS at 08:04:00

## 2025-02-13 RX ADMIN — PHENYLEPHRINE HCL 100 MCG: 10 MG/ML VIAL (ML) INJECTION at 07:55:00

## 2025-02-13 RX ADMIN — PHENYLEPHRINE HCL 100 MCG: 10 MG/ML VIAL (ML) INJECTION at 08:16:00

## 2025-02-13 RX ADMIN — BUPIVACAINE HYDROCHLORIDE 1.4 ML: 7.5 INJECTION, SOLUTION INTRASPINAL at 07:37:00

## 2025-02-13 RX ADMIN — SODIUM CHLORIDE, SODIUM LACTATE, POTASSIUM CHLORIDE, CALCIUM CHLORIDE: 600; 310; 30; 20 INJECTION, SOLUTION INTRAVENOUS at 08:29:00

## 2025-02-13 RX ADMIN — EPHEDRINE SULFATE 10 MG: 50 INJECTION INTRAVENOUS at 07:53:00

## 2025-02-13 RX ADMIN — PHENYLEPHRINE HCL 100 MCG: 10 MG/ML VIAL (ML) INJECTION at 08:01:00

## 2025-02-13 RX ADMIN — EPHEDRINE SULFATE 10 MG: 50 INJECTION INTRAVENOUS at 08:04:00

## 2025-02-13 NOTE — ANESTHESIA PREPROCEDURE EVALUATION
Anesthesia PreOp Note    HPI:     Rossy Cano is a 89 year old female who presents for preoperative consultation requested by: Zac Abdi MD    Date of Surgery: 2/13/2025    Procedure(s):  Anterior right hip arthroplasty with fluoroscopy  Indication: Primary osteoarthritis of right hip [M16.11]    Relevant Problems   No relevant active problems       NPO:  Last Liquid Consumption Date: 02/13/25  Last Liquid Consumption Time: 0500 (water with meds)  Last Solid Consumption Date: 02/12/25  Last Solid Consumption Time: 1800  Last Liquid Consumption Date: 02/13/25          History Review:  Patient Active Problem List    Diagnosis Date Noted    Acute hypoxemic respiratory failure (HCC) 10/01/2024    Syncope and collapse 10/01/2024    Nausea and vomiting in adult 10/01/2024    Aspiration pneumonitis (HCC) 10/01/2024    Hypoxemia 10/01/2024    Weakness generalized 01/04/2023    Acute cystitis without hematuria 01/04/2023    Hyperglycemia 01/04/2023    Hypoxia 04/24/2020    Community acquired pneumonia of left lower lobe of lung 04/23/2020    Primary osteoarthritis, left shoulder 11/02/2016    Diabetes type 2, controlled (HCC) 11/02/2016    Hypertension 11/02/2016    Dyslipidemia 11/02/2016    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    Recurrent UTI 09/24/2014    Microhematuria 09/24/2014       Past Medical History:    Arthritis    Diabetes (HCC)    High blood pressure    High cholesterol    Hx of motion sickness    Hyperlipidemia    Incontinence    Osteoarthritis    Schatzki's ring    Unspecified essential hypertension    UTI (urinary tract infection)    Visual impairment       Past Surgical History:   Procedure Laterality Date    Anesth,shoulder replacement Left 11/02/16    DR LEWIS    Appendectomy      Appendectomy      Colonoscopy      D & c      Dilation/curettage,diagnostic      Egd  3/14       Prescriptions Prior to Admission[1]  Current Medications and Prescriptions Ordered in Epic[2]    Allergies[3]    Family History    Problem Relation Age of Onset    Diabetes Father     Stroke Father     Diabetes Mother     Cancer Brother         liver    Cancer Other         family h/o of prostate     Social History     Socioeconomic History    Marital status:    Tobacco Use    Smoking status: Former     Current packs/day: 0.00     Types: Cigarettes     Quit date:      Years since quittin.1    Smokeless tobacco: Never   Vaping Use    Vaping status: Never Used   Substance and Sexual Activity    Alcohol use: Not Currently     Comment: occasionally    Drug use: No       Available pre-op labs reviewed.     Lab Results   Component Value Date    PGLU 189 (H) 2025          Vital Signs:  Body mass index is 27.44 kg/m².   height is 1.676 m (5' 6\") and weight is 77.1 kg (170 lb). Her oral temperature is 98.1 °F (36.7 °C). Her blood pressure is 123/60 and her pulse is 78. Her respiration is 16 and oxygen saturation is 92%.   Vitals:    25 0958 25 0621   BP:  123/60   Pulse:  78   Resp:  16   Temp:  98.1 °F (36.7 °C)   TempSrc:  Oral   SpO2:  92%   Weight: 78.9 kg (174 lb) 77.1 kg (170 lb)   Height: 1.676 m (5' 6\")         Anesthesia Evaluation     Patient summary reviewed and Nursing notes reviewed    Airway   Mallampati: III  TM distance: <3 FB  Neck ROM: limited  Dental      Pulmonary     breath sounds clear to auscultation  Cardiovascular   (+) hypertension    ECG reviewed  Rhythm: regular  Rate: normal    Neuro/Psych      GI/Hepatic/Renal      Endo/Other    (+) diabetes mellitus  Abdominal                  Anesthesia Plan:   ASA:  3  Plan:   Spinal  Informed Consent Plan and Risks Discussed With:  Patient and child/children  Discussed plan with:  Attending      I have informed Rossy Cano and/or legal guardian or family member of the nature of the anesthetic plan, benefits, risks including possible dental damage if relevant, major complications, and any alternative forms of anesthetic management.   All of the  patient's questions were answered to the best of my ability. The patient desires the anesthetic management as planned.  Adeola Valdez MD  2/13/2025 7:34 AM  Present on Admission:  **None**           [1]   Medications Prior to Admission   Medication Sig Dispense Refill Last Dose/Taking    CRANBERRY OR Take 1 tablet by mouth daily.   2/12/2025    LYUMJEV KWIKPEN 100 UNIT/ML Subcutaneous Solution Pen-injector Inject 5 Units into the skin in the morning, at noon, and at bedtime.   2/12/2025    amLODIPine 5 MG Oral Tab Take 1 tablet (5 mg total) by mouth daily.   2/13/2025 Morning    gabapentin 100 MG Oral Cap Take 1 capsule (100 mg total) by mouth 3 (three) times daily.   2/13/2025 Morning    hydroCHLOROthiazide 12.5 MG Oral Tab Take 1 tablet (12.5 mg total) by mouth 3 (three) times a week. Take 1 on Monday, Wednesday, ans Friday.   2/12/2025    LUTEIN OR Take 1 tablet by mouth daily.     2/12/2025    MetFORMIN HCl  MG Oral Tablet 24 Hr Take 1 tablet (500 mg total) by mouth 2 (two) times daily with meals.   2/12/2025    LANTUS SOLOSTAR 100 UNIT/ML Subcutaneous Solution Pen-injector Inject 20 Units into the skin nightly.   2/12/2025    Atorvastatin Calcium (LIPITOR) 40 MG Oral Tab Take 1 tablet (40 mg total) by mouth daily.   2/12/2025    lisinopril (PRINIVIL,ZESTRIL) 40 MG Oral Tab Take 1 tablet (40 mg total) by mouth daily.   2/12/2025    metoprolol succinate  MG Oral Tablet 24 Hr Take 1 tablet (100 mg total) by mouth daily.   2/13/2025 Morning    Multiple Vitamins-Minerals (MULTI FOR HER 50+) Oral Cap Take 1 tablet by mouth.     Taking    Insulin Syringes, Disposable, U-100 0.5 ML Does not apply Misc        FreeStyle Lancets Does not apply Misc        Glucose Blood In Vitro Strip       [2]   Current Facility-Administered Medications Ordered in Epic   Medication Dose Route Frequency Provider Last Rate Last Admin    lactated ringers infusion   Intravenous Continuous Zac Abdi MD 20 mL/hr at  02/13/25 0615 New Bag at 02/13/25 0615    [Transfer Hold] metoprolol tartrate (Lopressor) tab 25 mg  25 mg Oral Once PRN Zac Abdi MD        ceFAZolin (Ancef) 2g in 10mL IV syringe premix  2 g Intravenous Once Zac Abdi MD        clonidine-EPINEPHrine-ropivacaine-ketorolac (CERTS) (Duraclon-Adrenalin-Naropin-Toradol) pain cocktail irrigation   Intra-articular Once (Intra-Op) Zac Abdi MD         No current Epic-ordered outpatient medications on file.   [3]   Allergies  Allergen Reactions    Bactrim [Sulfamethoxazole W/Trimethoprim] RASH     rash

## 2025-02-13 NOTE — CONSULTS
[facility]    PATIENT'S NAME: GEORGIA STRONG   ATTENDING PHYSICIAN: Zac Abdi MD   CONSULTING PHYSICIAN: Azra Grewal MD   PATIENT ACCOUNT#:   [id number]    LOCATION:  [address]  MEDICAL RECORD #:   [id number]       YOB: 1933  ADMISSION DATE:       02/13/2025      CONSULT DATE:  02/13/2025    REPORT OF CONSULTATION      REASON FOR ADMISSION:  Post right total hip arthroplasty.    HISTORY OF PRESENT ILLNESS:  Patient is a 89-year-old  female with chronic right hip pain and underlying severe primary osteoarthritis, failed outpatient conservative medical management options.  Scheduled today for above-mentioned procedure by her orthopedic surgeon, Dr. Abdi.  Preoperatively, had spinal block.  Postoperatively, transferred to PACU for further monitoring.      PAST MEDICAL HISTORY:  Generalized osteoarthritis, diabetes mellitus type 2, hyperlipidemia, hypertension.    PAST SURGICAL HISTORY:  Appendectomy, D and C procedure, left total shoulder arthroplasty.    MEDICATIONS:  Please see medication reconciliation list.     ALLERGIES:  Bactrim.    SOCIAL HISTORY:  Ex-tobacco user.  No current tobacco, alcohol, or drug use.  Independent for basic activities of daily living.     FAMILY HISTORY:  Father had diabetes mellitus type 2, cerebrovascular accident.  Mother had diabetes mellitus type 2.    REVIEW OF SYSTEMS:  Currently resting in bed.  No right hip pain.  No chest pain.  No shortness of breath.  Other 12-point review of systems is negative.       PHYSICAL EXAMINATION:    GENERAL:  Alert and oriented to time, place and person.  No acute distress.   VITAL SIGNS:  Temperature 97.3, pulse 62, respiratory rate 19, blood pressure 97/53, pulse ox 94% on room air.  HEENT:  Atraumatic.  Oropharynx clear.  Moist mucous membranes.  Ears and nose normal.  Eyes:  Anicteric sclerae.   NECK:  Supple.  No lymphadenopathy.  Trachea midline.  Full range of motion.   LUNGS:   Clear to auscultation bilaterally.  Normal respiratory effort.    HEART:  Regular rate and rhythm.  S1 and S2 auscultated.  No murmur.    ABDOMEN:  Soft, nondistended.  No tenderness.  Positive bowel sounds.   EXTREMITIES:  Right anterior hip dressing.  Prevena wound VAC.  No leg edema.  NEUROLOGIC:  Decreased sensation and muscle movement in both lower extremities, post spinal block.  No other focal findings.    ASSESSMENT AND PLAN:    1.   Right hip primary osteoarthritis, status post right total hip arthroplasty, anterior approach.  Pain control.  Monitor surgical wound and drain.  Spinal block.  Neurovascular checks.  Aspirin for DVT prophylaxis.  Physical and occupational therapy.    2.   Diabetes mellitus type 2.  Continue home medications.  Monitor Accu-Cheks.  Sliding scale insulin.  3.   Essential hypertension.  Continue home medications and monitor.  4.   Hyperlipidemia.  Continue home medications.     Dictated By Azra Grewal MD  d: 02/13/2025 11:13:17  t: 02/13/2025 12:38:26  Job [id number]  FB/

## 2025-02-13 NOTE — ANESTHESIA PROCEDURE NOTES
Spinal Block    Date/Time: 2/13/2025 7:37 AM    Performed by: Adeola Valdez MD  Authorized by: Adeola Valdez MD      General Information and Staff    Start Time:  2/13/2025 7:37 AM  End Time:  2/13/2025 7:41 AM  Anesthesiologist:  Adeola Valdez MD  Performed by:  Anesthesiologist  Patient Location:  OR  Site identification: surface landmarks    Preanesthetic Checklist: patient identified, IV checked, risks and benefits discussed, monitors and equipment checked, pre-op evaluation, timeout performed, anesthesia consent and sterile technique used      Procedure Details    Patient Position:  Sitting  Prep: ChloraPrep    Monitoring:  Cardiac monitor, heart rate and continuous pulse ox  Approach:  Midline  Location:  L3-4  Injection Technique:  Single-shot    Needle    Needle Type:  Sprotte  Needle Gauge:  24 G  Needle Length:  3.5 in    Assessment    Sensory Level:  T4  Events: clear CSF, CSF aspirated, well tolerated and blood negative      Additional Comments

## 2025-02-13 NOTE — PLAN OF CARE
Home with grandson. Baseline ambulates with RW. POD 0. Prevena in place. Alert. 3L NC. PONV. Denies pain. Arcos in place. Baseline incontinent of bladder. ASA, lovenox, scds for DVT prophylaxis. ACHS. Plan for PT/OT. Call light within reach, bed locked and in lowest position.     Problem: Patient Centered Care  Goal: Patient preferences are identified and integrated in the patient's plan of care  Description: Interventions:  - What would you like us to know as we care for you?   - Provide timely, complete, and accurate information to patient/family  - Incorporate patient and family knowledge, values, beliefs, and cultural backgrounds into the planning and delivery of care  - Encourage patient/family to participate in care and decision-making at the level they choose  - Honor patient and family perspectives and choices  Outcome: Progressing

## 2025-02-13 NOTE — INTERVAL H&P NOTE
Pre-op Diagnosis: Primary osteoarthritis of right hip [M16.11]    The above referenced H&P was reviewed by Zac Abdi MD on 2/13/2025, the patient was examined and no significant changes have occurred in the patient's condition since the H&P was performed.  I discussed with the patient and/or legal representative the potential benefits, risks and side effects of this procedure; the likelihood of the patient achieving goals; and potential problems that might occur during recuperation.  I discussed reasonable alternatives to the procedure, including risks, benefits and side effects related to the alternatives and risks related to not receiving this procedure.  We will proceed with procedure as planned.

## 2025-02-13 NOTE — BRIEF OP NOTE
Pre-Operative Diagnosis: Primary osteoarthritis of right hip [M16.11]     Post-Operative Diagnosis: Primary osteoarthritis of right hip     Procedure Performed:   Anterior right hip arthroplasty with fluoroscopy    Surgeons and Role:     * Zac Abdi MD - Primary    Assistant(s): Meghann Mccoy PA-C (first assistant); Tal Luque CSA (second assistant)    Anesthesia: Spinal Duramorph/MAC by Dr. Valdez     Surgical Findings: Medacta Dual Mobility cup Mpact 50 mm, stem AMIS polished cemented size 5 standard neck offset, Biolox ceramic femoral head 28 mm with 0 mm femoral neck, highly cross-linked Dual Mobility polyethylene  Drain: None  Specimen: Right femoral head to pathology  Complications: None  Estimated Blood Loss: 250 cc  Stable to PACU.  Tranexamic acid 1 g IV second dose given in OR at end of case.    Zac Abdi MD  2/13/2025  7:48 AM

## 2025-02-13 NOTE — ANESTHESIA POSTPROCEDURE EVALUATION
Patient: Rossy Cano    Procedure Summary       Date: 02/13/25 Room / Location: [facility] MAIN OR [address] [place] [address] MAIN OR    Anesthesia Start: 0732 Anesthesia Stop: 1050    Procedure: Anterior right hip arthroplasty with fluoroscopy (Right: Hip) Diagnosis:       Primary osteoarthritis of right hip      (Primary osteoarthritis of right hip [M16.11])    Surgeons: Zac Abdi MD Anesthesiologist: Adeola Valdez MD    Anesthesia Type: spinal, MAC ASA Status: 3            Anesthesia Type: spinal, MAC    Vitals Value Taken Time   /55 02/13/25 1208   Temp 97.6 °F (36.4 °C) 02/13/25 1155   Pulse 53 02/13/25 1208   Resp 14 02/13/25 1208   SpO2 94 % 02/13/25 1208       [facility] AN Post Evaluation:   Patient Evaluated in PACU  Patient Participation: complete - patient participated  Level of Consciousness: awake  Pain Score: 0  Pain Management: adequate  Airway Patency:  Dental exam unchanged from preop  Yes    Nausea/Vomiting: none  Respiratory Status: acceptable  Postoperative Hydration acceptable      Adeola Valdez MD  2/13/2025 12:13 PM

## 2025-02-14 LAB
ANION GAP SERPL CALC-SCNC: 5 MMOL/L (ref 0–18)
BUN BLD-MCNC: 17 MG/DL (ref 9–23)
BUN/CREAT SERPL: 23.6 (ref 10–20)
CALCIUM BLD-MCNC: 8.4 MG/DL (ref 8.7–10.4)
CHLORIDE SERPL-SCNC: 107 MMOL/L (ref 98–112)
CO2 SERPL-SCNC: 28 MMOL/L (ref 21–32)
CREAT BLD-MCNC: 0.72 MG/DL
EGFRCR SERPLBLD CKD-EPI 2021: 79 ML/MIN/1.73M2 (ref 60–?)
GLUCOSE BLD-MCNC: 124 MG/DL (ref 70–99)
GLUCOSE BLDC GLUCOMTR-MCNC: 111 MG/DL (ref 70–99)
GLUCOSE BLDC GLUCOMTR-MCNC: 170 MG/DL (ref 70–99)
GLUCOSE BLDC GLUCOMTR-MCNC: 225 MG/DL (ref 70–99)
GLUCOSE BLDC GLUCOMTR-MCNC: 247 MG/DL (ref 70–99)
HCT VFR BLD AUTO: 37.5 %
HGB BLD-MCNC: 11.6 G/DL
OSMOLALITY SERPL CALC.SUM OF ELEC: 293 MOSM/KG (ref 275–295)
POTASSIUM SERPL-SCNC: 4.5 MMOL/L (ref 3.5–5.1)
SODIUM SERPL-SCNC: 140 MMOL/L (ref 136–145)

## 2025-02-14 PROCEDURE — 99215 OFFICE O/P EST HI 40 MIN: CPT | Performed by: HOSPITALIST

## 2025-02-14 RX ORDER — PSEUDOEPHEDRINE HCL 30 MG
100 TABLET ORAL 2 TIMES DAILY
Qty: 20 CAPSULE | Refills: 0 | Status: SHIPPED | OUTPATIENT
Start: 2025-02-14

## 2025-02-14 RX ORDER — NAPROXEN 250 MG/1
250 TABLET ORAL 2 TIMES DAILY WITH MEALS
Qty: 28 TABLET | Refills: 0 | Status: SHIPPED | OUTPATIENT
Start: 2025-02-14

## 2025-02-14 RX ORDER — ASPIRIN 325 MG
325 TABLET, DELAYED RELEASE (ENTERIC COATED) ORAL 2 TIMES DAILY
Qty: 60 TABLET | Refills: 0 | Status: SHIPPED | OUTPATIENT
Start: 2025-02-14

## 2025-02-14 RX ORDER — ACETAMINOPHEN 325 MG/1
650 TABLET ORAL EVERY 8 HOURS PRN
Qty: 60 TABLET | Refills: 0 | Status: SHIPPED | OUTPATIENT
Start: 2025-02-14

## 2025-02-14 RX ORDER — HYDROCODONE BITARTRATE AND ACETAMINOPHEN 5; 325 MG/1; MG/1
1 TABLET ORAL EVERY 6 HOURS PRN
Qty: 25 TABLET | Refills: 0 | Status: SHIPPED | OUTPATIENT
Start: 2025-02-14

## 2025-02-14 RX ORDER — DOXEPIN HYDROCHLORIDE 50 MG/1
1 CAPSULE ORAL DAILY
Qty: 60 TABLET | Refills: 0 | Status: SHIPPED | OUTPATIENT
Start: 2025-02-15

## 2025-02-14 NOTE — CM/SW NOTE
02/14/25 1500   CM/SW Referral Data   Referral Source Physician   Reason for Referral Discharge planning   Informant Daughter   Medical Hx   Does patient have an established PCP? Yes   Significant Past Medical/Mental Health Hx Rt ASHLEE   Patient Info   Patient's Current Mental Status at Time of Assessment Alert   Patient's Home Environment House   Number of Levels in Home 1   Number of Stair in Home 2   Patient lives with Grandchild   Patient Status Prior to Admission   Independent with ADLs and Mobility No   Pt. requires assistance with Housework;Driving;Meals;Bathing;Ambulating;Finances   Discharge Needs   Anticipated D/C needs Home health care   Choice of Post-Acute Provider   Informed patient of right to choose their preferred provider Yes   List of appropriate post-acute services provided to patient/family with quality data Yes   Patient/family choice [facility]     MDO received for DC planning.  Met with pt and dtr at bedside. Pt is from home and lives with her grandson.  Pts dtr and son are available and assist daily.  Pt uses a walker to ambulate, and Wc for longer distance.   CM explained that therapy is recommending pt go to rehab however, pt is classed as outpatient in a bed and does not have benefits for SHARATH at this time. CM explained that this CM reached out to UR team early this am and that there is no intensity of need to make her inpatient. DTR verbalized understanding.   CM offered private pay at a nursing home vs going home w/ HH.  Per dtr they will make taking pt home with HH work.    HH choice list provided and reviewed.   [facility] is choice and res via [facility].  Shayna [facility] liaison notified of choice via secure chat.    [facility] home [facility]  P:[phone]  F:[phone]    1530: CM informed by [facility] and pts dtr taht pt is actually current with [facility] .  [facility] added to HH ref.  Awaiting acceptance.    Plan: Home w/ [facility] and family support    / to remain available for  support and/or discharge planning.   Chayito Wallace RN, BSN  Nurse   [phone]

## 2025-02-14 NOTE — PHYSICAL THERAPY NOTE
PHYSICAL THERAPY HIP EVALUATION - INPATIENT     Room Number: 430/430-A  Evaluation Date: 2/14/2025  Type of Evaluation: Initial  Physician Order: PT Eval and Treat    Presenting Problem: THR RLE  Co-Morbidities : Syncope collapse, Hypoxia, UTI, DM,  Reason for Therapy: Mobility Dysfunction and Discharge Planning    PHYSICAL THERAPY ASSESSMENT   Patient is a 89 year old female admitted 2/13/2025 for R THR anterior WBAT.  Prior to admission, patient's baseline is Lives at home with Nephew mod indep with all ADL's and mobility Nephew works during the day.  Patient is currently functioning below baseline with bed mobility, transfers, gait, stair negotiation, maintaining seated position, standing prolonged periods, and performing household tasks.  Patient is requiring minimal assist as a result of the following impairments: decreased functional strength, decreased endurance/aerobic capacity, pain, impaired standing balance, decreased muscular endurance, and medical status.  Physical Therapy will continue to follow for duration of hospitalization.    Patient will benefit from continued skilled PT Services to promote return to prior level of function and safety with continuous assistance and gradual rehabilitative therapy .    PLAN DURING HOSPITALIZATION  Nursing Mobility Recommendation : 1 Assist  PT Device Recommendation: Rolling walker  PT Treatment Plan: Bed mobility;Body mechanics;Endurance;Energy conservation;Patient education;Gait training;Strengthening;Transfer training;Balance training  Rehab Potential : Good  Frequency (Obs): Daily     PHYSICAL THERAPY MEDICAL/SOCIAL HISTORY   History related to current admission: Preoperative Diagnosis: Primary osteoarthritis of right hip [M16.11]      Postoperative Diagnosis: Primary osteoarthritis of right hip [M16.11]      Problem List  Principal Problem:    Primary osteoarthritis of right hip  Active Problems:    Diabetes type 2, controlled (HCC)    Hypertension     Dyslipidemia      HOME SITUATION  Type of Home: House  Home Layout: One level  Stairs to Enter : 1   Railing: Yes              Lives With: Family (Nephew who works during ht day as a teacher)        Patient Regularly Uses: Rolling walker     Prior Level of [place]: Pt lives with Nephew at home mod indep with a RW with all mobility and ADL's. Nephew works during the day no help during the day available at home.     SUBJECTIVE  I feel I may need a little rehab prior to returning home I will have to manage indep on my own while my Nephew works at his teachers job during the day. Right now I can not manage that on my own.     PHYSICAL THERAPY EXAMINATION   OBJECTIVE  Precautions: Bed/chair alarm;ASHLEE - anterior;Limb alert - right (HOB remains at 30' per ant hip precautions per ortho)  Fall Risk: Standard fall risk    WEIGHT BEARING RESTRICTION  R Lower Extremity: Weight Bearing as Tolerated      PAIN ASSESSMENT  Ratin  Location: RLE  Management Techniques: Activity promotion;Body mechanics;Breathing techniques;Relaxation;Repositioning    COGNITION  Overall Cognitive Status:  WFL - within functional limits    RANGE OF MOTION AND STRENGTH ASSESSMENT  Upper extremity ROM and strength are within functional limits   Lower extremity ROM is within functional limits   Lower extremity strength is within functional limits RLE 4/5    BALANCE  Static Sitting: Fair +  Dynamic Sitting: Fair  Static Standing: Fair -  Dynamic Standing: Poor +    ACTIVITY TOLERANCE  Pulse: 80  Heart Rate Source: Monitor  Resp: 18  BP: 109/49  BP Location: Right arm  BP Method: Automatic  Patient Position: Sitting    O2 WALK  Oxygen Therapy  SPO2% on Oxygen at Rest: 96  At rest oxygen flow (liters per minute): 2  SPO2% Ambulation on Room Air: 84  SPO2% Ambulation on Oxygen: 90  Ambulation oxygen flow (liters per minute): 2    AM-PAC '6-Clicks' INPATIENT SHORT FORM - BASIC MOBILITY  How much difficulty does the patient currently have...  Patient  Difficulty: Turning over in bed (including adjusting bedclothes, sheets and blankets)?: A Little   Patient Difficulty: Sitting down on and standing up from a chair with arms (e.g., wheelchair, bedside commode, etc.): A Little   Patient Difficulty: Moving from lying on back to sitting on the side of the bed?: A Little   How much help from another person does the patient currently need...   Help from Another: Moving to and from a bed to a chair (including a wheelchair)?: A Little   Help from Another: Need to walk in hospital room?: A Little   Help from Another: Climbing 3-5 steps with a railing?: A Lot     AM-PAC Score:  Raw Score: 17   Approx Degree of Impairment: 50.57%   Standardized Score (AM-PAC Scale): 42.13   CMS Modifier (G-Code): CK    FUNCTIONAL ABILITY STATUS  Functional Mobility/Gait Assessment  Gait Assistance: Minimum assistance  Distance (ft): 40  Assistive Device: Rolling walker  Pattern: R Decreased stance time;Shuffle (decreased step length and juanjose kyphotic posture hypoxia without 02 support 02 sats drop to 80's)  Rolling: minimal assist  Supine to Sit: minimal assist  Sit to Supine: minimal assist  Sit to Stand: minimal assist    Exercise/Education Provided:  Bed mobility  Body mechanics  Energy conservation  Functional activity tolerated  Gait training  Posture  ROM  Strengthening  Lower therapeutic exercise:  Ankle pumps  Heel slides  LAQ  Transfer training    Skilled Therapy Provided: Pt ed with bed mobility and transfers with min A with a RW. Pt ed with gait progression amb 40' with RW with min A with decreased step length. 2 lts 02 support required to maintain 02 sats in the 90's with amb. Pt ed with therex x 10 reps in chair for LE strength and ROM as per HEP. PT will benefit from GR with PT, OT prior to a return home to regain maximal PLOF and safety.     The patient's Approx Degree of Impairment: 50.57% has been calculated based on documentation in the [facility] '6 clicks' Inpatient Basic  Mobility Short Form.  Research supports that patients with this level of impairment may benefit from GR with PT, OT.    Final disposition will be made by interdisciplinary medical team.    Patient End of Session: Up in chair;With  staff;Needs met;Call light within reach;RN aware of session/findings;All patient questions and concerns addressed;Alarm set    CURRENT GOALS  Goals to be met by: 2/30/2025  Patient Goal Patient's self-stated goal is: Return home    Goal #1 Patient is able to demonstrate supine - sit EOB @ level: modified independent   Goal #1   Current Status    Goal #2 Patient is able to demonstrate transfers Sit to/from Stand at assistance level: modified independent     Goal #2  Current Status    Goal #3 Patient is able to ambulate 300 feet with assistive device at assistance level: modified independent    Goal #3   Current Status    Goal #4 Patient will negotiate 2 stairs/one curb w/ assistive device and supervision   Goal #4   Current Status    Goal #5 Patient verbalizes and/or demonstrates all precautions and safety concerns independently   Goal #5   Current Status    Goal #6 Patient independently performs home exercise program for ROM/strengthening per the instructions provided in preparation for discharge.   Goal #6  Current Status      Patient Evaluation Complexity Level:  History Low - no personal factors and/or co-morbidities   Examination of body systems Low -  addressing 1-2 elements   Clinical Presentation Low- Stable   Clinical Decision Making  Low Complexity     Gait Training: 15 minutes

## 2025-02-14 NOTE — CM/SW NOTE
Anticipated therapy need: Other TBD    CM requested department  ([facility]) to initiate [facility] referral for HHC.   F2F entered.  Pt is currently Outpt in a bed status and does not have benefits for SHARATH.  CM requested UR team to review case for inpt status.    SW/CM will continue to follow.     Chayito Wallace RN, BSN  Nurse   [phone]

## 2025-02-14 NOTE — CM/SW NOTE
This is a Code 44. Patient failed inpatient criteria. Second level of review completed and supports OPIB.  UR committee in agreement. UR EVITA MORROW discussed with Dr. Abdi  who approves OPIB status.  Observation order placed by UR EVITA GAO given to the patient and signed copy placed in their chart.

## 2025-02-14 NOTE — OCCUPATIONAL THERAPY NOTE
OCCUPATIONAL THERAPY EVALUATION - INPATIENT     Room Number: 430/430-A  Evaluation Date: 2/14/2025  Type of Evaluation: Initial  Presenting Problem: s/p R anterior ASHLEE by Dr. Abdi 2/13    Physician Order: IP Consult to Occupational Therapy  Reason for Therapy: ADL/IADL Dysfunction and Discharge Planning    OCCUPATIONAL THERAPY ASSESSMENT   Patient is a 89 year old female admitted 2/13/2025 for s/p R anterior ASHLEE by Dr. Abdi.  Prior to admission, patient's baseline is indep with ADLs and MI for fx mobility using RW.  Patient is currently functioning near baseline with ADLs and fx mobility/transfers. Patient is requiring up to mod assist for ADLs as a result of the following impairments: decreased functional strength, decreased functional reach, decreased endurance, impaired balance, decreased muscular endurance, and increased O2 needs from baseline. Occupational Therapy will continue to follow for duration of hospitalization.    Patient will benefit from continued skilled OT Services at discharge to promote prior level of function and safety with additional support and return home with home health OT.    PLAN DURING HOSPITALIZATION     OT Treatment Plan: Balance activities;Energy conservation/work simplification techniques;ADL training;Functional transfer training;Endurance training;Patient/Family education;Patient/Family training;Equipment eval/education;Compensatory technique education     OCCUPATIONAL THERAPY MEDICAL/SOCIAL HISTORY   Problem List   Principal Problem:    Primary osteoarthritis of right hip  Active Problems:    Diabetes type 2, controlled (HCC)    Hypertension    Dyslipidemia    HOME SITUATION  Type of Home: House  Home Layout: One level  Lives With: Family (works during the day)  Toilet and Equipment: Standard height toilet  Shower/Tub and Equipment: Walk-in shower  Hand Dominance: Right  Patient Regularly Uses: Rolling walker    SUBJECTIVE  Patient agreeable to OT  evaluation.    OCCUPATIONAL THERAPY EXAMINATION   OBJECTIVE  Precautions: Bed/chair alarm; ASHLEE - anterior (HOB >30 degrees)  Fall Risk: Standard fall risk    WEIGHT BEARING RESTRICTION  R Lower Extremity: Weight Bearing as Tolerated    PAIN ASSESSMENT  Ratin    O2 SATURATIONS  Oxygen Therapy  SPO2% on Oxygen at Rest: 96  At rest oxygen flow (liters per minute): 2  SPO2% Ambulation on Room Air: 84 (recovered to 96% after 2 min seated recovery period on 2L)    COGNITION  Overall Cognitive Status:  WFL - within functional limits    RANGE OF MOTION   Upper extremity ROM is within functional limits     STRENGTH ASSESSMENT  Upper extremity strength is within functional limits     ACTIVITIES OF DAILY LIVING ASSESSMENT  AM-PAC ‘6-Clicks’ Inpatient Daily Activity Short Form  How much help from another person does the patient currently need…  -   Putting on and taking off regular lower body clothing?: A Little  -   Bathing (including washing, rinsing, drying)?: A Lot  -   Toileting, which includes using toilet, bedpan or urinal? : A Little  -   Putting on and taking off regular upper body clothing?: None  -   Taking care of personal grooming such as brushing teeth?: A Little  -   Eating meals?: None    AM-PAC Score:  Score: 19  Approx Degree of Impairment: 42.8%  Standardized Score (AM-PAC Scale): 40.22  CMS Modifier (G-Code): CK    BED MOBILITY  Supine to Sit: stand-by assist    FUNCTIONAL TRANSFER ASSESSMENT  Sit to Stand from EOB: contact guard assist  Toilet Transfer: contact guard assist  Chair Transfer: contact guard assist    FUNCTIONAL MOBILITY  contact guard assist progressing to SBA for in-room fx mobility using RW    ACTIVITIES OF DAILY LIVING  Eating: independent (per obs)   Grooming: stand-by assist standing at sink   UB Dressing: independent (per obs)   LB Dressing: min assist  Toileting: supervision seated     EDUCATION PROVIDED  Patient Education : Role of Occupational Therapy; Discharge Recommendations;  Plan of Care; Functional Transfer Techniques; Weight Bear Status; Fall Prevention; Surgical Precautions; Posture/Positioning; Energy Conservation; Proper Body Mechanics  Patient's Response to Education: Returned Demonstration; Verbalized Understanding    The patient's Approx Degree of Impairment: 42.8% has been calculated based on documentation in the [facility] '6 clicks' Inpatient Daily Activity Short Form.  Research supports that patients with this level of impairment may benefit from HH OT.  Final disposition will be made by interdisciplinary medical team.    Patient End of Session: Up in chair;Needs met;All patient questions and concerns addressed;RN aware of session/findings;Call light within reach;Hospital anti-slip socks;Family present;Alarm set    OT Goals  Patient self-stated goal is: none stated     Patient will complete LE dressing with MI  Comment:     Patient will complete toilet transfer with MI  Comment:     Patient will complete self care task at sink level with MI   Comment:    Patient will independently recall anterior hip precautions  Comment:         Goals  on: 25  Frequency: 3-5x/week    Patient Evaluation Complexity Level:   Occupational Profile/Medical History MODERATE - Expanded review of history including review of medical or therapy record   Specific performance deficits impacting engagement in ADL/IADL MODERATE  3 - 5 performance deficits   Client Assessment/Performance Deficits MODERATE - Comorbidities and min to mod modifications of tasks    Clinical Decision Making MODERATE - Analysis of occupational profile, detailed assessments, several treatment options    Overall Complexity MODERATE     OT Session Time  Self-Care Home Management: 12 minutes  Therapeutic Activity: 11 minutes    GERALDO Hogan/L  [facility]  #[id number]

## 2025-02-14 NOTE — PLAN OF CARE
Monitoring vital signs, stable at this time. No acute changes at this moment. Denies pain at this time. Wound vac is intact. Arcos is intact. Fall precautions in place- bed alarm on and bed locked in lowest position. Belongings and call light within reach. Frequent rounding by nursing staff.     Problem: Patient Centered Care  Goal: Patient preferences are identified and integrated in the patient's plan of care  Description: Interventions:  - What would you like us to know as we care for you?   - Provide timely, complete, and accurate information to patient/family  - Incorporate patient and family knowledge, values, beliefs, and cultural backgrounds into the planning and delivery of care  - Encourage patient/family to participate in care and decision-making at the level they choose  - Honor patient and family perspectives and choices  Outcome: Progressing     Problem: PAIN - ADULT  Goal: Verbalizes/displays adequate comfort level or patient's stated pain goal  Description: INTERVENTIONS:  - Encourage pt to monitor pain and request assistance  - Assess pain using appropriate pain scale  - Administer analgesics based on type and severity of pain and evaluate response  - Implement non-pharmacological measures as appropriate and evaluate response  - Consider cultural and social influences on pain and pain management  - Manage/alleviate anxiety  - Utilize distraction and/or relaxation techniques  - Monitor for opioid side effects  - Notify MD/LIP if interventions unsuccessful or patient reports new pain  - Anticipate increased pain with activity and pre-medicate as appropriate  Outcome: Progressing     Problem: RISK FOR INFECTION - ADULT  Goal: Absence of fever/infection during anticipated neutropenic period  Description: INTERVENTIONS  - Monitor WBC  - Administer growth factors as ordered  - Implement neutropenic guidelines  Outcome: Progressing     Problem: SAFETY ADULT - FALL  Goal: Free from fall injury  Description:  INTERVENTIONS:  - Assess pt frequently for physical needs  - Identify cognitive and physical deficits and behaviors that affect risk of falls.  - [place] fall precautions as indicated by assessment.  - Educate pt/family on patient safety including physical limitations  - Instruct pt to call for assistance with activity based on assessment  - Modify environment to reduce risk of injury  - Provide assistive devices as appropriate  - Consider OT/PT consult to assist with strengthening/mobility  - Encourage toileting schedule  Outcome: Progressing     Problem: DISCHARGE PLANNING  Goal: Discharge to home or other facility with appropriate resources  Description: INTERVENTIONS:  - Identify barriers to discharge w/pt and caregiver  - Include patient/family/discharge partner in discharge planning  - Arrange for needed discharge resources and transportation as appropriate  - Identify discharge learning needs (meds, wound care, etc)  - Arrange for interpreters to assist at discharge as needed  - Consider post-discharge preferences of patient/family/discharge partner  - Complete POLST form as appropriate  - Assess patient's ability to be responsible for managing their own health  - Refer to Case Management Department for coordinating discharge planning if the patient needs post-hospital services based on physician/LIP order or complex needs related to functional status, cognitive ability or social support system  Outcome: Progressing     Problem: Patient/Family Goals  Goal: Patient/Family Long Term Goal  Description: Patient's Long Term Goal: To discharge    Interventions:  - Monitor vital signs   - Monitor appropriate labs   - Pain management   - Administer medications per order   - Follow MD orders   - Diagnostics per order   - Update/inform patient and family on plan of care   - Discharge planning   - See additional Care Plan goals for specific interventions  Outcome: Progressing  Goal: Patient/Family Short Term  Goal  Description: Patient's Short Term Goal: To improve mobility     Interventions:   - Pain management   - PT/OT  - Follow MD orders     - See additional Care Plan goals for specific interventions  Outcome: Progressing     Problem: MUSCULOSKELETAL - ADULT  Goal: Return mobility to safest level of function  Description: INTERVENTIONS:  - Assess patient stability and activity tolerance for standing, transferring and ambulating w/ or w/o assistive devices  - Assist with transfers and ambulation using safe patient handling equipment as needed  - Ensure adequate protection for wounds/incisions during mobilization  - Obtain PT/OT consults as needed  - Advance activity as appropriate  - Communicate ordered activity level and limitations with patient/family  Outcome: Progressing  Goal: Maintain proper alignment of affected body part  Description: INTERVENTIONS:  - Support and protect limb and body alignment per provider's orders  - Instruct and reinforce with patient and family use of appropriate assistive device and precautions (e.g. spinal or hip dislocation precautions)  Outcome: Progressing

## 2025-02-14 NOTE — PROGRESS NOTES
[facility]  part of [facility]    Progress Note    Rossy Cano Patient Status:  Outpatient in a Bed    1933 MRN [id number]   Location [facility] 4W/SW/SE Attending Aly Cates,*   Hosp Day # 0 PCP Anthony Salinas MD         Subjective:   Subjective:  Patient awake, seated in bed.  Daughter at bedside.  Patient reports he is doing well.  Was able to ambulate with assist from rehab.  Denies any numbness or tingling.  Pain well-controlled.    Patient is hoping to go to an acute rehab, as her grandson who she lives with goes to work throughout the day and she is left alone.    Objective:   Blood pressure 109/49, pulse 79, temperature 97.5 °F (36.4 °C), temperature source Temporal, resp. rate 18, height 5' 6\" (1.676 m), weight 170 lb (77.1 kg), SpO2 97%.  Physical Exam  Wound VAC on, clean and dry.  No asymmetric warmth or swelling to right thigh.  Lower extremity skin healthy, no pitting edema.  Calf soft nontender.  Able to straight leg raise bilaterally.  Able to dorsiflex and plantarflex against resistance.  Denies numbness and tingling    Results:   Lab Results   Component Value Date    WBC 5.4 10/02/2024    HGB 11.6 (L) 2025    HCT 37.5 2025    .0 10/02/2024    CREATSERUM 0.72 2025    BUN 17 2025     2025    K 4.5 2025     2025    CO2 28.0 2025     (H) 2025    CA 8.4 (L) 2025    ALB 4.0 10/01/2024    ALKPHO 58 10/01/2024    BILT 0.9 10/01/2024    TP 6.4 10/01/2024    AST 27 10/01/2024    ALT 15 10/01/2024    TSH 1.166 10/01/2024    LIP 35 10/01/2024    MG 1.8 04/10/2022    TROP <0.045 2020    TROPHS 4 10/01/2024       XR FLUOROSCOPY C-ARM TIME LESS THAN 1 HOUR (CPT=76000)    Result Date: 2025  CONCLUSION:  1. Fluoroscopic guidance utilized during right hip arthroplasty procedure. 2. See procedure note    Dictated by (CST): Zoran Nettles MD on 2025 at  10:36 AM     Finalized by (CST): Zoran Nettles MD on 2/14/2025 at 10:38 AM          XR HIP W OR WO PELVIS 2 OR 3 VIEWS, RIGHT (CPT=73502)    Result Date: 2/13/2025  CONCLUSION: Postsurgical changes compatible with recent right hip arthroplasty.    Dictated by (CST): Yonatan Taylor MD on 2/13/2025 at 11:46 AM     Finalized by (CST): Yonatan Taylor MD on 2/13/2025 at 11:46 AM               Assessment & Plan:     Primary osteoarthritis of right hip  Patient doing well postop day #1.  Discussed wound care medications with the patient.  From an orthopedic standpoint she will continue working with physical therapy.  Patient planning discharge to rehab facility.      Diabetes type 2, controlled (HCC)  Per medicine      Hypertension  Per medicine      Dyslipidemia  Per medicine              PURNIMA Sandy  2/14/2025

## 2025-02-14 NOTE — PLAN OF CARE
Problem: Patient Centered Care  Goal: Patient preferences are identified and integrated in the patient's plan of care  Description: Interventions:  - What would you like us to know as we care for you? From home with his grandson who works at daytime and will not be able to be there physically with her everyday but is her support system.  - Provide timely, complete, and accurate information to patient/family  - Incorporate patient and family knowledge, values, beliefs, and cultural backgrounds into the planning and delivery of care  - Encourage patient/family to participate in care and decision-making at the level they choose  - Honor patient and family perspectives and choices  Outcome: Progressing     Problem: PAIN - ADULT  Goal: Verbalizes/displays adequate comfort level or patient's stated pain goal  Description: INTERVENTIONS:  - Encourage pt to monitor pain and request assistance  - Assess pain using appropriate pain scale  - Administer analgesics based on type and severity of pain and evaluate response  - Implement non-pharmacological measures as appropriate and evaluate response  - Consider cultural and social influences on pain and pain management  - Manage/alleviate anxiety  - Utilize distraction and/or relaxation techniques  - Monitor for opioid side effects  - Notify MD/LIP if interventions unsuccessful or patient reports new pain  - Anticipate increased pain with activity and pre-medicate as appropriate  Outcome: Progressing     Problem: RISK FOR INFECTION - ADULT  Goal: Absence of fever/infection during anticipated neutropenic period  Description: INTERVENTIONS  - Monitor WBC  - Administer growth factors as ordered  - Implement neutropenic guidelines  Outcome: Progressing     Problem: SAFETY ADULT - FALL  Goal: Free from fall injury  Description: INTERVENTIONS:  - Assess pt frequently for physical needs  - Identify cognitive and physical deficits and behaviors that affect risk of falls.  - [place]  fall precautions as indicated by assessment.  - Educate pt/family on patient safety including physical limitations  - Instruct pt to call for assistance with activity based on assessment  - Modify environment to reduce risk of injury  - Provide assistive devices as appropriate  - Consider OT/PT consult to assist with strengthening/mobility  - Encourage toileting schedule  Outcome: Progressing     Problem: DISCHARGE PLANNING  Goal: Discharge to home or other facility with appropriate resources  Description: INTERVENTIONS:  - Identify barriers to discharge w/pt and caregiver  - Include patient/family/discharge partner in discharge planning  - Arrange for needed discharge resources and transportation as appropriate  - Identify discharge learning needs (meds, wound care, etc)  - Arrange for interpreters to assist at discharge as needed  - Consider post-discharge preferences of patient/family/discharge partner  - Complete POLST form as appropriate  - Assess patient's ability to be responsible for managing their own health  - Refer to Case Management Department for coordinating discharge planning if the patient needs post-hospital services based on physician/LIP order or complex needs related to functional status, cognitive ability or social support system  Outcome: Progressing     Problem: Patient/Family Goals  Goal: Patient/Family Long Term Goal  Description: Patient's Long Term Goal: Will be able to walk well and independently with a walker and will have no complications from surgery    Interventions:  - Monitor vital signs and labs and refer as needed.  - Up as tolerated with walker, WBAT to right leg.  - Pain management with oral medication.  - PT/OT as ordered.  - Monitor incision for any signs of infection or bleeding.  - Administer oral anticoagulant to prevent blood clots.  - Encourage use of incentive spirometry to prevent pneumonia.  - Follow up with surgery as recommended.  - See additional Care Plan goals for  specific interventions  Outcome: Progressing  Goal: Patient/Family Short Term Goal  Description: Patient's Short Term Goal: To improve mobility and when passes therapy.    Interventions:   - Pain management with oral and IVP meds.  - PT/OT  - Follow MD orders   - Monitor vital signs and labs and refer as needed.  - Up as tolerated with walker, WBAT to right leg.  -  as Monitor incision for any signs of infection or bleeding.  - Administer oral anticoagulant to prevent blood clots. TEDS and SCDS to both legs.  - Encourage use of incentive spirometry to prevent pneumonia.  - See additional Care Plan goals for specific interventions  Outcome: Progressing     Problem: MUSCULOSKELETAL - ADULT  Goal: Return mobility to safest level of function  Description: INTERVENTIONS:  - Assess patient stability and activity tolerance for standing, transferring and ambulating w/ or w/o assistive devices  - Assist with transfers and ambulation using safe patient handling equipment as needed  - Ensure adequate protection for wounds/incisions during mobilization  - Obtain PT/OT consults as needed  - Advance activity as appropriate  - Communicate ordered activity level and limitations with patient/family  Outcome: Progressing  Goal: Maintain proper alignment of affected body part  Description: INTERVENTIONS:  - Support and protect limb and body alignment per provider's orders  - Instruct and reinforce with patient and family use of appropriate assistive device and precautions (e.g. spinal or hip dislocation precautions)  Outcome: Progressing    Patient is alert and oriented, aware to call for help as needed.  Patient is currently on 2LPM of O2 via NC, lung sounds diminished and patient is a little SOB when walking but denies shortness of breathing.  Patient was up with PT in short distance this morning using a walker.  Patient still needs to void post lawrence removal this morning.  Patient plans to go rehab when stable.

## 2025-02-14 NOTE — CM/SW NOTE
Department  notified of request for HHC, aidin referrals started. Assigned CM/SW to follow up with pt/family on further discharge planning.     Sophie Almanza, DSC

## 2025-02-14 NOTE — PROGRESS NOTES
[facility]  part of [facility]    Progress Note    Rossy Cano Patient Status:  Outpatient in a Bed    1933 MRN [id number]   Location [facility] 4W/SW/SE Attending Aly Cates,*   Hosp Day # 1 PCP Anthony Salinas MD     Chief Complaint: ok    Subjective:     Constitutional:  Positive for fatigue. Negative for appetite change and chills.   HENT:  Positive for hearing loss. Negative for congestion.    Respiratory:  Negative for choking and chest tightness.    Cardiovascular:  Negative for leg swelling.   Gastrointestinal:  Negative for abdominal pain and constipation.   Musculoskeletal:  Positive for joint swelling and joint pain.   Neurological:  Negative for weakness.   Psychiatric/Behavioral:  Positive for sleep disturbance. Negative for confusion and decreased concentration. The patient is not nervous/anxious.        Objective:   Blood pressure 110/48, pulse 81, temperature 97.9 °F (36.6 °C), temperature source Temporal, resp. rate 17, height 5' 6\" (1.676 m), weight 170 lb (77.1 kg), SpO2 94%.  Physical Exam  Vitals and nursing note reviewed.   Constitutional:       General: She is not in acute distress.     Appearance: She is not ill-appearing, toxic-appearing or diaphoretic.   HENT:      Head: Normocephalic and atraumatic.   Cardiovascular:      Rate and Rhythm: Normal rate.      Pulses: Normal pulses.   Pulmonary:      Effort: Pulmonary effort is normal.      Breath sounds: Rhonchi present.   Abdominal:      General: Bowel sounds are normal.      Palpations: Abdomen is soft.   Musculoskeletal:         General: Swelling present.   Skin:     General: Skin is warm and dry.      Capillary Refill: Capillary refill takes less than 2 seconds.   Neurological:      Mental Status: She is alert.   Psychiatric:         Behavior: Behavior normal.         Judgment: Judgment normal.         Results:   Lab Results   Component Value Date    WBC 5.4 10/02/2024    HGB 11.6  (L) 02/14/2025    HCT 37.5 02/14/2025    .0 10/02/2024    CREATSERUM 0.72 02/14/2025    BUN 17 02/14/2025     02/14/2025    K 4.5 02/14/2025     02/14/2025    CO2 28.0 02/14/2025     (H) 02/14/2025    CA 8.4 (L) 02/14/2025    ALB 4.0 10/01/2024    ALKPHO 58 10/01/2024    BILT 0.9 10/01/2024    TP 6.4 10/01/2024    AST 27 10/01/2024    ALT 15 10/01/2024    TSH 1.166 10/01/2024    LIP 35 10/01/2024    MG 1.8 04/10/2022    TROP <0.045 04/23/2020    TROPHS 4 10/01/2024       XR FLUOROSCOPY C-ARM TIME LESS THAN 1 HOUR (CPT=76000)    Result Date: 2/14/2025  CONCLUSION:  1. Fluoroscopic guidance utilized during right hip arthroplasty procedure. 2. See procedure note    Dictated by (CST): Zoran Nettles MD on 2/14/2025 at 10:36 AM     Finalized by (CST): Zoran Nettles MD on 2/14/2025 at 10:38 AM          XR HIP W OR WO PELVIS 2 OR 3 VIEWS, RIGHT (CPT=73502)    Result Date: 2/13/2025  CONCLUSION: Postsurgical changes compatible with recent right hip arthroplasty.    Dictated by (CST): Yonatan Taylor MD on 2/13/2025 at 11:46 AM     Finalized by (CST): Yonatan Taylor MD on 2/13/2025 at 11:46 AM               Assessment & Plan:     1.       Right hip primary osteoarthritis, status post right total hip arthroplasty, anterior approach.  Pain control. 2/13 pod#1 Monitor surgical wound and drain.  Spinal block.  Neurovascular checks.  Aspirin for DVT prophylaxis.  Physical and occupational therapy.    2.       Diabetes mellitus type 2.  Continue home medications.  Monitor Accu-Cheks.  Sliding scale insulin.  3.       Essential hypertension.  Continue home medications and monitor.  4.       Hyperlipidemia.  Continue home medications.    Dispo: home with c vs thrive     Patient will require inpatient services that will reasonably be expected to span two midnight's based on the clinical documentation in H+P.   Based on patients current state of illness, I anticipate that, after discharge,  patient will require TBD.  Complex mdm; coordinating care with nurse/cm and counseling pt andwith her permission her daughter in room about rehab    JOHNNIE BYERS MD

## 2025-02-14 NOTE — DISCHARGE INSTRUCTIONS
Pump feet often to help prevent blood clots. Weight bearing as tolerated with walker.  When sitting in a chair elevate the leg.  Ice for up to 20 minutes at a time. Do not lean forward while sitting (for example do not bend down to pick anything up or to tie your shoes). Do not bend hip more than 90 degrees. Do not lift your knee higher than your hip. Do not sit on low chairs, beds or toilets. Do not cross your legs.  When you get home, you should wear knee-high NATHANIEL hose during the daytime to prevent swelling of the legs.    Leave the wound VAC for 7 days postop.  On the seventh day, remove the wound VAC dressing.  You can dispose the device.  Paint the wound with Betadine using Q-tips and cover with silver Mepilex.  The next day you can simply open the lab works and paint.  Never double dip the Q-tips in the Betadine bottle.  Sponge bath only. Take medications as prescribed in the discharge paperwork.         Sometimes managing your health at home requires assistance.  The [facility] team has recognized your preference to use [facility].  They can be reached by phone at [phone].  The fax number for your reference is [phone].  A representative from the home health agency will contact you or your family to schedule your first visit.      Ok to go home with hhc/pt/ot and daughter staying  [facility] for all wound care and activity orders  Send home with inc nikolas     Medication List        START taking these medications      acetaminophen 325 MG Tabs  Commonly known as: Tylenol  Take 2 tablets (650 mg total) by mouth every 8 (eight) hours as needed. Maximum 4000 mg Tylenol/acetaminophen daily from all sources.  Each Norco has 325 mg of Tylenol/acetaminophen in it.     aspirin 325 MG Tbec  Take 1 tablet (325 mg total) by mouth in the morning and 1 tablet (325 mg total) before bedtime. Do not crush.     calcium carbonate-vitamin D 250-3.125 MG-MCG Tabs  Commonly known as: Oyster  Shell-D  Take 1 tablet by mouth 2 (two) times daily with meals.     docusate sodium 100 MG Caps  Commonly known as: COLACE  Take 100 mg by mouth 2 (two) times daily. Do not crush. Stop if loose stool     HYDROcodone-acetaminophen 5-325 MG Tabs  Commonly known as: Norco  Take 1 tablet by mouth every 6 (six) hours as needed. No alcohol or driving on this med. Stop if lethargic or hallucinating.  Maximum 4000 mg Tylenol/acetaminophen daily from all sources.  Each Norco has 325 mg of Tylenol/acetaminophen in it.     multivitamin Tabs  Take 1 tablet by mouth daily.     naproxen 250 MG Tabs  Commonly known as: Naprosyn  Take 1 tablet (250 mg total) by mouth 2 (two) times daily with meals. Take with food.  Stop if stomach upset.            CONTINUE taking these medications      amLODIPine 5 MG Tabs  Commonly known as: Norvasc     atorvastatin 40 MG Tabs  Commonly known as: Lipitor     CRANBERRY OR     FreeStyle Lancets Misc     gabapentin 100 MG Caps  Commonly known as: Neurontin     Glucose Blood Strp     hydroCHLOROthiazide 12.5 MG Tabs     Insulin Syringes (Disposable) U-100 0.5 ML Misc     Lantus SoloStar 100 UNIT/ML Sopn  Generic drug: insulin glargine     lisinopril 40 MG Tabs  Commonly known as: Prinivil; Zestril     LUTEIN OR     Lyumjev KwikPen 100 UNIT/ML Sopn  Generic drug: Insulin Lispro-aabc (1 U Dial)     metFORMIN  MG Tb24  Commonly known as: Glucophage XR     metoprolol succinate  MG Tb24  Commonly known as: Toprol XL     Multi For Her 50+ Caps               Where to Get Your Medications        These medications were sent to [id number] - [place], [place] - [address] [phone], [phone] [address], [facility] [address]      Phone: [phone]   acetaminophen 325 MG Tabs  aspirin 325 MG Tbec  calcium carbonate-vitamin D 250-3.125 MG-MCG Tabs  docusate sodium 100 MG Caps  multivitamin Tabs  naproxen 250 MG Tabs       You can get these medications from any pharmacy     Bring a paper prescription for each of these medications  HYDROcodone-acetaminophen 5-325 MG Tabs

## 2025-02-15 LAB
ANION GAP SERPL CALC-SCNC: 7 MMOL/L (ref 0–18)
BASOPHILS # BLD AUTO: 0.02 X10(3) UL (ref 0–0.2)
BASOPHILS NFR BLD AUTO: 0.3 %
BUN BLD-MCNC: 11 MG/DL (ref 9–23)
BUN/CREAT SERPL: 16.7 (ref 10–20)
CALCIUM BLD-MCNC: 8.6 MG/DL (ref 8.7–10.4)
CHLORIDE SERPL-SCNC: 109 MMOL/L (ref 98–112)
CO2 SERPL-SCNC: 26 MMOL/L (ref 21–32)
CREAT BLD-MCNC: 0.66 MG/DL
DEPRECATED RDW RBC AUTO: 41.6 FL (ref 35.1–46.3)
EGFRCR SERPLBLD CKD-EPI 2021: 83 ML/MIN/1.73M2 (ref 60–?)
EOSINOPHIL # BLD AUTO: 0.21 X10(3) UL (ref 0–0.7)
EOSINOPHIL NFR BLD AUTO: 2.7 %
ERYTHROCYTE [DISTWIDTH] IN BLOOD BY AUTOMATED COUNT: 12.7 % (ref 11–15)
GLUCOSE BLD-MCNC: 152 MG/DL (ref 70–99)
GLUCOSE BLDC GLUCOMTR-MCNC: 158 MG/DL (ref 70–99)
GLUCOSE BLDC GLUCOMTR-MCNC: 220 MG/DL (ref 70–99)
GLUCOSE BLDC GLUCOMTR-MCNC: 261 MG/DL (ref 70–99)
GLUCOSE BLDC GLUCOMTR-MCNC: 275 MG/DL (ref 70–99)
HCT VFR BLD AUTO: 31.4 %
HGB BLD-MCNC: 10.2 G/DL
IMM GRANULOCYTES # BLD AUTO: 0.03 X10(3) UL (ref 0–1)
IMM GRANULOCYTES NFR BLD: 0.4 %
LYMPHOCYTES # BLD AUTO: 0.77 X10(3) UL (ref 1–4)
LYMPHOCYTES NFR BLD AUTO: 9.9 %
MAGNESIUM SERPL-MCNC: 1.2 MG/DL (ref 1.6–2.6)
MCH RBC QN AUTO: 29.3 PG (ref 26–34)
MCHC RBC AUTO-ENTMCNC: 32.5 G/DL (ref 31–37)
MCV RBC AUTO: 90.2 FL
MONOCYTES # BLD AUTO: 0.76 X10(3) UL (ref 0.1–1)
MONOCYTES NFR BLD AUTO: 9.7 %
NEUTROPHILS # BLD AUTO: 6.01 X10 (3) UL (ref 1.5–7.7)
NEUTROPHILS # BLD AUTO: 6.01 X10(3) UL (ref 1.5–7.7)
NEUTROPHILS NFR BLD AUTO: 77 %
OSMOLALITY SERPL CALC.SUM OF ELEC: 296 MOSM/KG (ref 275–295)
PHOSPHATE SERPL-MCNC: 2.5 MG/DL (ref 2.4–5.1)
PLATELET # BLD AUTO: 132 10(3)UL (ref 150–450)
POTASSIUM SERPL-SCNC: 4 MMOL/L (ref 3.5–5.1)
RBC # BLD AUTO: 3.48 X10(6)UL
SODIUM SERPL-SCNC: 142 MMOL/L (ref 136–145)
WBC # BLD AUTO: 7.8 X10(3) UL (ref 4–11)

## 2025-02-15 PROCEDURE — 99214 OFFICE O/P EST MOD 30 MIN: CPT | Performed by: HOSPITALIST

## 2025-02-15 RX ORDER — MAGNESIUM OXIDE 400 MG/1
800 TABLET ORAL ONCE
Status: COMPLETED | OUTPATIENT
Start: 2025-02-15 | End: 2025-02-15

## 2025-02-15 NOTE — PROGRESS NOTES
[facility]  part of [facility]    Progress Note    Rossy Cano Patient Status:  Outpatient in a Bed    1933 MRN [id number]   Location [facility] 4W/SW/SE Attending Aly Cates,*   Hosp Day # 1 PCP Anthony Salinas MD       Subjective:   Rossy Cano is a(n) 89 year old female, right total hip arthroplasty through an anterior approach.  Her pain is controlled.  Her son is at the bedside.  She worked with therapy this morning.    Objective:   Blood pressure 133/60, pulse 96, temperature 98 °F (36.7 °C), temperature source Oral, resp. rate 16, height 5' 6\" (1.676 m), weight 170 lb (77.1 kg), SpO2 95%.    General appearance: alert, appears stated age and cooperative  INCISION/WOUND: clean, dry and intact, wound VAC dressing intact and in place and no evidence of infection  Extremities: No calf pain  Pulses: 2+ and symmetric  Neurologic: Grossly normal    Assessment and Plan:     Primary osteoarthritis of right hip  Postop day #2 status post right total hip arthroplasty through an anterior approach.  Continue PT/OT.  Discharge planning for home with home care versus SNF.  Continue rehab.      Diabetes type 2, controlled (HCC)        Hypertension        Dyslipidemia          Results:     Lab Results   Component Value Date    WBC 7.8 02/15/2025    HGB 10.2 (L) 02/15/2025    HCT 31.4 (L) 02/15/2025    .0 (L) 02/15/2025    CREATSERUM 0.66 02/15/2025    BUN 11 02/15/2025     02/15/2025    K 4.0 02/15/2025     02/15/2025    CO2 26.0 02/15/2025     (H) 02/15/2025    CA 8.6 (L) 02/15/2025    ALB 4.0 10/01/2024    ALKPHO 58 10/01/2024    BILT 0.9 10/01/2024    TP 6.4 10/01/2024    AST 27 10/01/2024    ALT 15 10/01/2024    TSH 1.166 10/01/2024    LIP 35 10/01/2024    MG 1.2 (L) 02/15/2025    PHOS 2.5 02/15/2025    TROP <0.045 2020       No results found.          LANCE FLOREZ PA-C  2/15/2025 recovering from a right total hip  arthroplasty through an anterior approach.  She is up in her chair.  Her son is at the bedside.  Her pain is controlled.

## 2025-02-15 NOTE — PLAN OF CARE
Patient is alert and oriented. Denies pain. On 2L oxygen. Prevena wound vac intact. Voiding via purewick and up to bathroom.  ml. Tolerating diet. Safety precautions in place.     Problem: Patient Centered Care  Goal: Patient preferences are identified and integrated in the patient's plan of care  Description: Interventions:  - What would you like us to know as we care for you? From home with his grandson who works at daytime and will not be able to be there physically with her everyday but is her support system.  - Provide timely, complete, and accurate information to patient/family  - Incorporate patient and family knowledge, values, beliefs, and cultural backgrounds into the planning and delivery of care  - Encourage patient/family to participate in care and decision-making at the level they choose  - Honor patient and family perspectives and choices  Outcome: Progressing     Problem: PAIN - ADULT  Goal: Verbalizes/displays adequate comfort level or patient's stated pain goal  Description: INTERVENTIONS:  - Encourage pt to monitor pain and request assistance  - Assess pain using appropriate pain scale  - Administer analgesics based on type and severity of pain and evaluate response  - Implement non-pharmacological measures as appropriate and evaluate response  - Consider cultural and social influences on pain and pain management  - Manage/alleviate anxiety  - Utilize distraction and/or relaxation techniques  - Monitor for opioid side effects  - Notify MD/LIP if interventions unsuccessful or patient reports new pain  - Anticipate increased pain with activity and pre-medicate as appropriate  Outcome: Progressing     Problem: RISK FOR INFECTION - ADULT  Goal: Absence of fever/infection during anticipated neutropenic period  Description: INTERVENTIONS  - Monitor WBC  - Administer growth factors as ordered  - Implement neutropenic guidelines  Outcome: Progressing     Problem: SAFETY ADULT - FALL  Goal: Free  from fall injury  Description: INTERVENTIONS:  - Assess pt frequently for physical needs  - Identify cognitive and physical deficits and behaviors that affect risk of falls.  - Melvin fall precautions as indicated by assessment.  - Educate pt/family on patient safety including physical limitations  - Instruct pt to call for assistance with activity based on assessment  - Modify environment to reduce risk of injury  - Provide assistive devices as appropriate  - Consider OT/PT consult to assist with strengthening/mobility  - Encourage toileting schedule  Outcome: Progressing     Problem: DISCHARGE PLANNING  Goal: Discharge to home or other facility with appropriate resources  Description: INTERVENTIONS:  - Identify barriers to discharge w/pt and caregiver  - Include patient/family/discharge partner in discharge planning  - Arrange for needed discharge resources and transportation as appropriate  - Identify discharge learning needs (meds, wound care, etc)  - Arrange for interpreters to assist at discharge as needed  - Consider post-discharge preferences of patient/family/discharge partner  - Complete POLST form as appropriate  - Assess patient's ability to be responsible for managing their own health  - Refer to Case Management Department for coordinating discharge planning if the patient needs post-hospital services based on physician/LIP order or complex needs related to functional status, cognitive ability or social support system  Outcome: Progressing     Problem: Patient/Family Goals  Goal: Patient/Family Long Term Goal  Description: Patient's Long Term Goal: Will be able to walk well and independently with a walker and will have no complications from surgery    Interventions:  - Monitor vital signs and labs and refer as needed.  - Up as tolerated with walker, WBAT to right leg.  - Pain management with oral medication.  - PT/OT as ordered.  - Monitor incision for any signs of infection or bleeding.  -  Administer oral anticoagulant to prevent blood clots.  - Encourage use of incentive spirometry to prevent pneumonia.  - Follow up with surgery as recommended.  - See additional Care Plan goals for specific interventions  Outcome: Progressing  Goal: Patient/Family Short Term Goal  Description: Patient's Short Term Goal: To improve mobility and when passes therapy.    Interventions:   - Pain management with oral and IVP meds.  - PT/OT  - Follow MD orders   - Monitor vital signs and labs and refer as needed.  - Up as tolerated with walker, WBAT to right leg.  -  as Monitor incision for any signs of infection or bleeding.  - Administer oral anticoagulant to prevent blood clots. TEDS and SCDS to both legs.  - Encourage use of incentive spirometry to prevent pneumonia.  - See additional Care Plan goals for specific interventions  Outcome: Progressing     Problem: MUSCULOSKELETAL - ADULT  Goal: Return mobility to safest level of function  Description: INTERVENTIONS:  - Assess patient stability and activity tolerance for standing, transferring and ambulating w/ or w/o assistive devices  - Assist with transfers and ambulation using safe patient handling equipment as needed  - Ensure adequate protection for wounds/incisions during mobilization  - Obtain PT/OT consults as needed  - Advance activity as appropriate  - Communicate ordered activity level and limitations with patient/family  Outcome: Progressing  Goal: Maintain proper alignment of affected body part  Description: INTERVENTIONS:  - Support and protect limb and body alignment per provider's orders  - Instruct and reinforce with patient and family use of appropriate assistive device and precautions (e.g. spinal or hip dislocation precautions)  Outcome: Progressing

## 2025-02-15 NOTE — PROGRESS NOTES
[facility]  part of [facility]    Progress Note    Rossy Cano Patient Status:  Outpatient in a Bed    1933 MRN [id number]   Location [facility] 4W/SW/SE Attending Aly Cates,*   Hosp Day # 1 PCP Anthony Salinas MD     Chief Complaint: ok    Subjective:     Constitutional:  Positive for fatigue. Negative for appetite change and chills.   HENT:  Positive for hearing loss. Negative for congestion.    Respiratory:  Negative for choking and chest tightness.    Cardiovascular:  Negative for leg swelling.   Gastrointestinal:  Negative for abdominal pain and constipation.   Musculoskeletal:  Positive for joint swelling and joint pain.   Neurological:  Negative for weakness.   Psychiatric/Behavioral:  Positive for sleep disturbance. Negative for confusion and decreased concentration. The patient is not nervous/anxious.        Objective:   Blood pressure 133/60, pulse 96, temperature 98 °F (36.7 °C), temperature source Oral, resp. rate 16, height 5' 6\" (1.676 m), weight 170 lb (77.1 kg), SpO2 95%.  Physical Exam  Vitals and nursing note reviewed.   Constitutional:       General: She is not in acute distress.     Appearance: She is not ill-appearing, toxic-appearing or diaphoretic.   HENT:      Head: Normocephalic and atraumatic.   Cardiovascular:      Rate and Rhythm: Normal rate.      Pulses: Normal pulses.   Pulmonary:      Effort: Pulmonary effort is normal.      Breath sounds: Rhonchi present.   Abdominal:      General: Bowel sounds are normal.      Palpations: Abdomen is soft.   Musculoskeletal:         General: Swelling present.   Skin:     General: Skin is warm and dry.      Capillary Refill: Capillary refill takes less than 2 seconds.   Neurological:      Mental Status: She is alert.   Psychiatric:         Behavior: Behavior normal.         Judgment: Judgment normal.         Results:   Lab Results   Component Value Date    WBC 7.8 02/15/2025    HGB 10.2 (L)  02/15/2025    HCT 31.4 (L) 02/15/2025    .0 (L) 02/15/2025    CREATSERUM 0.66 02/15/2025    BUN 11 02/15/2025     02/15/2025    K 4.0 02/15/2025     02/15/2025    CO2 26.0 02/15/2025     (H) 02/15/2025    CA 8.6 (L) 02/15/2025    ALB 4.0 10/01/2024    ALKPHO 58 10/01/2024    BILT 0.9 10/01/2024    TP 6.4 10/01/2024    AST 27 10/01/2024    ALT 15 10/01/2024    TSH 1.166 10/01/2024    LIP 35 10/01/2024    MG 1.2 (L) 02/15/2025    PHOS 2.5 02/15/2025    TROP <0.045 04/23/2020    TROPHS 4 10/01/2024       No results found.        Assessment & Plan:     1.       Right hip primary osteoarthritis, status post right total hip arthroplasty, anterior approach.  Pain control. 2/13 pod#2 Monitor surgical wound and drain.  Spinal block.  Neurovascular checks.  Aspirin for DVT prophylaxis.  Physical and occupational therapy.    2.       Diabetes mellitus type 2.  Continue home medications.  Monitor Accu-Cheks.  Sliding scale insulin.  3.       Essential hypertension.  Continue home medications and monitor.  4.       Hyperlipidemia.  Continue home medications.    Dispo: home with [facility] vs thrive  5.  Urinary retention improved; retain only 247 ml     Patient will require inpatient services that will reasonably be expected to span two midnight's based on the clinical documentation in H+P.   Based on patients current state of illness, I anticipate that, after discharge, patient will require TBD.  Complex mdm; coordinating care with nurse/cm and counseling pt andwith her permission her son in room about rehab    JOHNNIE BYERS MD

## 2025-02-15 NOTE — PLAN OF CARE
Alert and oriented x4. POD 2. 1 assist walker. Purewick voiding minimal. Straight cathetered 900ml. Provena wound vac. Plan for rehab once cleared.  Problem: Patient Centered Care  Goal: Patient preferences are identified and integrated in the patient's plan of care  Description: Interventions:  - What would you like us to know as we care for you? From home with his grandson who works at daytime and will not be able to be there physically with her everyday but is her support system.  - Provide timely, complete, and accurate information to patient/family  - Incorporate patient and family knowledge, values, beliefs, and cultural backgrounds into the planning and delivery of care  - Encourage patient/family to participate in care and decision-making at the level they choose  - Honor patient and family perspectives and choices  Outcome: Progressing     Problem: PAIN - ADULT  Goal: Verbalizes/displays adequate comfort level or patient's stated pain goal  Description: INTERVENTIONS:  - Encourage pt to monitor pain and request assistance  - Assess pain using appropriate pain scale  - Administer analgesics based on type and severity of pain and evaluate response  - Implement non-pharmacological measures as appropriate and evaluate response  - Consider cultural and social influences on pain and pain management  - Manage/alleviate anxiety  - Utilize distraction and/or relaxation techniques  - Monitor for opioid side effects  - Notify MD/LIP if interventions unsuccessful or patient reports new pain  - Anticipate increased pain with activity and pre-medicate as appropriate  Outcome: Progressing     Problem: RISK FOR INFECTION - ADULT  Goal: Absence of fever/infection during anticipated neutropenic period  Description: INTERVENTIONS  - Monitor WBC  - Administer growth factors as ordered  - Implement neutropenic guidelines  Outcome: Progressing     Problem: SAFETY ADULT - FALL  Goal: Free from fall injury  Description:  INTERVENTIONS:  - Assess pt frequently for physical needs  - Identify cognitive and physical deficits and behaviors that affect risk of falls.  - Mikana fall precautions as indicated by assessment.  - Educate pt/family on patient safety including physical limitations  - Instruct pt to call for assistance with activity based on assessment  - Modify environment to reduce risk of injury  - Provide assistive devices as appropriate  - Consider OT/PT consult to assist with strengthening/mobility  - Encourage toileting schedule  Outcome: Progressing     Problem: DISCHARGE PLANNING  Goal: Discharge to home or other facility with appropriate resources  Description: INTERVENTIONS:  - Identify barriers to discharge w/pt and caregiver  - Include patient/family/discharge partner in discharge planning  - Arrange for needed discharge resources and transportation as appropriate  - Identify discharge learning needs (meds, wound care, etc)  - Arrange for interpreters to assist at discharge as needed  - Consider post-discharge preferences of patient/family/discharge partner  - Complete POLST form as appropriate  - Assess patient's ability to be responsible for managing their own health  - Refer to Case Management Department for coordinating discharge planning if the patient needs post-hospital services based on physician/LIP order or complex needs related to functional status, cognitive ability or social support system  Outcome: Progressing     Problem: Patient/Family Goals  Goal: Patient/Family Long Term Goal  Description: Patient's Long Term Goal: Will be able to walk well and independently with a walker and will have no complications from surgery    Interventions:  - Monitor vital signs and labs and refer as needed.  - Up as tolerated with walker, WBAT to right leg.  - Pain management with oral medication.  - PT/OT as ordered.  - Monitor incision for any signs of infection or bleeding.  - Administer oral anticoagulant to prevent  blood clots.  - Encourage use of incentive spirometry to prevent pneumonia.  - Follow up with surgery as recommended.  - See additional Care Plan goals for specific interventions  Outcome: Progressing  Goal: Patient/Family Short Term Goal  Description: Patient's Short Term Goal: To improve mobility and when passes therapy.    Interventions:   - Pain management with oral and IVP meds.  - PT/OT  - Follow MD orders   - Monitor vital signs and labs and refer as needed.  - Up as tolerated with walker, WBAT to right leg.  -  as Monitor incision for any signs of infection or bleeding.  - Administer oral anticoagulant to prevent blood clots. TEDS and SCDS to both legs.  - Encourage use of incentive spirometry to prevent pneumonia.  - See additional Care Plan goals for specific interventions  Outcome: Progressing     Problem: MUSCULOSKELETAL - ADULT  Goal: Return mobility to safest level of function  Description: INTERVENTIONS:  - Assess patient stability and activity tolerance for standing, transferring and ambulating w/ or w/o assistive devices  - Assist with transfers and ambulation using safe patient handling equipment as needed  - Ensure adequate protection for wounds/incisions during mobilization  - Obtain PT/OT consults as needed  - Advance activity as appropriate  - Communicate ordered activity level and limitations with patient/family  Outcome: Progressing  Goal: Maintain proper alignment of affected body part  Description: INTERVENTIONS:  - Support and protect limb and body alignment per provider's orders  - Instruct and reinforce with patient and family use of appropriate assistive device and precautions (e.g. spinal or hip dislocation precautions)  Outcome: Progressing

## 2025-02-15 NOTE — PHYSICAL THERAPY NOTE
PHYSICAL THERAPY HIP TREATMENT NOTE - INPATIENT    Room Number: 430/430-A            Presenting Problem: THR RLE  Co-Morbidities : Hx HTN, DM2    Problem List  Principal Problem:    Primary osteoarthritis of right hip  Active Problems:    Diabetes type 2, controlled (HCC)    Hypertension    Dyslipidemia      PHYSICAL THERAPY ASSESSMENT   Patient demonstrates good  progress this session, goals  remain in progress.      Patient is requiring minimal assist as a result of the following impairments: decreased functional strength, decreased endurance/aerobic capacity, impaired standing balance, decreased muscular endurance, and limited rt leg ROM.     Patient continues to function below baseline with bed mobility, transfers, and gait.  Next session anticipate patient to progress bed mobility, gait, stair negotiation, and performing household tasks.  Physical Therapy will continue to follow patient for duration of hospitalization.    Patient continues to benefit from continued skilled PT services: to promote return to prior level of function and safety with continuous assistance and gradual rehabilitative therapy .    PLAN DURING HOSPITALIZATION  Nursing Mobility Recommendation : 1 Assist  PT Device Recommendation: Rolling walker  PT Treatment Plan: Body mechanics;Endurance;Gait training;Patient education;Transfer training;Balance training  Frequency (Obs): Daily     SUBJECTIVE  Patient  ready for therapy.     OBJECTIVE  Precautions: Bed/chair alarm;ASHLEE - anterior (HOB >30 degrees)    WEIGHT BEARING RESTRICTION  R Lower Extremity: Weight Bearing as Tolerated      PAIN ASSESSMENT   Ratin  Location: no complains  Management Techniques: Activity promotion;Breathing techniques;Repositioning    BALANCE  Static Sitting: Good  Dynamic Sitting: Fair +  Static Standing: Fair  Dynamic Standing: Fair -  ACTIVITY TOLERANCE                         O2 WALK  Oxygen Therapy  SPO2% on Oxygen at Rest: 96  At rest oxygen flow (liters per  minute): 2  SPO2% Ambulation on Oxygen: 92  Ambulation oxygen flow (liters per minute): 2    AM-PAC '6-Clicks' INPATIENT SHORT FORM - BASIC MOBILITY  How much difficulty does the patient currently have...  Patient Difficulty: Turning over in bed (including adjusting bedclothes, sheets and blankets)?: A Little   Patient Difficulty: Sitting down on and standing up from a chair with arms (e.g., wheelchair, bedside commode, etc.): A Little   Patient Difficulty: Moving from lying on back to sitting on the side of the bed?: A Little   How much help from another person does the patient currently need...   Help from Another: Moving to and from a bed to a chair (including a wheelchair)?: A Little   Help from Another: Need to walk in hospital room?: A Little   Help from Another: Climbing 3-5 steps with a railing?: A Lot     AM-PAC Score:  Raw Score: 17   Approx Degree of Impairment: 50.57%   Standardized Score (AM-PAC Scale): 42.13   CMS Modifier (G-Code): CK    FUNCTIONAL ABILITY STATUS  Functional Mobility/Gait Assessment  Gait Assistance: Minimum assistance  Distance (ft): 60  Assistive Device: Cane  Pattern: R Decreased stance time  Rolling:  NT  Supine to Sit: minimal assist  Sit to Supine:  NT  Sit to Stand: minimal assist    Skilled Therapy Provided: chart reviewed and RN approved session. Patient in bed with RN and phlebotomist in room. Min A for bed mobility for rt leg assist. Sit few minutes at EOB no c/o with cues for correct posture. Transfers with cues for proper hand and rt leg placement. Patient amb with slow pace small shuffling steps. Gait pattern improved with cues and stand rest breaks.  Educated on post hip precaution and she needs cues to recall.     The patient's Approx Degree of Impairment: 50.57% has been calculated based on documentation in the [facility] '6 clicks' Inpatient Basic Mobility Short Form.  Research supports that patients with this level of impairment may benefit from GR.  Final disposition  will be made by interdisciplinary medical team.    Exercises AM Session PM Session   Ankle Pumps     25 reps  reps   Quad Sets 15 reps  reps   Glut Sets  reps  reps   Hip Abd/Add  reps  reps   Heel slides 15 reps  reps   Saq  reps  reps   Sitting Knee Extension 15 reps  reps   Standing heel/toe raises  reps  reps   Hamstring Curls  reps  reps   Forward, back steps  reps  reps   Short Squats  reps  reps     Patient End of Session: Up in chair;Hospital anti-slip socks;All patient questions and concerns addressed;RN aware of session/findings;Call light within reach;Needs met    CURRENT GOALS   Goals to be met by:   Patient Goal Patient's self-stated goal is: Return home    Goal #1 Patient is able to demonstrate supine - sit EOB @ level: modified independent   Goal #1   Current Status Min A   Goal #2 Patient is able to demonstrate transfers Sit to/from Stand at assistance level: modified independent     Goal #2  Current Status Min A   Goal #3 Patient is able to ambulate 300 feet with assistive device at assistance level: modified independent    Goal #3   Current Status 60 ft RW Min A   Goal #4 Patient will negotiate 2 stairs/one curb w/ assistive device and supervision   Goal #4   Current Status NT   Goal #5 Patient verbalizes and/or demonstrates all precautions and safety concerns independently   Goal #5   Current Status instructed   Goal #6 Patient independently performs home exercise program for ROM/strengthening per the instructions provided in preparation for discharge.   Goal #6  Current Status instructed     Gait Trainin minutes  Therapeutic Activity:  minutes  Neuromuscular Re-education:  minutes  Therapeutic Exercise: 15 minutes  Canalith Repositioning:  minutes  Manual Therapy:  minutes  Can add/delete any of these

## 2025-02-16 LAB
ANION GAP SERPL CALC-SCNC: 8 MMOL/L (ref 0–18)
BASOPHILS # BLD AUTO: 0.01 X10(3) UL (ref 0–0.2)
BASOPHILS NFR BLD AUTO: 0.1 %
BUN BLD-MCNC: 13 MG/DL (ref 9–23)
BUN/CREAT SERPL: 18.3 (ref 10–20)
CALCIUM BLD-MCNC: 8.5 MG/DL (ref 8.7–10.4)
CHLORIDE SERPL-SCNC: 105 MMOL/L (ref 98–112)
CO2 SERPL-SCNC: 26 MMOL/L (ref 21–32)
CREAT BLD-MCNC: 0.71 MG/DL
DEPRECATED RDW RBC AUTO: 41.9 FL (ref 35.1–46.3)
EGFRCR SERPLBLD CKD-EPI 2021: 80 ML/MIN/1.73M2 (ref 60–?)
EOSINOPHIL # BLD AUTO: 0.16 X10(3) UL (ref 0–0.7)
EOSINOPHIL NFR BLD AUTO: 1.9 %
ERYTHROCYTE [DISTWIDTH] IN BLOOD BY AUTOMATED COUNT: 12.9 % (ref 11–15)
GLUCOSE BLD-MCNC: 190 MG/DL (ref 70–99)
GLUCOSE BLDC GLUCOMTR-MCNC: 203 MG/DL (ref 70–99)
GLUCOSE BLDC GLUCOMTR-MCNC: 225 MG/DL (ref 70–99)
GLUCOSE BLDC GLUCOMTR-MCNC: 256 MG/DL (ref 70–99)
GLUCOSE BLDC GLUCOMTR-MCNC: 289 MG/DL (ref 70–99)
HCT VFR BLD AUTO: 30.3 %
HGB BLD-MCNC: 9.9 G/DL
IMM GRANULOCYTES # BLD AUTO: 0.02 X10(3) UL (ref 0–1)
IMM GRANULOCYTES NFR BLD: 0.2 %
LYMPHOCYTES # BLD AUTO: 0.99 X10(3) UL (ref 1–4)
LYMPHOCYTES NFR BLD AUTO: 11.7 %
MAGNESIUM SERPL-MCNC: 1.6 MG/DL (ref 1.6–2.6)
MCH RBC QN AUTO: 29 PG (ref 26–34)
MCHC RBC AUTO-ENTMCNC: 32.7 G/DL (ref 31–37)
MCV RBC AUTO: 88.9 FL
MONOCYTES # BLD AUTO: 0.72 X10(3) UL (ref 0.1–1)
MONOCYTES NFR BLD AUTO: 8.5 %
NEUTROPHILS # BLD AUTO: 6.59 X10 (3) UL (ref 1.5–7.7)
NEUTROPHILS # BLD AUTO: 6.59 X10(3) UL (ref 1.5–7.7)
NEUTROPHILS NFR BLD AUTO: 77.6 %
OSMOLALITY SERPL CALC.SUM OF ELEC: 293 MOSM/KG (ref 275–295)
PHOSPHATE SERPL-MCNC: 2.5 MG/DL (ref 2.4–5.1)
PLATELET # BLD AUTO: 153 10(3)UL (ref 150–450)
PLATELETS.RETICULATED NFR BLD AUTO: 3.1 % (ref 0–7)
POTASSIUM SERPL-SCNC: 4 MMOL/L (ref 3.5–5.1)
RBC # BLD AUTO: 3.41 X10(6)UL
SODIUM SERPL-SCNC: 139 MMOL/L (ref 136–145)
WBC # BLD AUTO: 8.5 X10(3) UL (ref 4–11)

## 2025-02-16 PROCEDURE — 99214 OFFICE O/P EST MOD 30 MIN: CPT | Performed by: HOSPITALIST

## 2025-02-16 RX ORDER — MAGNESIUM OXIDE 400 MG/1
400 TABLET ORAL ONCE
Status: COMPLETED | OUTPATIENT
Start: 2025-02-16 | End: 2025-02-16

## 2025-02-16 NOTE — PROGRESS NOTES
[facility]  part of [facility]    Progress Note    Rossy Cano Patient Status:  Outpatient in a Bed    1933 MRN [id number]   Location [facility] 4W/SW/SE Attending Aly Cates,*   Hosp Day # 1 PCP Anthony Salinas MD       Subjective:   Rossy Cano is a(n) 89 year old female postop day 2 status post high right total hip arthroplasty by Dr. Abdi.  She has no complaints of pain.  She is sitting in her chair.  Son is in the room with her.    Objective:   Blood pressure 145/64, pulse 90, temperature 98.4 °F (36.9 °C), temperature source Temporal, resp. rate 18, height 5' 6\" (1.676 m), weight 170 lb (77.1 kg), SpO2 90%.    General appearance:  alert, appears stated age, and cooperative  INCISION/WOUND: dressing intact and in place  Pulses: 2+ and symmetric  Neurologic: Grossly normal  Ortho Exam    Assessment and Plan:     Primary osteoarthritis of right hip  Doing well in physical therapy.  No complaints of pain.  Discharged home anticipated tomorrow.      Diabetes type 2, controlled (HCC)        Hypertension        Dyslipidemia          Results:     Lab Results   Component Value Date    WBC 8.5 2025    HGB 9.9 (L) 2025    HCT 30.3 (L) 2025    .0 2025    CREATSERUM 0.71 2025    BUN 13 2025     2025    K 4.0 2025     2025    CO2 26.0 2025     (H) 2025    CA 8.5 (L) 2025    ALB 4.0 10/01/2024    ALKPHO 58 10/01/2024    BILT 0.9 10/01/2024    TP 6.4 10/01/2024    AST 27 10/01/2024    ALT 15 10/01/2024    TSH 1.166 10/01/2024    LIP 35 10/01/2024    MG 1.6 2025    PHOS 2.5 2025    TROP <0.045 2020    TROPHS 4 10/01/2024       No results found.          GRACE LUCIO MD  2025

## 2025-02-16 NOTE — PROGRESS NOTES
[facility]  part of [facility]    Progress Note    Rossy Cano Patient Status:  Outpatient in a Bed    1933 MRN [id number]   Location [facility] 4W/SW/SE Attending Aly Cates,*   Hosp Day # 1 PCP Anthony Salinas MD     Chief Complaint: ok    Subjective:     Constitutional:  Positive for fatigue. Negative for appetite change and chills.   HENT:  Positive for hearing loss. Negative for congestion.    Respiratory:  Negative for choking and chest tightness.    Cardiovascular:  Negative for leg swelling.   Gastrointestinal:  Negative for abdominal pain and constipation.   Musculoskeletal:  Positive for joint swelling and joint pain.   Neurological:  Negative for weakness.   Psychiatric/Behavioral:  Positive for sleep disturbance. Negative for confusion and decreased concentration. The patient is not nervous/anxious.        Objective:   Blood pressure 145/64, pulse 90, temperature 98.4 °F (36.9 °C), temperature source Temporal, resp. rate 18, height 5' 6\" (1.676 m), weight 170 lb (77.1 kg), SpO2 90%.  Physical Exam  Vitals and nursing note reviewed.   Constitutional:       General: She is not in acute distress.     Appearance: She is not ill-appearing, toxic-appearing or diaphoretic.   HENT:      Head: Normocephalic and atraumatic.   Cardiovascular:      Rate and Rhythm: Normal rate.      Pulses: Normal pulses.   Pulmonary:      Effort: Pulmonary effort is normal.      Breath sounds: Rhonchi present.   Abdominal:      General: Bowel sounds are normal.      Palpations: Abdomen is soft.   Musculoskeletal:         General: Swelling present.   Skin:     General: Skin is warm and dry.      Capillary Refill: Capillary refill takes less than 2 seconds.   Neurological:      Mental Status: She is alert.   Psychiatric:         Behavior: Behavior normal.         Judgment: Judgment normal.         Results:   Lab Results   Component Value Date    WBC 8.5 2025    HGB 9.9  (L) 02/16/2025    HCT 30.3 (L) 02/16/2025    .0 02/16/2025    CREATSERUM 0.71 02/16/2025    BUN 13 02/16/2025     02/16/2025    K 4.0 02/16/2025     02/16/2025    CO2 26.0 02/16/2025     (H) 02/16/2025    CA 8.5 (L) 02/16/2025    ALB 4.0 10/01/2024    ALKPHO 58 10/01/2024    BILT 0.9 10/01/2024    TP 6.4 10/01/2024    AST 27 10/01/2024    ALT 15 10/01/2024    TSH 1.166 10/01/2024    LIP 35 10/01/2024    MG 1.6 02/16/2025    PHOS 2.5 02/16/2025    TROP <0.045 04/23/2020    TROPHS 4 10/01/2024       No results found.        Assessment & Plan:     1.       Right hip primary osteoarthritis, status post right total hip arthroplasty, anterior approach.  Pain control. 2/13 pod#3 Monitor surgical wound and drain.  Spinal block.  Neurovascular checks.  Aspirin for DVT prophylaxis.  Physical and occupational therapy.    2.       Diabetes mellitus type 2.  Continue home medications.  Monitor Accu-Cheks.  Sliding scale insulin.  3.       Essential hypertension.  Continue home medications and monitor.  4.       Hyperlipidemia.  Continue home medications.    Dispo: home with [facility] vs thrive  5.  Urinary retention improved; retain only 247 ml     Patient will require inpatient services that will reasonably be expected to span two midnight's based on the clinical documentation in H+P.   Based on patients current state of illness, I anticipate that, after discharge, patient will require TBD.  moderate mdm; coordinating care with nurse/cm and counseling pt and with her permission her son in room about rehab    JOHNNIE BYERS MD

## 2025-02-16 NOTE — PLAN OF CARE
Monitoring vital signs, stable at this time. Monitoring blood glucose levels. Insulin administered per order. Encouraging ambulation/ participation for PT/ OT. No acute changes at this moment. Safety and fall precautions in place, bed locked and in lowest position, call light in reach and non skid socks in place. Frequent rounding by nursing staff.     Problem: Patient Centered Care  Goal: Patient preferences are identified and integrated in the patient's plan of care  Description: Interventions:  - What would you like us to know as we care for you? From home with his grandson who works at daytime and will not be able to be there physically with her everyday but is her support system.  - Provide timely, complete, and accurate information to patient/family  - Incorporate patient and family knowledge, values, beliefs, and cultural backgrounds into the planning and delivery of care  - Encourage patient/family to participate in care and decision-making at the level they choose  - Honor patient and family perspectives and choices  Outcome: Progressing     Problem: PAIN - ADULT  Goal: Verbalizes/displays adequate comfort level or patient's stated pain goal  Description: INTERVENTIONS:  - Encourage pt to monitor pain and request assistance  - Assess pain using appropriate pain scale  - Administer analgesics based on type and severity of pain and evaluate response  - Implement non-pharmacological measures as appropriate and evaluate response  - Consider cultural and social influences on pain and pain management  - Manage/alleviate anxiety  - Utilize distraction and/or relaxation techniques  - Monitor for opioid side effects  - Notify MD/LIP if interventions unsuccessful or patient reports new pain  - Anticipate increased pain with activity and pre-medicate as appropriate  Outcome: Progressing     Problem: RISK FOR INFECTION - ADULT  Goal: Absence of fever/infection during anticipated neutropenic period  Description:  INTERVENTIONS  - Monitor WBC  - Administer growth factors as ordered  - Implement neutropenic guidelines  Outcome: Progressing     Problem: SAFETY ADULT - FALL  Goal: Free from fall injury  Description: INTERVENTIONS:  - Assess pt frequently for physical needs  - Identify cognitive and physical deficits and behaviors that affect risk of falls.  - [place] fall precautions as indicated by assessment.  - Educate pt/family on patient safety including physical limitations  - Instruct pt to call for assistance with activity based on assessment  - Modify environment to reduce risk of injury  - Provide assistive devices as appropriate  - Consider OT/PT consult to assist with strengthening/mobility  - Encourage toileting schedule  Outcome: Progressing     Problem: DISCHARGE PLANNING  Goal: Discharge to home or other facility with appropriate resources  Description: INTERVENTIONS:  - Identify barriers to discharge w/pt and caregiver  - Include patient/family/discharge partner in discharge planning  - Arrange for needed discharge resources and transportation as appropriate  - Identify discharge learning needs (meds, wound care, etc)  - Arrange for interpreters to assist at discharge as needed  - Consider post-discharge preferences of patient/family/discharge partner  - Complete POLST form as appropriate  - Assess patient's ability to be responsible for managing their own health  - Refer to Case Management Department for coordinating discharge planning if the patient needs post-hospital services based on physician/LIP order or complex needs related to functional status, cognitive ability or social support system  Outcome: Progressing     Problem: Patient/Family Goals  Goal: Patient/Family Long Term Goal  Description: Patient's Long Term Goal: Will be able to walk well and independently with a walker and will have no complications from surgery    Interventions:  - Monitor vital signs and labs and refer as needed.  - Up as  tolerated with walker, WBAT to right leg.  - Pain management with oral medication.  - PT/OT as ordered.  - Monitor incision for any signs of infection or bleeding.  - Administer oral anticoagulant to prevent blood clots.  - Encourage use of incentive spirometry to prevent pneumonia.  - Follow up with surgery as recommended.  - See additional Care Plan goals for specific interventions  Outcome: Progressing  Goal: Patient/Family Short Term Goal  Description: Patient's Short Term Goal: To improve mobility and when passes therapy.    Interventions:   - Pain management with oral and IVP meds.  - PT/OT  - Follow MD orders   - Monitor vital signs and labs and refer as needed.  - Up as tolerated with walker, WBAT to right leg.  -  as Monitor incision for any signs of infection or bleeding.  - Administer oral anticoagulant to prevent blood clots. TEDS and SCDS to both legs.  - Encourage use of incentive spirometry to prevent pneumonia.  - See additional Care Plan goals for specific interventions  Outcome: Progressing     Problem: MUSCULOSKELETAL - ADULT  Goal: Return mobility to safest level of function  Description: INTERVENTIONS:  - Assess patient stability and activity tolerance for standing, transferring and ambulating w/ or w/o assistive devices  - Assist with transfers and ambulation using safe patient handling equipment as needed  - Ensure adequate protection for wounds/incisions during mobilization  - Obtain PT/OT consults as needed  - Advance activity as appropriate  - Communicate ordered activity level and limitations with patient/family  Outcome: Progressing  Goal: Maintain proper alignment of affected body part  Description: INTERVENTIONS:  - Support and protect limb and body alignment per provider's orders  - Instruct and reinforce with patient and family use of appropriate assistive device and precautions (e.g. spinal or hip dislocation precautions)  Outcome: Progressing

## 2025-02-17 ENCOUNTER — TELEPHONE (OUTPATIENT)
Dept: ORTHOPEDICS CLINIC | Facility: CLINIC | Age: OVER 89
End: 2025-02-17

## 2025-02-17 VITALS
SYSTOLIC BLOOD PRESSURE: 122 MMHG | RESPIRATION RATE: 16 BRPM | HEIGHT: 66 IN | DIASTOLIC BLOOD PRESSURE: 62 MMHG | HEART RATE: 78 BPM | WEIGHT: 170 LBS | TEMPERATURE: 99 F | OXYGEN SATURATION: 94 % | BODY MASS INDEX: 27.32 KG/M2

## 2025-02-17 LAB
ANION GAP SERPL CALC-SCNC: 8 MMOL/L (ref 0–18)
BASOPHILS # BLD AUTO: 0.02 X10(3) UL (ref 0–0.2)
BASOPHILS NFR BLD AUTO: 0.3 %
BUN BLD-MCNC: 13 MG/DL (ref 9–23)
BUN/CREAT SERPL: 19.4 (ref 10–20)
CALCIUM BLD-MCNC: 8.3 MG/DL (ref 8.7–10.4)
CHLORIDE SERPL-SCNC: 104 MMOL/L (ref 98–112)
CO2 SERPL-SCNC: 26 MMOL/L (ref 21–32)
CREAT BLD-MCNC: 0.67 MG/DL
DEPRECATED RDW RBC AUTO: 44.1 FL (ref 35.1–46.3)
EGFRCR SERPLBLD CKD-EPI 2021: 82 ML/MIN/1.73M2 (ref 60–?)
EOSINOPHIL # BLD AUTO: 0.21 X10(3) UL (ref 0–0.7)
EOSINOPHIL NFR BLD AUTO: 2.9 %
ERYTHROCYTE [DISTWIDTH] IN BLOOD BY AUTOMATED COUNT: 12.8 % (ref 11–15)
GLUCOSE BLD-MCNC: 202 MG/DL (ref 70–99)
GLUCOSE BLDC GLUCOMTR-MCNC: 214 MG/DL (ref 70–99)
GLUCOSE BLDC GLUCOMTR-MCNC: 341 MG/DL (ref 70–99)
HCT VFR BLD AUTO: 29.6 %
HGB BLD-MCNC: 9.2 G/DL
IMM GRANULOCYTES # BLD AUTO: 0.02 X10(3) UL (ref 0–1)
IMM GRANULOCYTES NFR BLD: 0.3 %
LYMPHOCYTES # BLD AUTO: 0.89 X10(3) UL (ref 1–4)
LYMPHOCYTES NFR BLD AUTO: 12.2 %
MAGNESIUM SERPL-MCNC: 1.7 MG/DL (ref 1.6–2.6)
MCH RBC QN AUTO: 28.9 PG (ref 26–34)
MCHC RBC AUTO-ENTMCNC: 31.1 G/DL (ref 31–37)
MCV RBC AUTO: 93.1 FL
MONOCYTES # BLD AUTO: 0.62 X10(3) UL (ref 0.1–1)
MONOCYTES NFR BLD AUTO: 8.5 %
NEUTROPHILS # BLD AUTO: 5.56 X10 (3) UL (ref 1.5–7.7)
NEUTROPHILS # BLD AUTO: 5.56 X10(3) UL (ref 1.5–7.7)
NEUTROPHILS NFR BLD AUTO: 75.8 %
OSMOLALITY SERPL CALC.SUM OF ELEC: 292 MOSM/KG (ref 275–295)
PHOSPHATE SERPL-MCNC: 3 MG/DL (ref 2.4–5.1)
PLATELET # BLD AUTO: 159 10(3)UL (ref 150–450)
POTASSIUM SERPL-SCNC: 4.1 MMOL/L (ref 3.5–5.1)
RBC # BLD AUTO: 3.18 X10(6)UL
SODIUM SERPL-SCNC: 138 MMOL/L (ref 136–145)
WBC # BLD AUTO: 7.3 X10(3) UL (ref 4–11)

## 2025-02-17 PROCEDURE — 99214 OFFICE O/P EST MOD 30 MIN: CPT | Performed by: HOSPITALIST

## 2025-02-17 NOTE — PHYSICAL THERAPY NOTE
PHYSICAL THERAPY TREATMENT NOTE - INPATIENT     Room Number: 430/430-A       Presenting Problem: THR RLE  Co-Morbidities : Hx HTN, DM2    Problem List  Principal Problem:    Primary osteoarthritis of right hip  Active Problems:    Diabetes type 2, controlled (HCC)    Hypertension    Dyslipidemia      PHYSICAL THERAPY ASSESSMENT   Patient demonstrates good  progress this session, goals  remain in progress.      Patient is requiring contact guard assist as a result of the following impairments: decreased endurance/aerobic capacity, impaired standing balance, and decreased muscular endurance.     Patient continues to function below baseline with bed mobility, transfers, gait, stair negotiation, maintaining seated position, standing prolonged periods, and performing household tasks.  Next session anticipate patient to progress bed mobility, transfers, gait, stair negotiation, maintaining seated position, standing prolonged periods, and performing household tasks.  Physical Therapy will continue to follow patient for duration of hospitalization.    Patient continues to benefit from continued skilled PT services: at discharge to promote prior level of function and safety with additional support and return home with home health PT.    PLAN DURING HOSPITALIZATION  Nursing Mobility Recommendation : 1 Assist  PT Device Recommendation: Rolling walker  PT Treatment Plan: Body mechanics;Endurance;Gait training;Patient education;Transfer training;Balance training  Frequency (Obs): Daily     SUBJECTIVE  \"I can go home?\"    OBJECTIVE  Precautions: Bed/chair alarm;ASHLEE - anterior (HOB >30 degrees)    WEIGHT BEARING RESTRICTION  R Lower Extremity: Weight Bearing as Tolerated      PAIN ASSESSMENT   Ratin  Location: no complains  Management Techniques: Activity promotion;Breathing techniques;Repositioning    BALANCE  Static Sitting: Good  Dynamic Sitting: Fair +  Static Standing: Fair  Dynamic Standing: Fair -    ACTIVITY  TOLERANCE  Pulse: 78  Heart Rate Source: Monitor                    O2 WALK  Oxygen Therapy  SPO2% Ambulation on Room Air: 94 (ranging 91-94%)    AM-PAC '6-Clicks' INPATIENT SHORT FORM - BASIC MOBILITY  How much difficulty does the patient currently have...  Patient Difficulty: Turning over in bed (including adjusting bedclothes, sheets and blankets)?: A Little   Patient Difficulty: Sitting down on and standing up from a chair with arms (e.g., wheelchair, bedside commode, etc.): A Little   Patient Difficulty: Moving from lying on back to sitting on the side of the bed?: A Little   How much help from another person does the patient currently need...   Help from Another: Moving to and from a bed to a chair (including a wheelchair)?: A Little   Help from Another: Need to walk in hospital room?: A Little   Help from Another: Climbing 3-5 steps with a railing?: A Little     AM-PAC Score:  Raw Score: 18   Approx Degree of Impairment: 46.58%   Standardized Score (AM-PAC Scale): 43.63   CMS Modifier (G-Code): CK    FUNCTIONAL ABILITY STATUS  Functional Mobility/Gait Assessment  Gait Assistance: Contact guard assist  Distance (ft): 100ft; 25ft  Assistive Device: Rolling walker  Pattern:  (decreased juanjose speed, forward flexed posture. Cues for upright posture and safe management of RW with turns.)  Stairs: Stairs  How Many Stairs: 4  Device: 1 Rail  Assist: Contact guard assist  Pattern: Ascend and Descend  Ascend and Descend : Step to  Sit to Stand: contact guard assist. Cues for appropriate UE positioning with transitional movements. Instruction on pacing upon standing to allow body time to acclimate to change in position.     Skilled Therapy Provided: Patient received sitting in bedside chair with dtr present. RN approved activity. Therapist educated pt on POC and physiological benefits of mobilization. Cues for activity pacing, energy conservation and pursed lipped breathing technique. Reviewed post operative anterior  ASHLEE protocol and precautions. Patient with fair carryover. Patient agreeable to participate. Denies pain. *SpO2 on room air with activity 91-94% HR: 78bpm.     The patient's Approx Degree of Impairment: 46.58% has been calculated based on documentation in the [facility] '6 clicks' Inpatient Daily Activity Short Form.  Research supports that patients with this level of impairment may benefit from home with home PT and family support.  Final disposition will be made by interdisciplinary medical team.    Patient End of Session: Up in chair;Needs met;Call light within reach;RN aware of session/findings;Family present    CURRENT GOALS   Goals to be met by:   Patient Goal Patient's self-stated goal is: Return home    Goal #1 Patient is able to demonstrate supine - sit EOB @ level: modified independent   Goal #1   Current Status progressing   Goal #2 Patient is able to demonstrate transfers Sit to/from Stand at assistance level: modified independent     Goal #2  Current Status progressing   Goal #3 Patient is able to ambulate 300 feet with assistive device at assistance level: modified independent    Goal #3   Current Status progressing   Goal #4 Patient will negotiate 2 stairs/one curb w/ assistive device and supervision   Goal #4   Current Status progressing   Goal #5 Patient verbalizes and/or demonstrates all precautions and safety concerns independently   Goal #5   Current Status instructed   Goal #6 Patient independently performs home exercise program for ROM/strengthening per the instructions provided in preparation for discharge.   Goal #6  Current Status instructed     Gait Trainin minutes  Therapeutic Activity: 10 minutes

## 2025-02-17 NOTE — TELEPHONE ENCOUNTER
Patient's daughter calling to reschedule patient's 2/25 post op appointment to anytime after 9:30am same day or another day close to it. No appointment available until 3/3. Please call.

## 2025-02-17 NOTE — PLAN OF CARE
Rossy is alert x 4. Voiding freely. Ambulating with walker and 1 assist. Calls appropriately. Fall precautions are in place. Denies pain. Unable to wean off oxygen. Possible discharge with O2. Home health is the plan when medically cleared.    Problem: PAIN - ADULT  Goal: Verbalizes/displays adequate comfort level or patient's stated pain goal  Description: INTERVENTIONS:  - Encourage pt to monitor pain and request assistance  - Assess pain using appropriate pain scale  - Administer analgesics based on type and severity of pain and evaluate response  - Implement non-pharmacological measures as appropriate and evaluate response  - Consider cultural and social influences on pain and pain management  - Manage/alleviate anxiety  - Utilize distraction and/or relaxation techniques  - Monitor for opioid side effects  - Notify MD/LIP if interventions unsuccessful or patient reports new pain  - Anticipate increased pain with activity and pre-medicate as appropriate  Outcome: Adequate for Discharge     Problem: SAFETY ADULT - FALL  Goal: Free from fall injury  Description: INTERVENTIONS:  - Assess pt frequently for physical needs  - Identify cognitive and physical deficits and behaviors that affect risk of falls.  - [place] fall precautions as indicated by assessment.  - Educate pt/family on patient safety including physical limitations  - Instruct pt to call for assistance with activity based on assessment  - Modify environment to reduce risk of injury  - Provide assistive devices as appropriate  - Consider OT/PT consult to assist with strengthening/mobility  - Encourage toileting schedule  Outcome: Progressing     Problem: MUSCULOSKELETAL - ADULT  Goal: Return mobility to safest level of function  Description: INTERVENTIONS:  - Assess patient stability and activity tolerance for standing, transferring and ambulating w/ or w/o assistive devices  - Assist with transfers and ambulation using safe patient handling equipment  as needed  - Ensure adequate protection for wounds/incisions during mobilization  - Obtain PT/OT consults as needed  - Advance activity as appropriate  - Communicate ordered activity level and limitations with patient/family  Outcome: Progressing     Problem: DISCHARGE PLANNING  Goal: Discharge to home or other facility with appropriate resources  Description: INTERVENTIONS:  - Identify barriers to discharge w/pt and caregiver  - Include patient/family/discharge partner in discharge planning  - Arrange for needed discharge resources and transportation as appropriate  - Identify discharge learning needs (meds, wound care, etc)  - Arrange for interpreters to assist at discharge as needed  - Consider post-discharge preferences of patient/family/discharge partner  - Complete POLST form as appropriate  - Assess patient's ability to be responsible for managing their own health  - Refer to Case Management Department for coordinating discharge planning if the patient needs post-hospital services based on physician/LIP order or complex needs related to functional status, cognitive ability or social support system  Outcome: Progressing

## 2025-02-17 NOTE — TELEPHONE ENCOUNTER
Called Dmitry(on HIPAA) and patient needs later appointment. Moved patient to Dr Abdi's schedule on 2/25 at 2:20pm, advised that Meghann may still see patient that day. She had no further questions.

## 2025-02-17 NOTE — DISCHARGE SUMMARY
Dc summary#[id number]  > 30 min spent on dc    Hospital Discharge Diagnoses: right madeleine    Lace+ Score: 63  59-90 High Risk  29-58 Medium Risk  0-28   Low Risk.    TCM Follow-Up Recommendation:  LACE 29-58: Moderate Risk of readmission after discharge from the hospital.  Tcm fu recommended

## 2025-02-17 NOTE — TELEPHONE ENCOUNTER
Post-op call for Dr. Abdi    Date: 2/17/2025      Time: 4:47 PM   Patient: Rossy BRAGG number: [id number]   Surgery and surgery     Anterior Right Hip Arthroplasty with Fluoroscopy   date:2/13/2025  Patient unavailable.  Left message on voicemail to call clinic with questions or concerns.  Number was provided./ SPOKE TO PATIENT AND DAUGHTER  Patient's general feeling since discharge:/ I FEEL FINE  Pain control (0-10):  Pain Medication:  dose/strength/  Medication Quantity Refills Start End   acetaminophen 325 MG Oral Tab 60 tablet 0 2/14/2025 --   Sig:   Take 2 tablets (650 mg total) by mouth every 8 (eight) hours as needed. Maximum 4000 mg Tylenol       Medication Quantity Refills Start End   aspirin 325 MG Oral Tab EC 60 tablet 0 2/14/2025 --   Sig:   Take 1 tablet (325 mg to       Medication Quantity Refills Start End   HYDROcodone-acetaminophen 5-325 MG Oral Tab 25 tablet        Medication Quantity Refills Start End   naproxen 250 MG Oral Tab 28 tablet 0 2/14/2025 --   Sig:   Take 1 tablet (250 mg total)        Fever:  no  Chills:  no  SOB:  no  Incision site appearance:  Redness  no  Drainage no  Clean/dry/Intact yes   Calf pain, redness or warmth:    no   Bowel Regimen: yes LBM 2/16/25  If not, what are you taking stool softener/laxative?  Confirmed appointment date for post op:2/25/2025  Other concerns patients may ask: We went over ice application and protocol of 20min on 20min off multiple times a day  Bathing and bandages   Patient needs to keep incision clean and dry for the first week./DONE  Enforce rest, ice, compression elevation and use their pain medication accordingly./DONE  If the patient needs more pain medication, please put in a communication.

## 2025-02-17 NOTE — PLAN OF CARE
Pt cleared per PT/OT/ortho/hospitalist.  Pt ambulated on RA w/ cont. Pulse MD lucho aware in real time of results and cleared pt for home w/ no need for O2.  IS was taught and pt educated X10/hr.  All education complete and all questions answered both from pt and daughter.  Problem: Patient Centered Care  Goal: Patient preferences are identified and integrated in the patient's plan of care  Description: Interventions:  - What would you like us to know as we care for you? From home with his grandson who works at daytime and will not be able to be there physically with her everyday but is her support system.  - Provide timely, complete, and accurate information to patient/family  - Incorporate patient and family knowledge, values, beliefs, and cultural backgrounds into the planning and delivery of care  - Encourage patient/family to participate in care and decision-making at the level they choose  - Honor patient and family perspectives and choices  Outcome: Adequate for Discharge     Problem: RISK FOR INFECTION - ADULT  Goal: Absence of fever/infection during anticipated neutropenic period  Description: INTERVENTIONS  - Monitor WBC  - Administer growth factors as ordered  - Implement neutropenic guidelines  Outcome: Adequate for Discharge     Problem: SAFETY ADULT - FALL  Goal: Free from fall injury  Description: INTERVENTIONS:  - Assess pt frequently for physical needs  - Identify cognitive and physical deficits and behaviors that affect risk of falls.  - [place] fall precautions as indicated by assessment.  - Educate pt/family on patient safety including physical limitations  - Instruct pt to call for assistance with activity based on assessment  - Modify environment to reduce risk of injury  - Provide assistive devices as appropriate  - Consider OT/PT consult to assist with strengthening/mobility  - Encourage toileting schedule  Outcome: Adequate for Discharge     Problem: DISCHARGE PLANNING  Goal: Discharge to  home or other facility with appropriate resources  Description: INTERVENTIONS:  - Identify barriers to discharge w/pt and caregiver  - Include patient/family/discharge partner in discharge planning  - Arrange for needed discharge resources and transportation as appropriate  - Identify discharge learning needs (meds, wound care, etc)  - Arrange for interpreters to assist at discharge as needed  - Consider post-discharge preferences of patient/family/discharge partner  - Complete POLST form as appropriate  - Assess patient's ability to be responsible for managing their own health  - Refer to Case Management Department for coordinating discharge planning if the patient needs post-hospital services based on physician/LIP order or complex needs related to functional status, cognitive ability or social support system  Outcome: Adequate for Discharge     Problem: Patient/Family Goals  Goal: Patient/Family Long Term Goal  Description: Patient's Long Term Goal: Will be able to walk well and independently with a walker and will have no complications from surgery    Interventions:  - Monitor vital signs and labs and refer as needed.  - Up as tolerated with walker, WBAT to right leg.  - Pain management with oral medication.  - PT/OT as ordered.  - Monitor incision for any signs of infection or bleeding.  - Administer oral anticoagulant to prevent blood clots.  - Encourage use of incentive spirometry to prevent pneumonia.  - Follow up with surgery as recommended.  - See additional Care Plan goals for specific interventions  Outcome: Adequate for Discharge  Goal: Patient/Family Short Term Goal  Description: Patient's Short Term Goal: To improve mobility and when passes therapy.    Interventions:   - Pain management with oral and IVP meds.  - PT/OT  - Follow MD orders   - Monitor vital signs and labs and refer as needed.  - Up as tolerated with walker, WBAT to right leg.  -  as Monitor incision for any signs of infection or  bleeding.  - Administer oral anticoagulant to prevent blood clots. TEDS and SCDS to both legs.  - Encourage use of incentive spirometry to prevent pneumonia.  - See additional Care Plan goals for specific interventions  Outcome: Adequate for Discharge     Problem: MUSCULOSKELETAL - ADULT  Goal: Return mobility to safest level of function  Description: INTERVENTIONS:  - Assess patient stability and activity tolerance for standing, transferring and ambulating w/ or w/o assistive devices  - Assist with transfers and ambulation using safe patient handling equipment as needed  - Ensure adequate protection for wounds/incisions during mobilization  - Obtain PT/OT consults as needed  - Advance activity as appropriate  - Communicate ordered activity level and limitations with patient/family  Outcome: Adequate for Discharge  Goal: Maintain proper alignment of affected body part  Description: INTERVENTIONS:  - Support and protect limb and body alignment per provider's orders  - Instruct and reinforce with patient and family use of appropriate assistive device and precautions (e.g. spinal or hip dislocation precautions)  Outcome: Adequate for Discharge

## 2025-02-17 NOTE — CM/SW NOTE
02/17/25 1400   Discharge disposition   Expected discharge disposition Home-Health   Post Acute Care Provider [facility]   Discharge transportation Private car     MD DC order entered.   Called and spoke to intake at [facility]. Notified them of dc today.    Chayito Wallace RN, BSN  Nurse   [phone]

## 2025-02-19 NOTE — DISCHARGE SUMMARY
[facility]    PATIENT'S NAME: GEORGIA STRONG   ATTENDING PHYSICIAN: Aly Cates MD   PATIENT ACCOUNT#:   [id number]    LOCATION:  [address] [place] [address]  MEDICAL RECORD #:   [id number]       YOB: 1933  ADMISSION DATE:       02/13/2025      DISCHARGE DATE:  02/17/2025    DISCHARGE SUMMARY    More than 45 minutes were spent preparing this discharge.    DISCHARGE DIAGNOSIS:  Status post right total hip arthroplasty complicated by some postoperative hypoxia.    HISTORY AND HOSPITAL COURSE:  This is a very pleasant, 89-year-old, [facility] American, English-speaking female who presents with a history of hip arthritis that was not amenable to conservative management.  The patient had a right total hip arthroplasty performed and actually did fairly well with physical therapy.  She had some issues with hypoxia, and there was still some confusion of whether she would be able to go home or a rehab facility.  While insurance issues were being figured out, her son said that they would be able to private pay for a nursing home if I recommended it.  We watched her and she really did very well and her daughter volunteered to stay with her at home, so given this I was able to discharge her home to follow up as an outpatient with home health, PT, and OT.    PHYSICAL EXAMINATION ON DISCHARGE:    VITAL SIGNS:  Temperature is 98.5, pulse 70, respirations 16, blood pressure 122/62.  She is 94% of oxygen with heart rate of 78 while walking.  LUNGS:  Clear.  HEART:  Normal S1, S2.  No S3.  ABDOMEN:  Soft and nontender.  EXTREMITIES:  No neurovascular compromise.  NEUROLOGIC:  She is alert and oriented, friendly and cooperative.    LABORATORY STUDIES:  Please see chart.    ASSESSMENT AND PLAN:    1.   Status post right total hip arthroplasty.  Patient is doing well.  Follow up with home health, PT, and OT.  2.   Type 2 diabetes.  3.   Hypertension.  4.   Hyperlipidemia.  5.   Urinary retention, improved.   Retained only 247 mL or less, safe to go home.     CONDITION ON DISCHARGE:  Stable.    CODE STATUS:  DNAR Select.    ACTIVITY:  PT/OT as tolerated.    DIET:  Low-fat, low-salt, 2000-calorie ADA.    FOLLOWUP:  With Dr. Abdi for wound care and activity orders.  Dr. Salinas, please call in 3 days for followup advice.  Send home with incentive spirometry and return if bleeding, chest pain, shortness of breath, or other neurologic complaints in the leg.     MEDICATIONS ON DISCHARGE:    1.   Cranberry extract daily.  2.   Lutein extract daily.  3.   Amlodipine 5 mg daily.  4.   Lipitor 40 mg daily.  Watch for muscle weakness.  5.   Gabapentin 100 mg 3 times a daily.  6.   Hydrochlorothiazide 12.5 mg 3 times a week.  7.   Lantus 20 units nightly.  8.   Lisinopril 40 mg daily.  9.   Short-acting insulin KwikPen 5 units 3 times a day.  The brand name is Lyumjev.  10.   Metformin 500 mg twice a day.  Stop if nausea or vomiting.  11.   Metoprolol 100 mg daily.  12.   Multivitamin once a day.  13.   Tylenol 650  mg every 8 hours as needed.  14.   Ecotrin 325 mg twice a day at bedtime to continue as long as Dr. Abdi wishes and then consider return to 81 mg a day after that point.  15.   Calcium carbonate and vitamin D 250/3.125 twice a day.  16.   Colace 100 mg p.o. b.i.d.  17.   Norco 5/325 one tablet every 6 hours as needed.  Watch total daily Tylenol, limit to 3 g.  18.   Naprosyn 250 mg twice a day with food.  Please stop with upset stomach or bleeding.  Will need to check kidney function.    RISK OF READMISSION:  Moderate.  TCM followup is recommended.    Dictated By Aly Cates MD  d: 02/17/2025 15:51:59  t: 02/17/2025 16:00:55  Job [id number]  [facility]/

## 2025-02-25 ENCOUNTER — OFFICE VISIT (OUTPATIENT)
Dept: ORTHOPEDICS CLINIC | Facility: CLINIC | Age: OVER 89
End: 2025-02-25

## 2025-02-25 ENCOUNTER — HOSPITAL ENCOUNTER (OUTPATIENT)
Dept: GENERAL RADIOLOGY | Facility: HOSPITAL | Age: OVER 89
Discharge: HOME | End: 2025-02-25
Attending: ORTHOPAEDIC SURGERY
Payer: MEDICARE

## 2025-02-25 DIAGNOSIS — Z47.89 ORTHOPEDIC AFTERCARE: ICD-10-CM

## 2025-02-25 DIAGNOSIS — M16.11 PRIMARY OSTEOARTHRITIS OF RIGHT HIP: Primary | ICD-10-CM

## 2025-02-25 DIAGNOSIS — Z96.641 S/P HIP REPLACEMENT, RIGHT: ICD-10-CM

## 2025-02-25 DIAGNOSIS — M25.551 RIGHT HIP PAIN: ICD-10-CM

## 2025-02-25 PROCEDURE — 73502 X-RAY EXAM HIP UNI 2-3 VIEWS: CPT | Performed by: ORTHOPAEDIC SURGERY

## 2025-02-25 PROCEDURE — 99024 POSTOP FOLLOW-UP VISIT: CPT

## 2025-02-25 NOTE — PROGRESS NOTES
NURSING INTAKE COMMENTS:   Chief Complaint   Patient presents with    Post-Op     1st POV Anterior right hip arthroplasty, sx 2/13/2025. Declines pain.             HPI: This 89 year old female presents today with her daughter approximately 1.5 weeks status post right total hip replacement.  Overall patient reports she is doing well.  Denies any pain today.  Denies any fever, chills, night sweats.  Denies any numbness or tingling.  Reports that her leg lengths are equal.  She is doing well in physical therapy.  She is ambulating with a rolling walker.  She is not taking anything for pain currently.  Taking her aspirin twice daily for anticoagulation.    Past Medical History:    Arthritis    Diabetes (HCC)    High blood pressure    High cholesterol    Hx of motion sickness    Hyperlipidemia    Incontinence    Osteoarthritis    Schatzki's ring    Unspecified essential hypertension    UTI (urinary tract infection)    Visual impairment     Past Surgical History:   Procedure Laterality Date    Anesth,shoulder replacement Left 11/02/16    DR LEWIS    Appendectomy      Appendectomy      Colonoscopy      D & c      Dilation/curettage,diagnostic      Egd  3/14     Current Outpatient Medications   Medication Sig Dispense Refill    multivitamin Oral Tab Take 1 tablet by mouth daily. 60 tablet 0    acetaminophen 325 MG Oral Tab Take 2 tablets (650 mg total) by mouth every 8 (eight) hours as needed. Maximum 4000 mg Tylenol/acetaminophen daily from all sources.  Each Norco has 325 mg of Tylenol/acetaminophen in it. 60 tablet 0    aspirin 325 MG Oral Tab EC Take 1 tablet (325 mg total) by mouth in the morning and 1 tablet (325 mg total) before bedtime. Do not crush. 60 tablet 0    calcium carbonate-vitamin D 250-3.125 MG-MCG Oral Tab Take 1 tablet by mouth 2 (two) times daily with meals. 60 tablet 0    docusate sodium 100 MG Oral Cap Take 100 mg by mouth 2 (two) times daily. Do not crush. Stop if loose stool 20 capsule 0     naproxen 250 MG Oral Tab Take 1 tablet (250 mg total) by mouth 2 (two) times daily with meals. Take with food.  Stop if stomach upset. 28 tablet 0    CRANBERRY OR Take 1 tablet by mouth daily.      LYUMJEV KWIKPEN 100 UNIT/ML Subcutaneous Solution Pen-injector Inject 5 Units into the skin in the morning, at noon, and at bedtime.      amLODIPine 5 MG Oral Tab Take 1 tablet (5 mg total) by mouth daily.      hydroCHLOROthiazide 12.5 MG Oral Tab Take 1 tablet (12.5 mg total) by mouth 3 (three) times a week. Take 1 on Monday, Wednesday, ans Friday.      LUTEIN OR Take 1 tablet by mouth daily.        MetFORMIN HCl  MG Oral Tablet 24 Hr Take 1 tablet (500 mg total) by mouth 2 (two) times daily with meals.      LANTUS SOLOSTAR 100 UNIT/ML Subcutaneous Solution Pen-injector Inject 20 Units into the skin nightly.      Insulin Syringes, Disposable, U-100 0.5 ML Does not apply Misc       FreeStyle Lancets Does not apply Misc       Glucose Blood In Vitro Strip       Atorvastatin Calcium (LIPITOR) 40 MG Oral Tab Take 1 tablet (40 mg total) by mouth daily.      lisinopril (PRINIVIL,ZESTRIL) 40 MG Oral Tab Take 1 tablet (40 mg total) by mouth daily.      metoprolol succinate  MG Oral Tablet 24 Hr Take 1 tablet (100 mg total) by mouth daily.      Multiple Vitamins-Minerals (MULTI FOR HER 50+) Oral Cap Take 1 tablet by mouth.        HYDROcodone-acetaminophen 5-325 MG Oral Tab Take 1 tablet by mouth every 6 (six) hours as needed. No alcohol or driving on this med. Stop if lethargic or hallucinating.  Maximum 4000 mg Tylenol/acetaminophen daily from all sources.  Each Norco has 325 mg of Tylenol/acetaminophen in it. (Patient not taking: Reported on 2/25/2025) 25 tablet 0    gabapentin 100 MG Oral Cap Take 1 capsule (100 mg total) by mouth 3 (three) times daily. (Patient not taking: Reported on 2/25/2025)       Allergies[1]  Family History   Problem Relation Age of Onset    Diabetes Father     Stroke Father     Diabetes  Mother     Cancer Brother         liver    Cancer Other         family h/o of prostate     No family Hx of DVT/PE    Social History     Occupational History    Not on file   Tobacco Use    Smoking status: Former     Current packs/day: 0.00     Types: Cigarettes     Quit date:      Years since quittin.1    Smokeless tobacco: Never   Vaping Use    Vaping status: Never Used   Substance and Sexual Activity    Alcohol use: Not Currently     Comment: occasionally    Drug use: No    Sexual activity: Not on file        Review of Systems:  GENERAL: feels generally well, no significant weight loss or weight gain  SKIN: no ulcerated or worrisome skin lesions  EYES:denies blurred vision or double vision  HEENT: denies new nasal congestion, sinus pain or ST  LUNGS: denies shortness of breath  CARDIOVASCULAR: denies chest pain  GI: no hematemesis, no worsening heartburn, no diarrhea  : no dysuria, no blood in urine, no difficulty urinating, no incontinence  MUSCULOSKELETAL: no other musculoskeletal complaints other than in HPI  NEURO: no numbness or tingling, no weakness or balance disorder  PSYCHE: no depression or anxiety  HEMATOLOGIC: no hx of blood dyscrasia, no Hx DVT/PE  ENDOCRINE: no thyroid or diabetes issues  ALL/ASTHMA: no new hx of severe allergy or asthma    Physical Examination:    There were no vitals taken for this visit.  Constitutional: appears well hydrated, alert and responsive, no acute distress noted  Extremities: Lower extremity skin healthy, 1+ pitting edema.  Calf soft nontender.  Incisions healing well, staples removed today.  Musculoskeletal: Leg lengths equal.  Able to straight leg raise against resistance.  Will dorsiflex and plantarflex against resistance.  Internal and external rotation 15 degrees does not cause any groin pain.  Neurological: Motor and sensory function intact.    Imaging: Both Dr. Abdi and I reviewed x-rays taken today in office.  X-rays show implants are well-fixed  and in proper alignment.    XR FLUOROSCOPY C-ARM TIME LESS THAN 1 HOUR (CPT=76000)    Result Date: 2/14/2025  PROCEDURE: XR FLUORO PHYSICIAN TIME< 1 HOUR (CPT=76000)  COMPARISON: None.  INDICATIONS: Right hip osteoarthritis RT hip total arthroplasty  TECHNIQUE:   FLUOROSCOPY IMAGES OBTAINED:  6 FLUOROSCOPY TIME: 36.6 seconds RADIATION DOSE (Dose Area Product):  0.63803-yLw*m^2  FINDINGS:  Fluoroscopic guidance utilized during right hip arthroplasty.  See procedure note          CONCLUSION:  1. Fluoroscopic guidance utilized during right hip arthroplasty procedure. 2. See procedure note    Dictated by (CST): Zoran Nettles MD on 2/14/2025 at 10:36 AM     Finalized by (CST): Zoran Nettles MD on 2/14/2025 at 10:38 AM          XR HIP W OR WO PELVIS 2 OR 3 VIEWS, RIGHT (CPT=73502)    Result Date: 2/13/2025  PROCEDURE: XR HIP W OR WO PELVIS 2 OR 3 VIEWS, RIGHT (CPT=73502)  COMPARISON: [facility] 2nd Floor, XR HIP W OR WO PELVIS 2 OR 3 VIEWS, RIGHT (CPT=73502), 4/02/2024, 10:26 AM.  INDICATIONS: Status post right total hip arthroplasty  TECHNIQUE: 3 views were obtained.   FINDINGS:   The patient is status post recent right hip arthroplasty with femoral and acetabular components in expected position.  There is edema and subcutaneous emphysema in the surrounding soft tissues compatible with recent postsurgical state.          CONCLUSION: Postsurgical changes compatible with recent right hip arthroplasty.    Dictated by (CST): Yonatan Taylor MD on 2/13/2025 at 11:46 AM     Finalized by (CST): Yonatan Taylor MD on 2/13/2025 at 11:46 AM             Lab Results   Component Value Date    WBC 7.3 02/17/2025    HGB 9.2 (L) 02/17/2025    .0 02/17/2025      Lab Results   Component Value Date     (H) 02/17/2025    BUN 13 02/17/2025    CREATSERUM 0.67 02/17/2025    GFRNAA 49 (L) 04/10/2022    GFRAA 57 (L) 04/10/2022        Assessment and Plan:  Diagnoses and all orders for this  visit:    Primary osteoarthritis of right hip  -     Physical Therapy Referral - [facility]  -     Physical Therapy Referral - [facility]    Orthopedic aftercare  -     XR HIP W OR WO PELVIS 2 OR 3 VIEWS, RIGHT (CPT=73502) [[id number]] - Orthopedic providers only; Future  -     Physical Therapy Referral - [facility]  -     Physical Therapy Referral - [facility]    Right hip pain        Assessment: As above    Plan: Patient is doing very well.  Discussed wound care medications with both the patient and the daughter.  Discussed with the patient should begin wearing compression socks during the day but not at night.  She will continue working with therapy.  New prescription given today.    Patient will see us in 4 weeks for repeat x-rays and reevaluations.  Discussed with the patient and her daughter that Dr. Abdi and I are leaving this clinic, her last day being March 14.  Discussed she is more than welcome to follow-up with us in a private office or follow-up with a new clinic doctors.    Follow Up: No follow-ups on file.    PURNIMA Sandy       [1]   Allergies  Allergen Reactions    Bactrim [Sulfamethoxazole W/Trimethoprim] RASH     rash

## 2025-03-13 ENCOUNTER — OFFICE VISIT (OUTPATIENT)
Dept: PODIATRY CLINIC | Facility: CLINIC | Age: OVER 89
End: 2025-03-13

## 2025-03-13 DIAGNOSIS — E11.42 TYPE 2 DIABETES MELLITUS WITH POLYNEUROPATHY (HCC): Primary | ICD-10-CM

## 2025-03-13 DIAGNOSIS — I73.9 PVD (PERIPHERAL VASCULAR DISEASE): ICD-10-CM

## 2025-03-13 NOTE — PROGRESS NOTES
Reason for Visit      Rossy Cano is a 89 year old female presents today complaining of bilateral diabetic foot evaluation.     History of Present Illness     Patient presents to clinic today after last being seen by podiatry on 8/29/2024 for diabetic foot evaluation.  Patient is a non-insulin-dependent diabetic type II whose last A1c was 8.6.  Patient presents to clinic today complaining of elongated, thickened toenails that she is unable to debride himself.    Patient returns to clinic today for routine diabetic footcare after last being seen by me on December 5, 2024.    The following portions of the patient's history were reviewed and updated as appropriate: allergies, current medications, past family history, past medical history, past social history, past surgical history and problem list.    Allergies[1]      Current Outpatient Medications:     multivitamin Oral Tab, Take 1 tablet by mouth daily., Disp: 60 tablet, Rfl: 0    acetaminophen 325 MG Oral Tab, Take 2 tablets (650 mg total) by mouth every 8 (eight) hours as needed. Maximum 4000 mg Tylenol/acetaminophen daily from all sources.  Each Norco has 325 mg of Tylenol/acetaminophen in it., Disp: 60 tablet, Rfl: 0    aspirin 325 MG Oral Tab EC, Take 1 tablet (325 mg total) by mouth in the morning and 1 tablet (325 mg total) before bedtime. Do not crush., Disp: 60 tablet, Rfl: 0    calcium carbonate-vitamin D 250-3.125 MG-MCG Oral Tab, Take 1 tablet by mouth 2 (two) times daily with meals., Disp: 60 tablet, Rfl: 0    docusate sodium 100 MG Oral Cap, Take 100 mg by mouth 2 (two) times daily. Do not crush. Stop if loose stool, Disp: 20 capsule, Rfl: 0    HYDROcodone-acetaminophen 5-325 MG Oral Tab, Take 1 tablet by mouth every 6 (six) hours as needed. No alcohol or driving on this med. Stop if lethargic or hallucinating.  Maximum 4000 mg Tylenol/acetaminophen daily from all sources.  Each Norco has 325 mg of Tylenol/acetaminophen in it. (Patient not taking:  Reported on 2/25/2025), Disp: 25 tablet, Rfl: 0    naproxen 250 MG Oral Tab, Take 1 tablet (250 mg total) by mouth 2 (two) times daily with meals. Take with food.  Stop if stomach upset., Disp: 28 tablet, Rfl: 0    CRANBERRY OR, Take 1 tablet by mouth daily., Disp: , Rfl:     LYUMJEV KWIKPEN 100 UNIT/ML Subcutaneous Solution Pen-injector, Inject 5 Units into the skin in the morning, at noon, and at bedtime., Disp: , Rfl:     amLODIPine 5 MG Oral Tab, Take 1 tablet (5 mg total) by mouth daily., Disp: , Rfl:     gabapentin 100 MG Oral Cap, Take 1 capsule (100 mg total) by mouth 3 (three) times daily. (Patient not taking: Reported on 2/25/2025), Disp: , Rfl:     hydroCHLOROthiazide 12.5 MG Oral Tab, Take 1 tablet (12.5 mg total) by mouth 3 (three) times a week. Take 1 on Monday, Wednesday, ans Friday., Disp: , Rfl:     LUTEIN OR, Take 1 tablet by mouth daily.  , Disp: , Rfl:     MetFORMIN HCl  MG Oral Tablet 24 Hr, Take 1 tablet (500 mg total) by mouth 2 (two) times daily with meals., Disp: , Rfl:     LANTUS SOLOSTAR 100 UNIT/ML Subcutaneous Solution Pen-injector, Inject 20 Units into the skin nightly., Disp: , Rfl:     Insulin Syringes, Disposable, U-100 0.5 ML Does not apply Misc, , Disp: , Rfl:     FreeStyle Lancets Does not apply Misc, , Disp: , Rfl:     Glucose Blood In Vitro Strip, , Disp: , Rfl:     Atorvastatin Calcium (LIPITOR) 40 MG Oral Tab, Take 1 tablet (40 mg total) by mouth daily., Disp: , Rfl:     lisinopril (PRINIVIL,ZESTRIL) 40 MG Oral Tab, Take 1 tablet (40 mg total) by mouth daily., Disp: , Rfl:     metoprolol succinate  MG Oral Tablet 24 Hr, Take 1 tablet (100 mg total) by mouth daily., Disp: , Rfl:     Multiple Vitamins-Minerals (MULTI FOR HER 50+) Oral Cap, Take 1 tablet by mouth.  , Disp: , Rfl:     There are no discontinued medications.    Patient Active Problem List   Diagnosis    Recurrent UTI    Microhematuria    Urethral syndrome    Primary osteoarthritis, left shoulder     Diabetes type 2, controlled (HCC)    Hypertension    Dyslipidemia    Community acquired pneumonia of left lower lobe of lung    Hypoxia    Weakness generalized    Acute cystitis without hematuria    Hyperglycemia    Acute hypoxemic respiratory failure (HCC)    Syncope and collapse    Nausea and vomiting in adult    Aspiration pneumonitis (HCC)    Hypoxemia    Primary osteoarthritis of right hip       Past Medical History:    Arthritis    Diabetes (HCC)    High blood pressure    High cholesterol    Hx of motion sickness    Hyperlipidemia    Incontinence    Osteoarthritis    Schatzki's ring    Unspecified essential hypertension    UTI (urinary tract infection)    Visual impairment       Past Surgical History:   Procedure Laterality Date    Anesth,shoulder replacement Left 16    DR LEWIS    Appendectomy      Appendectomy      Colonoscopy      D & c      Dilation/curettage,diagnostic      Egd  3/14       Family History   Problem Relation Age of Onset    Diabetes Father     Stroke Father     Diabetes Mother     Cancer Brother         liver    Cancer Other         family h/o of prostate       Social History     Occupational History    Not on file   Tobacco Use    Smoking status: Former     Current packs/day: 0.00     Types: Cigarettes     Quit date:      Years since quittin.2    Smokeless tobacco: Never   Vaping Use    Vaping status: Never Used   Substance and Sexual Activity    Alcohol use: Not Currently     Comment: occasionally    Drug use: No    Sexual activity: Not on file       ROS     Constitutional: negative for chills, fevers and sweats  Gastrointestinal: negative for abdominal pain, diarrhea, nausea and vomiting  Genitourinary:negative for dysuria and hematuria  Musculoskeletal:negative for arthralgias and muscle weakness  Neurological: negative for paresthesia and weakness  All others reviewed and negative.      Physical Exam     LE PHYSICAL EXAM    Constitution: Well-developed and  well-nourished. Gait appears normal. No apparent distress. Alert and oriented to person, place, and time.  Integument: There are no varicosities. Skin appears moist, warm, and supple with positive hair growth. There are no color changes. No open lesions. No macerations, No Hyperkeratotic lesions.  Vascular examination: Dorsalis pedis and posterior tibial pulses are strong bilaterally with capillary filling time less than 3 seconds to all digits. There is no peripheral edema..  Neurological Sensorium: Grossly intact to sharp/dull. Vibratory: Intact.  Musculoskeletal:   5/5 pedal muscle strength b/l     Advanced trophic changes as: hair growth decrease  nail changes  (thickening) pigmentary changes (discoloration) skin texture (thin, shiny)   Procedure       Nails 1 through 5 bilateral debrided with use of a nail nipper.  Patient Toller procedure well had no complications.  Neurovascular status intact to the level of the digits post procedure.     Assessment and Plan     Encounter Diagnoses   Name Primary?    Type 2 diabetes mellitus with polyneuropathy (HCC) Yes    PVD (peripheral vascular disease)      Diabetic education and instructions have been provided. We reviewed and discussed the following:    -risk categories related to pts with diabetes and foot or lower extremity complications per ADA.    -adherence to medication regimen and close monitoring or blood sugar control.   -daily monitoring/inspection of feet and shoes.   -proper management of diet and weight   -regular follow up with PCP and specialty providers as recommended   -Lower extremity complications related to DM were reviewed and stressed prevention.           Nails 1 through 5 bilateral debrided with use of a nail nipper.  Patient Toller procedure well had no complications.  Neurovascular status intact to the level of the digits post procedure.     Patient was instructed to call the office or on-call podiatric physician immediately with any issues or  concerns before the next scheduled visit. Patient to follow-up in clinic in 3 months      Teodora Olivas DPM, VISHAL.[email or web address] ARCOS  Diplomat, American Board of Foot and Ankle Surgery  Certified in Foot and Rearfoot/Ankle Reconstruction  Fellow of the American College of Foot and Ankle Surgeons  Fellowship Trained Foot and Ankle Surgeon   [facility]     12/5/2024    2:53 PM         [1]   Allergies  Allergen Reactions    Bactrim [Sulfamethoxazole W/Trimethoprim] RASH     rash

## 2025-03-25 ENCOUNTER — OFFICE VISIT (OUTPATIENT)
Age: OVER 89
End: 2025-03-25
Payer: COMMERCIAL

## 2025-03-25 DIAGNOSIS — Z96.641 S/P HIP REPLACEMENT, RIGHT: Primary | ICD-10-CM

## 2025-03-25 PROCEDURE — 99024 POSTOP FOLLOW-UP VISIT: CPT | Performed by: ORTHOPAEDIC SURGERY

## 2025-03-25 NOTE — PROGRESS NOTES
Chief Complaint   Patient presents with    Post-Op     2nd  POV - Anterior right hip arthroplasty, sx 2/13/2025 -  Pt is healing well, denies pain. Pt hat PT with home health.        HPI  Rossy Cano is a 89 year old female being seen in the office today for follow up of right total hip arthroplasty performed on 2/13/2025 by Dr. Abdi.  She is here with her daughter.  She is doing well.  She has no pain.  She is walking with a walker.  She denies fevers chills or wound issues.  She is getting home physical therapy once a week.         The following portions of the patient's history were reviewed [unfilled] and updated as appropriate: allergies, current medications, past family history, past medical history, past social history, past surgical history and problem list.      Physical Exam  There were no vitals taken for this visit.  There is no height or weight on file to calculate BMI.  Ortho Exam  Right hip exam demonstrates a well-healed surgical scar.  Chiselm obvious swelling patient obvious deformity.  He has no tenderness to palpation.  Range of motion the hip is intact with any pain to the groin or thigh.  She walks with a nonantalgic gait with a walker.  Distally neurovascularly intact.    Imaging:  None new today    Assessment and Plan  Assessment & Plan  S/P hip replacement, right           I discussed her recovery from her hip replacement with her and her daughter.  She is doing very well from an orthopedic standpoint.  Treatment options were discussed with the patient at length. Specifically, I recommend the following:    She can continue using activity modification, rest, ice, compression, elevation, anti-inflammatories/Tylenol for symptoms.  Continue home physical therapy and transition into a home program  Activities: As tolerated, no restrictions  Follow up: 2 months time with repeat x-rays of the right hip  The patient understands and agrees with this plan. All of the patient's questions were  answered today.       No follow-ups on file.    [unfilled]    India Guo MD

## 2025-05-27 ENCOUNTER — OFFICE VISIT (OUTPATIENT)
Age: OVER 89
End: 2025-05-27
Payer: MEDICARE

## 2025-05-27 ENCOUNTER — HOSPITAL ENCOUNTER (OUTPATIENT)
Dept: GENERAL RADIOLOGY | Facility: HOSPITAL | Age: OVER 89
Discharge: HOME | End: 2025-05-27
Attending: ORTHOPAEDIC SURGERY
Payer: MEDICARE

## 2025-05-27 DIAGNOSIS — Z47.89 ORTHOPEDIC AFTERCARE: Primary | ICD-10-CM

## 2025-05-27 DIAGNOSIS — Z96.641 S/P HIP REPLACEMENT, RIGHT: ICD-10-CM

## 2025-05-27 DIAGNOSIS — Z47.89 ORTHOPEDIC AFTERCARE: ICD-10-CM

## 2025-05-27 PROCEDURE — 99214 OFFICE O/P EST MOD 30 MIN: CPT | Performed by: ORTHOPAEDIC SURGERY

## 2025-05-27 PROCEDURE — 73502 X-RAY EXAM HIP UNI 2-3 VIEWS: CPT | Performed by: ORTHOPAEDIC SURGERY

## 2025-05-27 NOTE — PROGRESS NOTES
Chief Complaint   Patient presents with    Follow - Up     R hip arthroplasty, sx 2/13/2025 -  Pt  is healing well. No pain or concerns.        TUYET Cano is a 89 year old female being seen in the office today for follow up of R ASHLEE on 2/13/2025 performed by Dr. Abdi. She has no pain. She has no concerns. She walks with her rollator, which is her baseline.          The following portions of the patient's history were reviewed [unfilled] and updated as appropriate: allergies, current medications, past family history, past medical history, past social history, past surgical history and problem list.      Physical Exam  There were no vitals taken for this visit.  There is no height or weight on file to calculate BMI.  Ortho Exam  Right hip exam demonstrates a well healed surgical scar. She has no swelling, or fluid collections. No tenderness to palpation. She has full ROM of the hip without pain. Negative stinchfield. Walks without a limp. Distaly NV intact.     Imaging:  I independently reviewed imaging dated 5/27/2025. Hybrid ASHLEE is noted in good position without evidence of complicating process.     Assessment and Plan  Assessment & Plan  Orthopedic aftercare    Orders:    XR HIP W OR WO PELVIS 2 OR 3 VIEWS, RIGHT (CPT=73502) [[id number]] - Orthopedic providers only; Future    S/P hip replacement, right           I had a lengthy discussion with the patient today about the patient's hip. I am pleased with her progress.  Treatment options were discussed with the patient at length. Specifically, I recommend the following:    Rest, ice, compression, elevation, anti-inflammatories/Tylenol for symptoms.  Continue home exercise program.   Activities: as tolerated  Follow up: 1 year from surgery with repeat x-rays.    The patient understands and agrees with this plan. All of the patient's questions were answered today.       No follow-ups on file.    [unfilled]    India Guo MD

## 2025-06-17 ENCOUNTER — OFFICE VISIT (OUTPATIENT)
Dept: PODIATRY CLINIC | Facility: CLINIC | Age: OVER 89
End: 2025-06-17

## 2025-06-17 DIAGNOSIS — I73.9 PVD (PERIPHERAL VASCULAR DISEASE): ICD-10-CM

## 2025-06-17 DIAGNOSIS — B35.1 PAIN DUE TO ONYCHOMYCOSIS OF TOENAILS OF BOTH FEET: ICD-10-CM

## 2025-06-17 DIAGNOSIS — M79.675 PAIN DUE TO ONYCHOMYCOSIS OF TOENAILS OF BOTH FEET: ICD-10-CM

## 2025-06-17 DIAGNOSIS — M79.674 PAIN DUE TO ONYCHOMYCOSIS OF TOENAILS OF BOTH FEET: ICD-10-CM

## 2025-06-17 DIAGNOSIS — E11.42 TYPE 2 DIABETES MELLITUS WITH POLYNEUROPATHY (HCC): Primary | ICD-10-CM

## 2025-06-17 PROCEDURE — 99214 OFFICE O/P EST MOD 30 MIN: CPT | Performed by: PODIATRIST

## 2025-06-17 NOTE — PROGRESS NOTES
Reason for Visit      Rossy Cano is a 89 year old female presents today complaining of bilateral diabetic foot evaluation.     History of Present Illness     Patient presents to clinic today after last being seen by podiatry on 8/29/2024 for diabetic foot evaluation.  Patient is a non-insulin-dependent diabetic type II whose last A1c was 8.6.  Patient presents to clinic today complaining of elongated, thickened toenails that she is unable to debride himself.    Patient returns to clinic today for routine diabetic footcare after last being seen by me on 3/13/2025.    The following portions of the patient's history were reviewed and updated as appropriate: allergies, current medications, past family history, past medical history, past social history, past surgical history and problem list.    Allergies[1]      Current Outpatient Medications:     multivitamin Oral Tab, Take 1 tablet by mouth daily., Disp: 60 tablet, Rfl: 0    acetaminophen 325 MG Oral Tab, Take 2 tablets (650 mg total) by mouth every 8 (eight) hours as needed. Maximum 4000 mg Tylenol/acetaminophen daily from all sources.  Each Norco has 325 mg of Tylenol/acetaminophen in it., Disp: 60 tablet, Rfl: 0    aspirin 325 MG Oral Tab EC, Take 1 tablet (325 mg total) by mouth in the morning and 1 tablet (325 mg total) before bedtime. Do not crush., Disp: 60 tablet, Rfl: 0    calcium carbonate-vitamin D 250-3.125 MG-MCG Oral Tab, Take 1 tablet by mouth 2 (two) times daily with meals., Disp: 60 tablet, Rfl: 0    docusate sodium 100 MG Oral Cap, Take 100 mg by mouth 2 (two) times daily. Do not crush. Stop if loose stool, Disp: 20 capsule, Rfl: 0    HYDROcodone-acetaminophen 5-325 MG Oral Tab, Take 1 tablet by mouth every 6 (six) hours as needed. No alcohol or driving on this med. Stop if lethargic or hallucinating.  Maximum 4000 mg Tylenol/acetaminophen daily from all sources.  Each Norco has 325 mg of Tylenol/acetaminophen in it., Disp: 25 tablet, Rfl: 0     naproxen 250 MG Oral Tab, Take 1 tablet (250 mg total) by mouth 2 (two) times daily with meals. Take with food.  Stop if stomach upset., Disp: 28 tablet, Rfl: 0    CRANBERRY OR, Take 1 tablet by mouth daily., Disp: , Rfl:     LYUMJEV KWIKPEN 100 UNIT/ML Subcutaneous Solution Pen-injector, Inject 5 Units into the skin in the morning, at noon, and at bedtime., Disp: , Rfl:     amLODIPine 5 MG Oral Tab, Take 1 tablet (5 mg total) by mouth daily., Disp: , Rfl:     gabapentin 100 MG Oral Cap, Take 1 capsule (100 mg total) by mouth 3 (three) times daily., Disp: , Rfl:     hydroCHLOROthiazide 12.5 MG Oral Tab, Take 1 tablet (12.5 mg total) by mouth 3 (three) times a week. Take 1 on Monday, Wednesday, ans Friday., Disp: , Rfl:     LUTEIN OR, Take 1 tablet by mouth daily.  , Disp: , Rfl:     MetFORMIN HCl  MG Oral Tablet 24 Hr, Take 1 tablet (500 mg total) by mouth 2 (two) times daily with meals., Disp: , Rfl:     LANTUS SOLOSTAR 100 UNIT/ML Subcutaneous Solution Pen-injector, Inject 20 Units into the skin nightly., Disp: , Rfl:     Insulin Syringes, Disposable, U-100 0.5 ML Does not apply Misc, , Disp: , Rfl:     FreeStyle Lancets Does not apply Misc, , Disp: , Rfl:     Glucose Blood In Vitro Strip, , Disp: , Rfl:     Atorvastatin Calcium (LIPITOR) 40 MG Oral Tab, Take 1 tablet (40 mg total) by mouth daily., Disp: , Rfl:     lisinopril (PRINIVIL,ZESTRIL) 40 MG Oral Tab, Take 1 tablet (40 mg total) by mouth daily., Disp: , Rfl:     metoprolol succinate  MG Oral Tablet 24 Hr, Take 1 tablet (100 mg total) by mouth daily., Disp: , Rfl:     Multiple Vitamins-Minerals (MULTI FOR HER 50+) Oral Cap, Take 1 tablet by mouth.  , Disp: , Rfl:     There are no discontinued medications.    Patient Active Problem List   Diagnosis    Recurrent UTI    Microhematuria    Urethral syndrome    Primary osteoarthritis, left shoulder    Diabetes type 2, controlled (HCC)    Hypertension    Dyslipidemia    Community acquired pneumonia of  left lower lobe of lung    Hypoxia    Weakness generalized    Acute cystitis without hematuria    Hyperglycemia    Acute hypoxemic respiratory failure (HCC)    Syncope and collapse    Nausea and vomiting in adult    Aspiration pneumonitis (HCC)    Hypoxemia    Primary osteoarthritis of right hip    S/P hip replacement, right       Past Medical History:    Arthritis    Diabetes (HCC)    High blood pressure    High cholesterol    Hx of motion sickness    Hyperlipidemia    Incontinence    Osteoarthritis    Schatzki's ring    Unspecified essential hypertension    UTI (urinary tract infection)    Visual impairment       Past Surgical History:   Procedure Laterality Date    Anesth,shoulder replacement Left 16    DR LEWIS    Appendectomy      Appendectomy      Colonoscopy      D & c      Dilation/curettage,diagnostic      Egd  3/14       Family History   Problem Relation Age of Onset    Diabetes Father     Stroke Father     Diabetes Mother     Cancer Brother         liver    Cancer Other         family h/o of prostate       Social History     Occupational History    Not on file   Tobacco Use    Smoking status: Former     Current packs/day: 0.00     Types: Cigarettes     Quit date:      Years since quittin.4    Smokeless tobacco: Never   Vaping Use    Vaping status: Never Used   Substance and Sexual Activity    Alcohol use: Not Currently     Comment: occasionally    Drug use: No    Sexual activity: Not on file       ROS     Constitutional: negative for chills, fevers and sweats  Gastrointestinal: negative for abdominal pain, diarrhea, nausea and vomiting  Genitourinary:negative for dysuria and hematuria  Musculoskeletal:negative for arthralgias and muscle weakness  Neurological: negative for paresthesia and weakness  All others reviewed and negative.      Physical Exam     LE PHYSICAL EXAM    Constitution: Well-developed and well-nourished. Gait appears normal. No apparent distress. Alert and oriented to  person, place, and time.  Integument: There are no varicosities. Skin appears moist, warm, and supple with positive hair growth. There are no color changes. No open lesions. No macerations, No Hyperkeratotic lesions.  Vascular examination: Dorsalis pedis and posterior tibial pulses are strong bilaterally with capillary filling time less than 3 seconds to all digits. There is no peripheral edema..  Neurological Sensorium: Grossly intact to sharp/dull. Vibratory: Intact.  Musculoskeletal:   5/5 pedal muscle strength b/l     Advanced trophic changes as: hair growth decrease  nail changes  (thickening) pigmentary changes (discoloration) skin texture (thin, shiny)   Procedure       Nails 1 through 5 bilateral debrided with use of a nail nipper.  Patient Toller procedure well had no complications.  Neurovascular status intact to the level of the digits post procedure.     Assessment and Plan     No diagnosis found.    Diabetic education and instructions have been provided. We reviewed and discussed the following:    -risk categories related to pts with diabetes and foot or lower extremity complications per ADA.    -adherence to medication regimen and close monitoring or blood sugar control.   -daily monitoring/inspection of feet and shoes.   -proper management of diet and weight   -regular follow up with PCP and specialty providers as recommended   -Lower extremity complications related to DM were reviewed and stressed prevention.           Nails 1 through 5 bilateral debrided with use of a nail nipper.  Patient Toller procedure well had no complications.  Neurovascular status intact to the level of the digits post procedure.     Patient was instructed to call the office or on-call podiatric physician immediately with any issues or concerns before the next scheduled visit. Patient to follow-up in clinic in 3 months      Teodora Olivas DPM, D.JOSSELYN, BRINDA  Diplomat, American Board of Foot and Ankle Surgery  Certified  in Foot and Rearfoot/Ankle Reconstruction  Fellow of the American College of Foot and Ankle Surgeons  Fellowship Trained Foot and Ankle Surgeon   [facility]     12/5/2024    2:53 PM         [1]   Allergies  Allergen Reactions    Bactrim [Sulfamethoxazole W/Trimethoprim] RASH     rash

## (undated) DIAGNOSIS — N39.0 RECURRENT UTI: Primary | ICD-10-CM

## (undated) DEVICE — SUT VCRL 1-0 36IN CTX ABSRB UD L48MM 1/2 CIR

## (undated) DEVICE — APPLICATOR PREP 26ML CHG 2% ISO ALC 70%

## (undated) DEVICE — 35 ML SYRINGE LUER-LOCK TIP: Brand: MONOJECT

## (undated) DEVICE — SUT MCRYL 3-0 27IN ABSRB UD L24MM PS-1

## (undated) DEVICE — SHEET,DRAPE,53X77,STERILE: Brand: MEDLINE

## (undated) DEVICE — SKIN PREP TRAY 4 COMPARTM TRAY: Brand: MEDLINE INDUSTRIES, INC.

## (undated) DEVICE — INSULATED BLADE ELECTRODE: Brand: EDGE

## (undated) DEVICE — ANTIBACTERIAL UNDYED BRAIDED (POLYGLACTIN 910), SYNTHETIC ABSORBABLE SUTURE: Brand: COATED VICRYL

## (undated) DEVICE — Device: Brand: STABLECUT®

## (undated) DEVICE — DRAPE,U/ SHT,SPLIT,PLAS,STERIL: Brand: MEDLINE

## (undated) DEVICE — ZZ-CONVERTED-TO-522442- SPONGE 4X4 10PK

## (undated) DEVICE — ENDOSCOPY PACK UPPER: Brand: MEDLINE INDUSTRIES, INC.

## (undated) DEVICE — KIT NEG PRSS PREVENA DRAIN 20CM INCIS MGMT PEEL PALACE

## (undated) DEVICE — GAMMEX® NON-LATEX PI ORTHO SIZE 8, STERILE POLYISOPRENE POWDER-FREE SURGICAL GLOVE: Brand: GAMMEX

## (undated) DEVICE — GAMMEX® PI HYBRID SIZE 7.5, STERILE POWDER-FREE SURGICAL GLOVE, POLYISOPRENE AND NEOPRENE BLEND: Brand: GAMMEX

## (undated) DEVICE — PACK CDS ANTERIOR HIP

## (undated) DEVICE — BIPOLAR SEALER 23-112-1 AQM 6.0: Brand: AQUAMANTYS™

## (undated) DEVICE — SUCTION CANISTER, 3000CC,SAFELINER: Brand: DEROYAL

## (undated) DEVICE — GAMMEX® PI HYBRID SIZE 6.5, STERILE POWDER-FREE SURGICAL GLOVE, POLYISOPRENE AND NEOPRENE BLEND: Brand: GAMMEX

## (undated) DEVICE — HANDPIECE SET WITH HIGH FLOW TIP AND SUCTION TUBE: Brand: INTERPULSE

## (undated) DEVICE — Device: Brand: DEFENDO AIR/WATER/SUCTION AND BIOPSY VALVE

## (undated) DEVICE — SOLUTION IV 1000ML 0.9% NACL PRESERVATIVE

## (undated) DEVICE — HOOD: Brand: FLYTE

## (undated) DEVICE — FORCEP RADIAL JAW 4

## (undated) DEVICE — SOLUTION IRRIG 1000ML 0.9% NACL USP BTL

## (undated) DEVICE — NEEDLE SPNL 18GA L3.5IN W/ QNCKE SHARPER BVL

## (undated) DEVICE — GAMMEX® NON-LATEX PI ORTHO SIZE 7, STERILE POLYISOPRENE POWDER-FREE SURGICAL GLOVE: Brand: GAMMEX

## (undated) NOTE — LETTER
Hospital Discharge Documentation  Please phone to schedule a hospital follow up appointment. No discharge summary available at this time, below is the most recent progress note  for your review .       From: [address] [facility]  Phone: [phone] glucose (DEX4) oral liquid 30 g, 30 g, Oral, Q15 Min PRN    Or  Glucose-Vitamin C (DEX-4) chewable tab 8 tablet, 8 tablet, Oral, Q15 Min PRN  Heparin Sodium (Porcine) 5000 UNIT/ML injection 5,000 Units, 5,000 Units, Subcutaneous, 2 times per day  Insulin A CONCLUSION: Hazy opacity left mid lung which may reflect pneumonia. Small left pleural effusion with mild left linear basilar atelectasis or scarring. A preliminary report was issued by the [address] [place] [address] Radiology teleradiology service.  There are no major discr 3.       Hypertension.  Blood pressure is elevated.  Continue her beta blocker.  Hold the lisinopril until we have more opportunity to monitor her blood pressure with her pneumonia.      4.       Hemoglobin low-normal has dropped about 1 g from previous le

## (undated) NOTE — MR AVS SNAPSHOT
[address]  [address]  [phone]               Thank you for choosing us for your health care visit with Rachna Zelaya DPM.  We are glad to serve you and happy to provide yo Commonly known as:  LIPITOR           Fish Oil 1200 MG Caps   Take  by mouth.            FREESTYLE LANCETS Misc           FREESTYLE LITE TEST Strp   Generic drug:  Glucose Blood           HYDROcodone-acetaminophen  MG Tabs   Take 1 tablet by mouth venkat For medical emergencies, dial 911.            Visit [facility] online at  [email or web address]

## (undated) NOTE — LETTER
No referring provider defined for this encounter. 05/21/19        Patient: Annemarie Ivory   YOB: 1933   Date of Visit: 5/21/2019       Dear  Dr. Nicky Urban MD,      Thank you for referring Annemarie Ivory to my practice.   Ple 12/4/2015 urine culture = klebsiella pneumoniae 50,000 - 100,000 cfu/mL   9/24/2015 urine culture = E. coli > 100,000 cfu/mL     IMAGING   8/2/2016 renal ultrasound = kidneys normal, small left parapelvic cyst, no hydronephrosis          ASSESSMENT/PLAN: treatment options and patient understood and chooses to continue observation.    (N39.41) Urge incontinence  See Stress Incontinence above.    (R35.1) Nocturia  Chronic problem that occurs one time per night.  Patient feels this problem is stable and choose

## (undated) NOTE — LETTER
Hospital Discharge Documentation  Please phone to schedule a hospital follow up appointment. From: [address] [facility]  Phone: [phone]    Patient discharged time/date: 2023 12:22 PM  Patient discharge disposition:  Home or Self Care       Discharge Summary - D/C Summary      Discharge Summary signed by Taryn Saldana MD at 2023  4:33 PM  Version 1 of 1    Author: Taryn Saldana MD Service: Internal Medicine Author Type: Physician    Filed: 2023  4:33 PM Date of Service: 2023  4:29 PM Status: Signed    : Taryn Saldana MD (Physician)           [facility]    Discharge Summary    Ramiro Man Patient Status:  Observation    1933 MRN [id number]   Location [facility] Attending No att. providers found   Hosp Day # 0 PCP Tomasz Cox MD     Date of Admission: 2023      Date of Discharge: 2023 12:22 PM      Admitting Diagnosis: Weakness generalized [R53.1]  Hyperglycemia [R73.9]  Acute cystitis without hematuria [N30.00]    Hospital Discharge Diagnoses:   Weakness, fall secondary to UTI  E. coli cystitis      Lace+ Score: 52  59-90 High Risk  29-58 Medium Risk  0-28   Low Risk. TCM Follow-Up Recommendation:  LACE 29-58:  Moderate Risk of readmission after discharge from the hospital.          Problem List: Patient Active Problem List:     Recurrent UTI     Microhematuria     Urethral syndrome     Primary osteoarthritis, left shoulder     Diabetes type 2, controlled ([address]     Hypertension     Dyslipidemia     Community acquired pneumonia of left lower lobe of lung     Hypoxia     Weakness generalized     Acute cystitis without hematuria     Hyperglycemia        Physical Exam:   Vitals History 2023   /55 145/55   BP Location - Right arm   Patient Position - -   Cuff Size - -   Pulse - 78   Resp - 18   Temp - 97.7   SpO2 - 91   Weight - -   Height - -   BODY MASS INDEX - -      General:  Alert and oriented. Diffuse skin problem:  None. Eye:  Pupils are equal, round and reactive to light, extraocular movements are intact, Normal conjunctiva. HENT:  Normocephalic, oral mucosa is moist.  Head:  Normocephalic, atraumatic. Neck:  Supple, non-tender, no carotid bruit, no jugular venous distention, no lymphadenopathy, no thyromegaly. Respiratory:  Lungs are clear to auscultation, respirations are non-labored, breath sounds are equal, symmetrical chest wall expansion. Cardiovascular:  Normal rate, regular rhythm, no murmur, no edema. Gastrointestinal:  Soft, non-tender, non-distended, normal bowel sounds, no organomegaly. Lymphatics:  No lymphadenopathy neck, axilla, groin. Musculoskeletal: Normal range of motion. normal strength. Feet:  Normal pulses. Neurologic:  Alert, oriented, no focal deficits, cranial nerves II-XII are grossly intact. Cognition and Speech:  Oriented, speech clear and coherent. Psychiatric:  Cooperative, appropriate mood & affect. History of Present Illness: (From chart)  Ramiro Ramos is a(n) 80year old female, with a past medical history significant for hypertension diabetes and hyperlipidemia presents with a complaint of increasing weakness fall where she was unable to get up from the floor. Patient was recently diagnosed with a UTI was started on oral antibiotics, however claims she has been getting progressively weaker, was attempting to go to the bathroom with the assistance of a walker however her legs gave out and she fell to the ground, unable to pull herself up. She used her life alert switch to notify EMS and was brought into the ER for further evaluation. She denies any fever chills denies any head injury loss of consciousness headache dizziness blurry vision focal numbness weakness or tingling. She denies any chest pain palpitations or shortness of breath. No leukocytosis or fever. Hospital Course: Patient was admitted and started on antibiotics.   Her weakness significantly improved overnight. Urine cultures grew E. coli from 1/3. Resistant to nitrofurantoin. Patient sensitive to cephalosporins and she will be discharged back on oral regimen today. Denies any complaints. Roula Lewis to go home. Daughter at bedside. Discussed at length regarding her urinary history and outpatient recommendations. Patient given follow-up instructions and medications reviewed. Remained afebrile while here. Was eval by PT OT and they recommend home with outpatient PT OT. Discharge Condition: Good    Discharge Medications:      Discharge Medications      CHANGE how you take these medications      Instructions Prescription details   cephalexin 500 MG Caps  Commonly known as: Keflex  What changed: Another medication with the same name was removed. Continue taking this medication, and follow the directions you see here. Take 1 capsule (500 mg total) by mouth 2 (two) times daily for 7 days. Stop taking on: January 10, 2023  Quantity: 14 capsule  Refills: 0        CONTINUE taking these medications      Instructions Prescription details   aspirin 81 MG Chew      Chew 81 mg by mouth. Refills: 0     atorvastatin 40 MG Tabs  Commonly known as: Lipitor      Take 40 mg by mouth daily. Refills: 0     FreeStyle Lancets Misc       Refills: 0     Glucose Blood Strp       Refills: 0     insulin lispro 100 UNIT/ML Soln  Commonly known as: Humalog      3 times a day with meals-using up to 6 units three times a day   Refills: 0     Insulin Syringes (Disposable) U-100 0.5 ML Misc       Refills: 0     Lantus SoloStar 100 UNIT/ML Sopn  Generic drug: insulin glargine      Inject 20 Units into the skin nightly. Refills: 0     lisinopril 40 MG Tabs  Commonly known as: Prinivil; Zestril      Take 40 mg by mouth daily. Refills: 0     LUTEIN OR      Take 1 tablet by mouth daily.    Refills: 0     metFORMIN  MG Tb24  Commonly known as: Glucophage XR      Take 500 mg by mouth 3 (three) times daily. Refills: 0     metoprolol succinate  MG Tb24  Commonly known as: Toprol XL      Take 100 mg by mouth daily. Refills: 0     Multi For Her 50+ Caps      Take 1 tablet by mouth. Refills: 0        STOP taking these medications    Fish Oil 1200 MG Caps        nitrofurantoin macrocrystal 50 MG Caps  Commonly known as: Macrodantin           ASK your doctor about these medications      Instructions Prescription details   fluticasone propionate 50 MCG/ACT Susp  Commonly known as: Flonase      1 spray by Nasal route 2 (two) times daily. Quantity: 1 Bottle  Refills: 3            Outpatient follow-up with urology and primary care doctor within 1 week.     Minerva Gregory MD  1/5/2023  4:29 PM    Greater than 30 minutes spent on preparation and coordination of this discharge    Electronically signed by Guillermina Freed MD on 1/5/2023  4:33 PM

## (undated) NOTE — LETTER
July 5, 2017         Helon Canavan, MD  [address]      Patient: Dylan Stanton   YOB: 1933   Date of Visit: 6/28/2017       Dear  Dr. Helon Canavan, MD,      Thank you for referring Dylan Stanton to [facility] help prevent recurrent urinary tract infection     2.   If you develop symptoms of urinary tract infection, immediately submit urine specimen, standing order for urine culture and complete urinalysis--subsequently call my office nurses and warn them I do th 12/4/2015 urine culture = klebsiella pneumoniae 50,000 - 100,000 cfu/mL   9/24/2015 urine culture = E. coli > 100,000 cfu/mL     IMAGING   8/2/2016 renal ultrasound = kidneys normal, small left parapelvic cyst, no hydronephrosis             Sincerely,

## (undated) NOTE — LETTER
No referring provider defined for this encounter. 04/03/18        Patient: Brandyn German   YOB: 1933   Date of Visit: 4/3/2018       Dear  Dr. Jarret Rojas MD,      I evaluated   Brandyn German in the office today.   Please fi 12/4/2015 urine culture = klebsiella pneumoniae 50,000 - 100,000 cfu/mL   9/24/2015 urine culture = E. coli > 100,000 cfu/mL     IMAGING   8/2/2016 renal ultrasound = kidneys normal, small left parapelvic cyst, no hydronephrosis          ASSESSMENT/PLAN: (N39.3) Stress incontinence  Chronic problem. Pt wears one pad daily to stay dry.  Patient feels this problem is stable; I discussed, explained, and answered questions on treatment options and patient understood and chooses to continue observation.    (R35.

## (undated) NOTE — LETTER
No referring provider defined for this encounter. 07/02/17        Patient: Ene Brewer   YOB: 1933   Date of Visit: 6/28/2017       Dear  Dr. Padmini Harper MD,      Thank you for referring Ene Brewer to my practice.   Ple help prevent recurrent urinary tract infection     2.   If you develop symptoms of urinary tract infection, immediately submit urine specimen, standing order for urine culture and complete urinalysis--subsequently call my office nurses and warn them I do th and gram negative bacilli < 10,000 cfu/mL )   12/4/2015 urine culture = klebsiella pneumoniae 50,000 - 100,000 cfu/mL   9/24/2015 urine culture = E. coli > 100,000 cfu/mL     IMAGING   8/2/2016 renal ultrasound = kidneys normal, small left parapelvic cyst, 2.  If you develop symptoms of urinary tract infection, immediately submit urine specimen, standing order for urine culture and complete urinalysis--subsequently call my office nurses and warn them I do think you have a urinary tract infection.     3.   In

## (undated) NOTE — MR AVS SNAPSHOT
[address]  [phone]               Thank you for choosing us for your health care visit with Jeb Romero.  Jessica Jean MD.  We are glad to serve you and happy to provide you with this summar Commonly known as:  HUMALOG           Insulin Syringes (Disposable) U-100 0.5 ML Misc           LANTUS SOLOSTAR 100 UNIT/ML Sopn   Generic drug:  Insulin Glargine           lisinopril 40 MG Tabs   Commonly known as:  PRINIVIL,ZESTRIL           MetFORMIN HC including white bread, rice and pasta   Eat plenty of protein, keep the fat content low Sugars:  sodas and sports drinks, candies and desserts   Eat plenty of low-fat dairy products High fat meats and dairy   Choose whole grain products Foods high in sodiu

## (undated) NOTE — LETTER
9/19/2018          Rossy Redding  [address]    Dear Sai Wadsworth,       Here are the biopsy/pathology findings from your recent EGD (Upper  Endoscopy): esophagus biopsies confirm acid reflux related irritation but otherwise negat